# Patient Record
Sex: FEMALE | Race: BLACK OR AFRICAN AMERICAN | Employment: OTHER | URBAN - METROPOLITAN AREA
[De-identification: names, ages, dates, MRNs, and addresses within clinical notes are randomized per-mention and may not be internally consistent; named-entity substitution may affect disease eponyms.]

---

## 2017-03-23 ENCOUNTER — OFFICE VISIT (OUTPATIENT)
Dept: CARDIOLOGY CLINIC | Age: 82
End: 2017-03-23

## 2017-03-23 ENCOUNTER — CLINICAL SUPPORT (OUTPATIENT)
Dept: CARDIOLOGY CLINIC | Age: 82
End: 2017-03-23

## 2017-03-23 VITALS
SYSTOLIC BLOOD PRESSURE: 144 MMHG | HEART RATE: 74 BPM | DIASTOLIC BLOOD PRESSURE: 90 MMHG | OXYGEN SATURATION: 92 % | WEIGHT: 168.5 LBS | BODY MASS INDEX: 37.9 KG/M2 | HEIGHT: 56 IN | RESPIRATION RATE: 18 BRPM

## 2017-03-23 DIAGNOSIS — Z95.0 PACEMAKER: Primary | ICD-10-CM

## 2017-03-23 DIAGNOSIS — I25.10 CORONARY ARTERY DISEASE INVOLVING NATIVE CORONARY ARTERY OF NATIVE HEART WITHOUT ANGINA PECTORIS: ICD-10-CM

## 2017-03-23 DIAGNOSIS — I44.1 MOBITZ TYPE II ATRIOVENTRICULAR BLOCK: ICD-10-CM

## 2017-03-23 DIAGNOSIS — I49.5 SICK SINUS SYNDROME (HCC): ICD-10-CM

## 2017-03-23 DIAGNOSIS — I10 ESSENTIAL HYPERTENSION: ICD-10-CM

## 2017-03-23 DIAGNOSIS — I50.32 CHRONIC DIASTOLIC HEART FAILURE (HCC): Primary | ICD-10-CM

## 2017-03-23 DIAGNOSIS — R55 SYNCOPE AND COLLAPSE: ICD-10-CM

## 2017-03-23 DIAGNOSIS — Z95.0 PACEMAKER: ICD-10-CM

## 2017-03-23 RX ORDER — DOXAZOSIN 2 MG/1
2 TABLET ORAL DAILY
Refills: 1 | COMMUNITY
Start: 2017-03-13

## 2017-03-23 NOTE — MR AVS SNAPSHOT
Visit Information Date & Time Provider Department Dept. Phone Encounter #  
 3/23/2017 10:00 AM Fracisco Lockwood, [address] [facility] [phone] Upcoming Health Maintenance Date Due DTaP/Tdap/Td series (1 - Tdap) 5/17/1948 ZOSTER VACCINE AGE 60> 5/17/1987 GLAUCOMA SCREENING Q2Y 5/17/1992 OSTEOPOROSIS SCREENING (DEXA) 5/17/1992 MEDICARE YEARLY EXAM 5/17/1992 Pneumococcal 65+ High/Highest Risk (2 of 2 - PCV13) 4/7/2014 INFLUENZA AGE 9 TO ADULT 8/1/2016 Allergies as of 3/23/2017  Review Complete On: 3/23/2017 By: Fracisco Lockwood MD  
  
 Severity Noted Reaction Type Reactions Pcn [Penicillins]  04/06/2013    Unknown (comments) Current Immunizations  Reviewed on 3/6/2015 Name Date Influenza Vaccine 10/1/2014 Pneumococcal Polysaccharide (PPSV-23) 4/7/2013  6:38 PM  
  
 Not reviewed this visit You Were Diagnosed With   
  
 Codes Comments Chronic diastolic heart failure (HCC)    -  Primary ICD-10-CM: I50.32 
ICD-9-CM: 428.32 Coronary artery disease involving native coronary artery of native heart without angina pectoris     ICD-10-CM: I25.10 ICD-9-CM: 414.01 Sick sinus syndrome (HCC)     ICD-10-CM: I49.5 ICD-9-CM: 427.81 Mobitz type II atrioventricular block     ICD-10-CM: I44.1 ICD-9-CM: 426.12 Essential hypertension     ICD-10-CM: I10 
ICD-9-CM: 401.9 Pacemaker     ICD-10-CM: Z95.0 ICD-9-CM: V45.01 Vitals BP Pulse Resp Height(growth percentile) Weight(growth percentile) SpO2  
 144/90 (BP 1 Location: Right arm, BP Patient Position: Sitting) 74 18 4' 8\" (1.422 m) 168 lb 8 oz (76.4 kg) 92% BMI OB Status Smoking Status 37.78 kg/m2 Menopause Never Smoker Vitals History BMI and BSA Data Body Mass Index Body Surface Area 37.78 kg/m 2 1.74 m 2 Preferred Pharmacy Pharmacy Name Phone [address] [facility] Your Updated Medication List  
  
   
This list is accurate as of: 3/23/17 11:29 AM.  Always use your most recent med list.  
  
  
  
  
 allopurinol 100 mg tablet Commonly known as:  Alyssa Andrew Take  by mouth daily. ARTIFICIAL TEARS([id number]) ophthalmic solution Generic drug:  dextran 70-hypromellose Administer 1 Drop to both eyes as needed. aspirin delayed-release 81 mg tablet Take 81 mg by mouth daily. bumetanide 1 mg tablet Commonly known as:  Assunta Brink Take 1 Tab by mouth daily. doxazosin 2 mg tablet Commonly known as:  CARDURA Take 2 mg by mouth daily. ferrous sulfate 325 mg (65 mg iron) tablet Take 1 Tab by mouth two (2) times daily (with meals). FLONASE 50 mcg/actuation nasal spray Generic drug:  fluticasone 2 Sprays by Both Nostrils route daily. gabapentin 100 mg capsule Commonly known as:  NEURONTIN Take 2 Caps by mouth three (3) times daily. isosorbide mononitrate ER 30 mg tablet Commonly known as:  IMDUR  
take 1 tablet by mouth once daily TO HELP PREVENT CHEST PAIN  
  
 latanoprost 0.005 % ophthalmic solution Commonly known as:  Dequan Gregory Administer 1 Drop to both eyes nightly. lidocaine 5 % topical cream  
Apply  to affected area two (2) times daily as needed for Itching.  
  
 magnesium oxide 400 mg tablet Commonly known as:  MAG-OX Take 1 Tab by mouth daily. metoprolol tartrate 25 mg tablet Commonly known as:  LOPRESSOR Take 0.5 Tabs by mouth two (2) times a day. nitroglycerin 0.4 mg SL tablet Commonly known as:  NITROSTAT  
1 Tab by SubLINGual route every five (5) minutes as needed (call 911 if not relieved by 3). pantoprazole 40 mg tablet Commonly known as:  PROTONIX Take 1 Tab by mouth Daily (before breakfast). SANNA MILK OF MAGNESIA 400 mg/5 mL suspension Generic drug:  magnesium hydroxide Take 30 mL by mouth daily as needed for Constipation. pravastatin 20 mg tablet Commonly known as:  PRAVACHOL Take 1 Tab by mouth nightly. PROAIR HFA 90 mcg/actuation inhaler Generic drug:  albuterol Take 1 puff by inhalation every six (6) hours as needed for Wheezing. ticagrelor 60 mg Tab tablet Commonly known as:  [employer] Take 1 Tab by mouth two (2) times a day. traMADol 50 mg tablet Commonly known as:  ULTRAM  
Take 1 Tab by mouth every six (6) hours as needed for Pain. Max Daily Amount: 200 mg.  
  
 TYLENOL 325 mg tablet Generic drug:  acetaminophen Take 1,300 mg by mouth nightly. Prescriptions Sent to Pharmacy Refills  
 ticagrelor (BRILINTA) 60 mg tab tablet 11 Sig: Take 1 Tab by mouth two (2) times a day. Class: Normal  
 Pharmacy:  [facility]  #: [phone] Route: Oral  
  
We Performed the Following AMB POC EKG ROUTINE W/ 12 LEADS, INTER & REP [91371 CPT(R)] CK [id number] CPT(R)] LIPID PANEL [54790 CPT(R)] METABOLIC PANEL, COMPREHENSIVE [27562 CPT(R)] Please provide this summary of care documentation to your next provider. Your primary care clinician is listed as Walter Casey. If you have any questions after today's visit, please call [phone].

## 2017-03-23 NOTE — PROGRESS NOTES
6 month follow up. C/O SOB, SWELLING IN BLE, HANDS AND ARMS. PT ALSO REPORTED A MORPHINE CREAM FOR FEET.

## 2017-03-23 NOTE — PROGRESS NOTES
Subjective/HPI:     Damion Marshall is a 80 y.o. female is here for f/u appt. She reports that she has her baseline swelling in her legs and arms. She has arthritis and is using a cream for the pain. She has had worsening sob with exertion over the past year. Her daughter has noticed a progressive decline in her breathing as well. She isnt active at all now either. She sits in her chair all day. The patient denies chest pain, orthopnea, PND, palpitations, syncope, presyncope or fatigue.        Patient Active Problem List   Diagnosis Code    Other nonspecific abnormal serum enzyme levels R74.8    Sick sinus syndrome ([facility]) I49.5    Dehydration E86.0    ACS (acute coronary syndrome) ([facility]) I24.9    Hypertension I10    NSTEMI (non-ST elevated myocardial infarction) ([facility]) I21.4    Chest pain R07.9    CAD (coronary artery disease), native coronary artery I25.10    Syncope and collapse R55    Mobitz type II atrioventricular block I44.1    Pacemaker Z95.0    S/P PTCA (percutaneous transluminal coronary angioplasty) Z98.61    Coronary artery disease involving native coronary artery without angina pectoris I25.10    Acute blood loss anemia D62    GI bleed K92.2    Elevated troponin R79.89    CKD (chronic kidney disease) stage 3, GFR 30-59 ml/min N18.3    Chronic diastolic heart failure ([facility]) I50.32     PCP Provider  Zahida Patel NP  Past Medical History:   Diagnosis Date    Asthma     CAD (coronary artery disease)     Diabetes [facility] [address]     Hypertension     Irregular heart rate 4/25/13    Morbid obesity ([facility] [address]     Other ill-defined conditions(799.89)     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 Current Outpatient Prescriptions   Medication Sig    doxazosin (CARDURA) 2 mg tablet Take 2 mg by mouth daily.  ticagrelor (BRILINTA) 60 mg tab tablet Take 1 Tab by mouth two (2) times a day.  isosorbide mononitrate ER (IMDUR) 30 mg tablet take 1 tablet by mouth once daily TO HELP PREVENT CHEST PAIN    magnesium hydroxide (ISIDRO MILK OF MAGNESIA) 400 mg/5 mL suspension Take 30 mL by mouth daily as needed for Constipation.  gabapentin (NEURONTIN) 100 mg capsule Take 2 Caps by mouth three (3) times daily.  metoprolol tartrate (LOPRESSOR) 25 mg tablet Take 0.5 Tabs by mouth two (2) times a day.  bumetanide (BUMEX) 1 mg tablet Take 1 Tab by mouth daily.  magnesium oxide (MAG-OX) 400 mg tablet Take 1 Tab by mouth daily.  pantoprazole (PROTONIX) 40 mg tablet Take 1 Tab by mouth Daily (before breakfast).  traMADol (ULTRAM) 50 mg tablet Take 1 Tab by mouth every six (6) hours as needed for Pain. Max Daily Amount: 200 mg.  ferrous sulfate 325 mg (65 mg iron) tablet Take 1 Tab by mouth two (2) times daily (with meals).  acetaminophen (TYLENOL) 325 mg tablet Take 1,300 mg by mouth nightly.  dextran 70-hypromellose (ARTIFICIAL TEARS) ophthalmic solution Administer 1 Drop to both eyes as needed.  lidocaine 5 % topical cream Apply  to affected area two (2) times daily as needed for Itching.  fluticasone (FLONASE) 50 mcg/actuation nasal spray 2 Sprays by Both Nostrils route daily.  nitroglycerin (NITROSTAT) 0.4 mg SL tablet 1 Tab by SubLINGual route every five (5) minutes as needed (call 911 if not relieved by 3).  allopurinol (ZYLOPRIM) 100 mg tablet Take  by mouth daily.  aspirin delayed-release 81 mg tablet Take 81 mg by mouth daily.     albuterol (PROAIR HFA) 90 mcg/actuation inhaler Take 1 puff by inhalation every six (6) hours as needed for Wheezing.  pravastatin (PRAVACHOL) 20 mg tablet Take 1 Tab by mouth nightly.  latanoprost (XALATAN) 0.005 % ophthalmic solution Administer 1 Drop to both eyes nightly. No current facility-administered medications for this visit. Vitals:    03/23/17 1027 03/23/17 1042   BP: 138/88 144/90   Pulse: 74    Resp: 18    SpO2: 92%    Weight: 168 lb 8 oz (76.4 kg)    Height: 4' 8\" (1.422 m)      Social History     Social History    Marital status:      Spouse name: N/A    Number of children: N/A    Years of education: N/A     Occupational History    Not on file. Social History Main Topics    Smoking status: Never Smoker    Smokeless tobacco: Never Used    Alcohol use No    Drug use: No    Sexual activity: Yes     Partners: Male     Other Topics Concern    Not on file     Social History Narrative       I have reviewed the nurses notes, vitals, problem list, allergy list, medical history, family, social history and medications. Review of Symptoms:    General: Pt denies excessive weight gain or loss. Pt is able to conduct ADL's  HEENT: Denies blurred vision, headaches, epistaxis and difficulty swallowing. Respiratory: Denies shortness of breath, +DOTSON, denies wheezing or stridor. Cardiovascular: Denies precordial pain, palpitations, edema or PND  Gastrointestinal: Denies poor appetite, indigestion, abdominal pain or blood in stool  Urinary: Denies dysuria, pyuria  Musculoskeletal: Denies pain or swelling from muscles or joints  Neurologic: Denies tremor, paresthesias, or sensory motor disturbance  Skin: Denies rash, itching or texture change. Psych: Denies depression        Physical Exam:      General: Well developed, in no acute distress, cooperative and alert  HEENT: No carotid bruits, no JVD, trach is midline. Neck Supple, PEERL, EOM intact. Heart:  Normal S1/S2 negative S3 or S4.  Regular, no murmur, gallop or rub.   Respiratory: Clear bilaterally x 4, no wheezing or rales  Abdomen:   Soft, non-tender, no masses, bowel sounds are active.   Extremities:  1+jacqueline pedal edema, right leg with brace mid calf to foot, normal cap refill, no cyanosis, atraumatic. Neuro: A&Ox3, speech clear,walker. Skin: Skin color is normal. No rashes or lesions. Non diaphoretic  Vascular: 2+ pulses symmetric in all extremities    Cardiographics    ECG: Sinus  Rhythm  -First degree A-V block HR 63  Inferior infarct -age undetermined. Negative T-waves     Results for orders placed or performed during the hospital encounter of 07/22/16   EKG, 12 LEAD, INITIAL   Result Value Ref Range    Ventricular Rate 60 BPM    Atrial Rate 59 BPM    P-R Interval 256 ms    QRS Duration 158 ms    Q-T Interval 466 ms    QTC Calculation (Bezet) 466 ms    Calculated P Axis 14 degrees    Calculated R Axis -55 degrees    Calculated T Axis 96 degrees    Diagnosis       AV dual-paced rhythm with prolonged AV conduction    When compared with ECG of 29-FEB-2016 16:31,  Electronic ventricular pacemaker has replaced Sinus rhythm  Confirmed by Galindo Priest ([id number]) on 7/22/2016 3:35:36 PM           Cardiology Labs:  Lab Results   Component Value Date/Time    Cholesterol, total 128 03/28/2017 10:46 AM    HDL Cholesterol 43 03/28/2017 10:46 AM    LDL, calculated 70 03/28/2017 10:46 AM    Triglyceride 76 03/28/2017 10:46 AM    CHOL/HDL Ratio 2.5 01/05/2015 02:25 AM       Lab Results   Component Value Date/Time    Sodium 141 03/28/2017 10:46 AM    Potassium 4.1 03/28/2017 10:46 AM    Chloride 103 03/28/2017 10:46 AM    CO2 25 03/28/2017 10:46 AM    Anion gap 8 08/18/2016 03:38 AM    Glucose 120 03/28/2017 10:46 AM    BUN 21 03/28/2017 10:46 AM    Creatinine 0.95 03/28/2017 10:46 AM    BUN/Creatinine ratio 22 03/28/2017 10:46 AM    GFR est AA 61 03/28/2017 10:46 AM    GFR est non-AA 53 03/28/2017 10:46 AM    Calcium 10.7 03/28/2017 10:46 AM    AST (SGOT) 20 03/28/2017 10:46 AM    Alk.  phosphatase 98 03/28/2017 10:46 AM    Protein, total 6.6 03/28/2017 10:46 AM    Albumin 4.1 03/28/2017 10:46 AM    Globulin 2.7 08/18/2016 03:38 AM    A-G Ratio 1.6 03/28/2017 10:46 AM    ALT (SGPT) 16 03/28/2017 10:46 AM           Assessment:     Assessment:     Sohail Ireland was seen today for coronary artery disease. Diagnoses and all orders for this visit:    Chronic diastolic heart failure (HCC)  -     AMB POC EKG ROUTINE W/ 12 LEADS, INTER & REP  -     CK  -     LIPID PANEL  -     METABOLIC PANEL, COMPREHENSIVE    Coronary artery disease involving native coronary artery of native heart without angina pectoris  -     ticagrelor (BRILINTA) 60 mg tab tablet; Take 1 Tab by mouth two (2) times a day. -     CK  -     LIPID PANEL  -     METABOLIC PANEL, COMPREHENSIVE    Sick sinus syndrome (HCC)  -     CK  -     LIPID PANEL  -     METABOLIC PANEL, COMPREHENSIVE    Mobitz type II atrioventricular block  -     CK  -     LIPID PANEL  -     METABOLIC PANEL, COMPREHENSIVE    Essential hypertension  -     CK  -     LIPID PANEL  -     METABOLIC PANEL, COMPREHENSIVE    Pacemaker  -     CK  -     LIPID PANEL  -     METABOLIC PANEL, COMPREHENSIVE    Other orders  -     CVD REPORT  -     CKD REPORT        ICD-10-CM ICD-9-CM    1. Chronic diastolic heart failure (HCC) I50.32 428.32 AMB POC EKG ROUTINE W/ 12 LEADS, INTER & REP      CK      LIPID PANEL      METABOLIC PANEL, COMPREHENSIVE   2. Coronary artery disease involving native coronary artery of native heart without angina pectoris I25.10 414.01 ticagrelor (BRILINTA) 60 mg tab tablet      CK      LIPID PANEL      METABOLIC PANEL, COMPREHENSIVE   3. Sick sinus syndrome (HCC) I49.5 427.81 CK      LIPID PANEL      METABOLIC PANEL, COMPREHENSIVE   4. Mobitz type II atrioventricular block I44.1 426.12 CK      LIPID PANEL      METABOLIC PANEL, COMPREHENSIVE   5. Essential hypertension I10 401.9 CK      LIPID PANEL      METABOLIC PANEL, COMPREHENSIVE   6.  Pacemaker Z95.0 V45.01 CK      LIPID PANEL      METABOLIC PANEL, COMPREHENSIVE Orders Placed This Encounter    CK    LIPID PANEL    METABOLIC PANEL, COMPREHENSIVE    CVD REPORT    CKD REPORT    AMB POC EKG ROUTINE W/ 12 LEADS, INTER & REP     Order Specific Question:   Reason for Exam:     Answer:   routine    doxazosin (CARDURA) 2 mg tablet     Sig: Take 2 mg by mouth daily. Refill:  1    ticagrelor (BRILINTA) 60 mg tab tablet     Sig: Take 1 Tab by mouth two (2) times a day. Dispense:  60 Tab     Refill:  11        Plan:     Patient presents today for f/u and is reporting DOTSON over the past year that is worsening. She denies other cardiac complaints. She is also inactive  . 1. CAD: Pt with worsening DOTSON, additionally less active, improved with inhaler. At this time encouraged more activity without stent 3/16 and NL echo in 2015. She is one year post last stent reduce Brilinta to 60mg bid with lifelong DAPT with h/o bifurcation stenting and NSTEMI thereafter with no new coronary lesions- ? Thrombus resolved. 2. HTN: Well controlled  3. High cholesterol:  On statin labs due and ordered      F/u in 6 months.       Fracisco Lockwood MD

## 2017-03-28 ENCOUNTER — HOSPITAL ENCOUNTER (OUTPATIENT)
Dept: LAB | Age: 82
Discharge: HOME | End: 2017-03-28
Payer: MEDICARE

## 2017-03-28 PROCEDURE — 80061 LIPID PANEL: CPT

## 2017-03-28 PROCEDURE — 82550 ASSAY OF CK (CPK): CPT

## 2017-03-28 PROCEDURE — 80053 COMPREHEN METABOLIC PANEL: CPT

## 2017-03-28 PROCEDURE — 36415 COLL VENOUS BLD VENIPUNCTURE: CPT

## 2017-03-30 LAB
ALBUMIN SERPL-MCNC: 4.1 G/DL (ref 3.5–4.7)
ALBUMIN/GLOB SERPL: 1.6 {RATIO} (ref 1.2–2.2)
ALP SERPL-CCNC: 98 IU/L (ref 39–117)
ALT SERPL-CCNC: 16 IU/L (ref 0–32)
AST SERPL-CCNC: 20 IU/L (ref 0–40)
BILIRUB SERPL-MCNC: 0.5 MG/DL (ref 0–1.2)
BUN SERPL-MCNC: 21 MG/DL (ref 8–27)
BUN/CREAT SERPL: 22 (ref 11–26)
CALCIUM SERPL-MCNC: 10.7 MG/DL (ref 8.7–10.3)
CHLORIDE SERPL-SCNC: 103 MMOL/L (ref 96–106)
CHOLEST SERPL-MCNC: 128 MG/DL (ref 100–199)
CK SERPL-CCNC: 51 U/L (ref 24–173)
CO2 SERPL-SCNC: 25 MMOL/L (ref 18–29)
CREAT SERPL-MCNC: 0.95 MG/DL (ref 0.57–1)
GLOBULIN SER CALC-MCNC: 2.5 G/DL (ref 1.5–4.5)
GLUCOSE SERPL-MCNC: 120 MG/DL (ref 65–99)
HDLC SERPL-MCNC: 43 MG/DL
INTERPRETATION, 910389: NORMAL
INTERPRETATION: NORMAL
LDLC SERPL CALC-MCNC: 70 MG/DL (ref 0–99)
PDF IMAGE, 910387: NORMAL
POTASSIUM SERPL-SCNC: 4.1 MMOL/L (ref 3.5–5.2)
PROT SERPL-MCNC: 6.6 G/DL (ref 6–8.5)
SODIUM SERPL-SCNC: 141 MMOL/L (ref 134–144)
TRIGL SERPL-MCNC: 76 MG/DL (ref 0–149)
VLDLC SERPL CALC-MCNC: 15 MG/DL (ref 5–40)

## 2017-04-05 NOTE — PROGRESS NOTES
Spoke with patient's daughter Dottie(verified HIP[employer])  Verified patient with 2 patient identifier  Informed per Dr Tanya wynn.

## 2017-07-03 DIAGNOSIS — I25.10 CORONARY ARTERY DISEASE INVOLVING NATIVE CORONARY ARTERY OF NATIVE HEART WITHOUT ANGINA PECTORIS: ICD-10-CM

## 2017-10-05 ENCOUNTER — OFFICE VISIT (OUTPATIENT)
Dept: CARDIOLOGY CLINIC | Age: 82
End: 2017-10-05

## 2017-10-05 VITALS
OXYGEN SATURATION: 96 % | RESPIRATION RATE: 20 BRPM | DIASTOLIC BLOOD PRESSURE: 70 MMHG | HEIGHT: 56 IN | HEART RATE: 60 BPM | BODY MASS INDEX: 37.97 KG/M2 | SYSTOLIC BLOOD PRESSURE: 128 MMHG | WEIGHT: 168.8 LBS

## 2017-10-05 DIAGNOSIS — Z95.0 PACEMAKER: ICD-10-CM

## 2017-10-05 DIAGNOSIS — I49.5 SICK SINUS SYNDROME (HCC): ICD-10-CM

## 2017-10-05 DIAGNOSIS — N18.30 CKD (CHRONIC KIDNEY DISEASE) STAGE 3, GFR 30-59 ML/MIN (HCC): ICD-10-CM

## 2017-10-05 DIAGNOSIS — I50.32 CHRONIC DIASTOLIC HEART FAILURE (HCC): Primary | ICD-10-CM

## 2017-10-05 DIAGNOSIS — I25.10 CORONARY ARTERY DISEASE INVOLVING NATIVE CORONARY ARTERY OF NATIVE HEART WITHOUT ANGINA PECTORIS: ICD-10-CM

## 2017-10-05 RX ORDER — NITROGLYCERIN 0.4 MG/1
0.4 TABLET SUBLINGUAL
Qty: 25 TAB | Refills: 1 | Status: SHIPPED | OUTPATIENT
Start: 2017-10-05 | End: 2019-02-02 | Stop reason: SDUPTHER

## 2017-10-05 NOTE — PROGRESS NOTES
Gio Tabares DNP, ANP-BC  Subjective/HPI:     Eloy Cooks is a 80 y.o. female is here for routine f/u. The patient denies chest pain/exertional shortness of breath, orthopnea, PND, persistent LE edema, palpitations, syncope, presyncope or fatigue. Ms. Elva Perez reports due to improper fitting shoes which she is waiting for new fitting for custom made she has occasional discomfort in her feet and her toes, denies blistering open wounds or sores. She has mild dyspnea on exertion however it does not limit her for her activities at the age of 80. Using rolling walker and cane. Patient denies any unusual bruising or bleeding secondary to extended use of dual antiplatelet therapy. Patient Active Problem List   Diagnosis Code    Other nonspecific abnormal serum enzyme levels R74.8    Sick sinus syndrome ([facility]) I49.5    Dehydration E86.0    ACS (acute coronary syndrome) ([facility]) I24.9    Hypertension I10    NSTEMI (non-ST elevated myocardial infarction) ([facility]) I21.4    Chest pain R07.9    CAD (coronary artery disease), native coronary artery I25.10    Syncope and collapse R55    Mobitz type II atrioventricular block I44.1    Pacemaker Z95.0    S/P PTCA (percutaneous transluminal coronary angioplasty) Z98.61    Coronary artery disease involving native coronary artery without angina pectoris I25.10    Acute blood loss anemia D62    GI bleed K92.2    Elevated troponin R74.8    CKD (chronic kidney disease) stage 3, GFR 30-59 ml/min N18.3    Chronic diastolic heart failure ([facility]) I50.32         CARDIAC STRUCTURES:  --  EF calculated by contrast ventriculography was 60 %. --  CORONARY CIRCULATION:  --  Coronary angiography demonstrated minor luminal irregularities. --  Mid LAD: There was a 0 % stenosis at the site of a prior stent. --  Distal LAD: The vessel was small sized. There was a discrete 50 %  stenosis, unchanged from prior cardiac catheterization.   --  2nd diagonal: There was a 0 % stenosis at the site of a prior stent. --  Proximal RCA: There was a 0 % stenosis at the site of a prior stent. PCP Provider  Juana Sullivan NP  Past Medical History:   Diagnosis Date    Asthma     CAD (coronary artery disease)     [facility] [address]     Hypertension     Irregular heart rate 4/25/13    Morbid obesity 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 No pertinent family history. Current Outpatient Prescriptions   Medication Sig    Cetirizine (ZYRTEC) 10 mg cap Take  by mouth daily.  nitroglycerin (NITROSTAT) 0.4 mg SL tablet 1 Tab by SubLINGual route every five (5) minutes as needed (call 911 if not relieved by 3).  ticagrelor (BRILINTA) 60 mg tab tablet Take 1 Tab by mouth two (2) times a day.  isosorbide mononitrate ER (IMDUR) 30 mg tablet take 1 tablet by mouth once daily TO HELP PREVENT CHEST PAIN    doxazosin (CARDURA) 2 mg tablet Take 2 mg by mouth daily.  magnesium hydroxide (ISIDRO MILK OF MAGNESIA) 400 mg/5 mL suspension Take 30 mL by mouth daily as needed for Constipation.  gabapentin (NEURONTIN) 100 mg capsule Take 2 Caps by mouth three (3) times daily.  metoprolol tartrate (LOPRESSOR) 25 mg tablet Take 0.5 Tabs by mouth two (2) times a day.  bumetanide (BUMEX) 1 mg tablet Take 1 Tab by mouth daily.  magnesium oxide (MAG-OX) 400 mg tablet Take 1 Tab by mouth daily.  traMADol (ULTRAM) 50 mg tablet Take 1 Tab by mouth every six (6) hours as needed for Pain. Max Daily Amount: 200 mg.     ferrous sulfate 325 mg (65 mg iron) tablet Take 1 Tab by mouth two (2) times daily (with meals).  acetaminophen (TYLENOL) 325 mg tablet Take 1,300 mg by mouth nightly.  dextran 70-hypromellose (ARTIFICIAL TEARS) ophthalmic solution Administer 1 Drop to both eyes as needed.  fluticasone (FLONASE) 50 mcg/actuation nasal spray 2 Sprays by Both Nostrils route daily.  allopurinol (ZYLOPRIM) 100 mg tablet Take  by mouth daily.  albuterol (PROAIR HFA) 90 mcg/actuation inhaler Take 1 puff by inhalation every six (6) hours as needed for Wheezing.  pravastatin (PRAVACHOL) 20 mg tablet Take 1 Tab by mouth nightly.  latanoprost (XALATAN) 0.005 % ophthalmic solution Administer 1 Drop to both eyes nightly.  pantoprazole (PROTONIX) 40 mg tablet Take 1 Tab by mouth Daily (before breakfast).  lidocaine 5 % topical cream Apply  to affected area two (2) times daily as needed for Itching.  aspirin delayed-release 81 mg tablet Take 81 mg by mouth daily. No current facility-administered medications for this visit. Vitals:    10/05/17 1037 10/05/17 1049   BP: 120/70 128/70   Pulse: 60    Resp: 20    SpO2: 96%    Weight: 168 lb 12.8 oz (76.6 kg)    Height: 4' 8\" (1.422 m)      Social History     Social History    Marital status:      Spouse name: N/A    Number of children: N/A    Years of education: N/A     Occupational History    Not on file. Social History Main Topics    Smoking status: Never Smoker    Smokeless tobacco: Never Used    Alcohol use No    Drug use: No    Sexual activity: Yes     Partners: Male     Other Topics Concern    Not on file     Social History Narrative       I have reviewed the nurses notes, vitals, problem list, allergy list, medical history, family, social history and medications. Review of Symptoms:    General: Pt denies excessive weight gain or loss.  Pt is able to conduct ADL's  HEENT: Denies blurred vision, headaches, epistaxis and difficulty swallowing. Respiratory: Denies shortness of breath, DOTSON, wheezing or stridor. Cardiovascular: Denies precordial pain, palpitations, + edema denies + occasional PND  Gastrointestinal: Denies poor appetite, indigestion, abdominal pain or blood in stool  Musculoskeletal: Denies pain or swelling from muscles or joints  Neurologic: Denies tremor, paresthesias, or sensory motor disturbance  Skin: Denies rash, itching or texture change. Physical Exam:      General: Well developed, in no acute distress, cooperative and alert  HEENT: No carotid bruits, no JVD, trach is midline. Neck Supple  Heart:  Normal S1/S2 negative S3 or S4. Regular, no murmur, gallop or rub.   Respiratory: Clear bilaterally x 4, no wheezing or rales  Abdomen:   Soft, non-tender, no masses, bowel sounds are active.   Extremities: Nonpitting dependent edema, right lower extremity in brace,. Neuro: A&Ox3, speech clear, gait assisted with walker. Skin: Skin color is normal. No rashes or lesions.  Non diaphoretic  Vascular: 2+ pulses symmetric in all extremities    Cardiographics    ECG: Paced  Results for orders placed or performed during the hospital encounter of 07/22/16   EKG, 12 LEAD, INITIAL   Result Value Ref Range    Ventricular Rate 60 BPM    Atrial Rate 59 BPM    P-R Interval 256 ms    QRS Duration 158 ms    Q-T Interval 466 ms    QTC Calculation (Bezet) 466 ms    Calculated P Axis 14 degrees    Calculated R Axis -55 degrees    Calculated T Axis 96 degrees    Diagnosis       AV dual-paced rhythm with prolonged AV conduction    When compared with ECG of 29-FEB-2016 16:31,  Electronic ventricular pacemaker has replaced Sinus rhythm  Confirmed by Arcadio Srivastava ([id number]) on 7/22/2016 3:35:36 PM           Cardiology Labs:  Lab Results   Component Value Date/Time    Cholesterol, total 128 03/28/2017 10:46 AM    HDL Cholesterol 43 03/28/2017 10:46 AM    LDL, calculated 70 03/28/2017 10:46 AM    Triglyceride 76 03/28/2017 10:46 AM    CHOL/HDL Ratio 2.5 01/05/2015 02:25 AM       Lab Results   Component Value Date/Time    Sodium 141 03/28/2017 10:46 AM    Potassium 4.1 03/28/2017 10:46 AM    Chloride 103 03/28/2017 10:46 AM    CO2 25 03/28/2017 10:46 AM    Anion gap 8 08/18/2016 03:38 AM    Glucose 120 03/28/2017 10:46 AM    BUN 21 03/28/2017 10:46 AM    Creatinine 0.95 03/28/2017 10:46 AM    BUN/Creatinine ratio 22 03/28/2017 10:46 AM    GFR est AA 61 03/28/2017 10:46 AM    GFR est non-AA 53 03/28/2017 10:46 AM    Calcium 10.7 03/28/2017 10:46 AM    Bilirubin, total 0.5 03/28/2017 10:46 AM    AST (SGOT) 20 03/28/2017 10:46 AM    Alk. phosphatase 98 03/28/2017 10:46 AM    Protein, total 6.6 03/28/2017 10:46 AM    Albumin 4.1 03/28/2017 10:46 AM    Globulin 2.7 08/18/2016 03:38 AM    A-G Ratio 1.6 03/28/2017 10:46 AM    ALT (SGPT) 16 03/28/2017 10:46 AM           Assessment:     Assessment:     Diagnoses and all orders for this visit:    1. Chronic diastolic heart failure ([address]    2. Sick sinus syndrome (HCC)  -     AMB POC EKG ROUTINE W/ 12 LEADS, INTER & REP    3. Coronary artery disease involving native coronary artery of native heart without angina pectoris    4. CKD (chronic kidney disease) stage 3, GFR 30-59 ml/min    5. Pacemaker    Other orders  -     nitroglycerin (NITROSTAT) 0.4 mg SL tablet; 1 Tab by SubLINGual route every five (5) minutes as needed (call 911 if not relieved by 3). ICD-10-CM ICD-9-CM    1. Chronic diastolic heart failure (HCC) I50.32 428.32    2. Sick sinus syndrome (HCC) I49.5 427.81 AMB POC EKG ROUTINE W/ 12 LEADS, INTER & REP   3. Coronary artery disease involving native coronary artery of native heart without angina pectoris I25.10 414.01    4. CKD (chronic kidney disease) stage 3, GFR 30-59 ml/min N18.3 585.3    5.  Pacemaker Z95.0 V45.01      Orders Placed This Encounter    AMB POC EKG ROUTINE W/ 12 LEADS, INTER & REP     Order Specific Question:   Reason for Exam:     Answer:   ROUTINE    Cetirizine (ZYRTEC) 10 mg cap Sig: Take  by mouth daily.  nitroglycerin (NITROSTAT) 0.4 mg SL tablet     Si Tab by SubLINGual route every five (5) minutes as needed (call 911 if not relieved by 3). Dispense:  25 Tab     Refill:  1        Plan:     1. Atherosclerotic heart disease: Clinically stable asymptomatic: To continue dual antiplatelet therapy nonobstructive stents via angiography 2016. Although no recent use, refilled nitroglycerin today at her request.  Maintain Brilinta 60mg bid lifelong with h/o bifurcation stenting and NSTEMI thereafter with no new coronary lesions- ? Thrombus resolved. GI evaluation  for low grade GIB see DC summary and OK to restart Brilinta per Dr. Zhane Coello. 2.  Hypertension controlled continue current therapy  3. Diastolic heart failure with chronic kidney disease: Presenting clinically compensated  4. Sick sinus syndrome: 100% paced followed by electrophysiology  5. Hyperlipidemia: On statin therapy LDL     Continue current therapy follow-up in 6 months.     Chase Corea MD

## 2017-10-05 NOTE — MR AVS SNAPSHOT
Visit Information Date & Time Provider Department Dept. Phone Encounter #  
 10/5/2017 10:45 AM Ramos Mcdermott, [address] [place] [address] [facility] [phone] [id number] Upcoming Health Maintenance Date Due DTaP/Tdap/Td series (1 - Tdap) 5/17/1948 ZOSTER VACCINE AGE 60> 3/17/1987 GLAUCOMA SCREENING Q2Y 5/17/1992 OSTEOPOROSIS SCREENING (DEXA) 5/17/1992 MEDICARE YEARLY EXAM 5/17/1992 Pneumococcal 65+ Low/Medium Risk (2 of 2 - PCV13) 4/7/2014 INFLUENZA AGE 9 TO ADULT 8/1/2017 Allergies as of 10/5/2017  Review Complete On: 10/5/2017 By: Ramos Mcdermott MD  
  
 Severity Noted Reaction Type Reactions Pcn [Penicillins]  04/06/2013    Unknown (comments) Current Immunizations  Reviewed on 3/6/2015 Name Date Influenza Vaccine 10/1/2014 Pneumococcal Polysaccharide (PPSV-23) 4/7/2013  6:38 PM  
  
 Not reviewed this visit You Were Diagnosed With   
  
 Codes Comments Chronic diastolic heart failure (HCC)    -  Primary ICD-10-CM: I50.32 
ICD-9-CM: 428.32 Sick sinus syndrome (HCC)     ICD-10-CM: I49.5 ICD-9-CM: 427.81 Coronary artery disease involving native coronary artery of native heart without angina pectoris     ICD-10-CM: I25.10 ICD-9-CM: 414.01   
 CKD (chronic kidney disease) stage 3, GFR 30-59 ml/min     ICD-10-CM: N18.3 ICD-9-CM: 553. 3 Pacemaker     ICD-10-CM: Z95.0 ICD-9-CM: V45.01 Vitals BP Pulse Resp Height(growth percentile) Weight(growth percentile) SpO2  
 128/70 (BP 1 Location: Right arm, BP Patient Position: Sitting) 60 20 4' 8\" (1.422 m) 168 lb 12.8 oz (76.6 kg) 96% BMI OB Status Smoking Status 37.84 kg/m2 Menopause Never Smoker Vitals History BMI and BSA Data Body Mass Index Body Surface Area  
 37.84 kg/m 2 1.74 m 2 Preferred Pharmacy Pharmacy Name Phone [facility] Your Updated Medication List  
  
   
This list is accurate as of: 10/5/17 11:44 AM.  Always use your most recent med list.  
  
  
  
  
 allopurinol 100 mg tablet Commonly known as:  Vea Bibber Take  by mouth daily. ARTIFICIAL TEARS([id number]) ophthalmic solution Generic drug:  dextran 70-hypromellose Administer 1 Drop to both eyes as needed. aspirin delayed-release 81 mg tablet Take 81 mg by mouth daily. bumetanide 1 mg tablet Commonly known as:  Martinez Nhung Take 1 Tab by mouth daily. doxazosin 2 mg tablet Commonly known as:  CARDURA Take 2 mg by mouth daily. ferrous sulfate 325 mg (65 mg iron) tablet Take 1 Tab by mouth two (2) times daily (with meals). FLONASE 50 mcg/actuation nasal spray Generic drug:  fluticasone 2 Sprays by Both Nostrils route daily. gabapentin 100 mg capsule Commonly known as:  NEURONTIN Take 2 Caps by mouth three (3) times daily. isosorbide mononitrate ER 30 mg tablet Commonly known as:  IMDUR  
take 1 tablet by mouth once daily TO HELP PREVENT CHEST PAIN  
  
 latanoprost 0.005 % ophthalmic solution Commonly known as:  Asael Cower Administer 1 Drop to both eyes nightly. lidocaine 5 % topical cream  
Apply  to affected area two (2) times daily as needed for Itching.  
  
 magnesium oxide 400 mg tablet Commonly known as:  MAG-OX Take 1 Tab by mouth daily. metoprolol tartrate 25 mg tablet Commonly known as:  LOPRESSOR Take 0.5 Tabs by mouth two (2) times a day. nitroglycerin 0.4 mg SL tablet Commonly known as:  NITROSTAT  
1 Tab by SubLINGual route every five (5) minutes as needed (call 911 if not relieved by 3). pantoprazole 40 mg tablet Commonly known as:  PROTONIX Take 1 Tab by mouth Daily (before breakfast). ISIDRO MILK OF MAGNESIA 400 mg/5 mL suspension Generic drug:  magnesium hydroxide Take 30 mL by mouth daily as needed for Constipation. pravastatin 20 mg tablet Commonly known as:  PRAVACHOL Take 1 Tab by mouth nightly. PROAIR HFA 90 mcg/actuation inhaler Generic drug:  albuterol Take 1 puff by inhalation every six (6) hours as needed for Wheezing. ticagrelor 60 mg Tab tablet Commonly known as:  [employer] Take 1 Tab by mouth two (2) times a day. traMADol 50 mg tablet Commonly known as:  ULTRAM  
Take 1 Tab by mouth every six (6) hours as needed for Pain. Max Daily Amount: 200 mg.  
  
 TYLENOL 325 mg tablet Generic drug:  acetaminophen Take 1,300 mg by mouth nightly. ZyrTEC 10 mg Cap Generic drug:  Cetirizine Take  by mouth daily. Prescriptions Sent to Pharmacy Refills  
 nitroglycerin (NITROSTAT) 0.4 mg SL tablet 1 Si Tab by SubLINGual route every five (5) minutes as needed (call 911 if not relieved by 3). Class: Normal  
 [facility] Ph [facility]: [phone] Route: SubLINGual  
  
We Performed the Following AMB POC EKG ROUTINE  LEADS, INTER & REP [87016 CPT(R)] Introducing [facility]! Dear Munir Fam: Thank you for requesting a [employer] account. Our records indicate that you have previously registered for a [employer] account but its currently inactive. Please call our [employer] support line at [phone]. Additional Information If you have questions, please visit the Frequently Asked Questions section of the [email or web address] website at [email or web address] Remember, [employer] is NOT to be used for urgent needs. For medical emergencies, dial 911. Now available from your iPhone and Android! Please provide this summary of care documentation to your next provider. Your primary care clinician is listed as Jaiden Cali. If you have any questions after today's visit, please call [phone].

## 2017-12-12 RX ORDER — METOPROLOL TARTRATE 25 MG/1
TABLET, FILM COATED ORAL
Qty: 90 TAB | Refills: 3 | Status: SHIPPED | OUTPATIENT
Start: 2017-12-12 | End: 2018-05-31 | Stop reason: SDUPTHER

## 2018-03-07 ENCOUNTER — APPOINTMENT (OUTPATIENT)
Dept: CT IMAGING | Age: 83
End: 2018-03-07
Attending: EMERGENCY MEDICINE
Payer: MEDICARE

## 2018-03-07 ENCOUNTER — HOSPITAL ENCOUNTER (EMERGENCY)
Age: 83
Discharge: HOME | End: 2018-03-07
Attending: EMERGENCY MEDICINE
Payer: MEDICARE

## 2018-03-07 ENCOUNTER — APPOINTMENT (OUTPATIENT)
Dept: ULTRASOUND IMAGING | Age: 83
End: 2018-03-07
Attending: EMERGENCY MEDICINE
Payer: MEDICARE

## 2018-03-07 ENCOUNTER — APPOINTMENT (OUTPATIENT)
Dept: GENERAL RADIOLOGY | Age: 83
End: 2018-03-07
Attending: EMERGENCY MEDICINE
Payer: MEDICARE

## 2018-03-07 VITALS
HEART RATE: 72 BPM | WEIGHT: 168.87 LBS | TEMPERATURE: 97.4 F | BODY MASS INDEX: 35.45 KG/M2 | OXYGEN SATURATION: 99 % | SYSTOLIC BLOOD PRESSURE: 144 MMHG | HEIGHT: 58 IN | DIASTOLIC BLOOD PRESSURE: 80 MMHG | RESPIRATION RATE: 15 BRPM

## 2018-03-07 DIAGNOSIS — R10.84 ABDOMINAL PAIN, GENERALIZED: ICD-10-CM

## 2018-03-07 DIAGNOSIS — N39.0 URINARY TRACT INFECTION WITHOUT HEMATURIA, SITE UNSPECIFIED: Primary | ICD-10-CM

## 2018-03-07 LAB
ALBUMIN SERPL-MCNC: 3.3 G/DL (ref 3.5–5)
ALBUMIN/GLOB SERPL: 0.7 {RATIO} (ref 1.1–2.2)
ALP SERPL-CCNC: 219 U/L (ref 45–117)
ALT SERPL-CCNC: 229 U/L (ref 12–78)
ANION GAP SERPL CALC-SCNC: 5 MMOL/L (ref 5–15)
APPEARANCE UR: CLEAR
AST SERPL-CCNC: 417 U/L (ref 15–37)
BACTERIA URNS QL MICRO: ABNORMAL /HPF
BASOPHILS # BLD: 0 K/UL (ref 0–0.1)
BASOPHILS NFR BLD: 0 % (ref 0–1)
BILIRUB SERPL-MCNC: 0.5 MG/DL (ref 0.2–1)
BILIRUB UR QL: NEGATIVE
BUN SERPL-MCNC: 24 MG/DL (ref 6–20)
BUN/CREAT SERPL: 18 (ref 12–20)
CALCIUM SERPL-MCNC: 9.9 MG/DL (ref 8.5–10.1)
CHLORIDE SERPL-SCNC: 106 MMOL/L (ref 97–108)
CO2 SERPL-SCNC: 31 MMOL/L (ref 21–32)
COLOR UR: ABNORMAL
CREAT SERPL-MCNC: 1.3 MG/DL (ref 0.55–1.02)
DIFFERENTIAL METHOD BLD: ABNORMAL
EOSINOPHIL # BLD: 0.1 K/UL (ref 0–0.4)
EOSINOPHIL NFR BLD: 2 % (ref 0–7)
EPITH CASTS URNS QL MICRO: ABNORMAL /LPF
ERYTHROCYTE [DISTWIDTH] IN BLOOD BY AUTOMATED COUNT: 13.2 % (ref 11.5–14.5)
GLOBULIN SER CALC-MCNC: 4.6 G/DL (ref 2–4)
GLUCOSE SERPL-MCNC: 174 MG/DL (ref 65–100)
GLUCOSE UR STRIP.AUTO-MCNC: NEGATIVE MG/DL
HCT VFR BLD AUTO: 38.7 % (ref 35–47)
HGB BLD-MCNC: 12.7 G/DL (ref 11.5–16)
HGB UR QL STRIP: NEGATIVE
HYALINE CASTS URNS QL MICRO: ABNORMAL /LPF (ref 0–5)
IMM GRANULOCYTES # BLD: 0 K/UL (ref 0–0.04)
IMM GRANULOCYTES NFR BLD AUTO: 1 % (ref 0–0.5)
INR PPP: 1 (ref 0.9–1.1)
KETONES UR QL STRIP.AUTO: NEGATIVE MG/DL
LACTATE SERPL-SCNC: 1.3 MMOL/L (ref 0.4–2)
LEUKOCYTE ESTERASE UR QL STRIP.AUTO: ABNORMAL
LIPASE SERPL-CCNC: 174 U/L (ref 73–393)
LYMPHOCYTES # BLD: 1.3 K/UL (ref 0.8–3.5)
LYMPHOCYTES NFR BLD: 19 % (ref 12–49)
MCH RBC QN AUTO: 31.8 PG (ref 26–34)
MCHC RBC AUTO-ENTMCNC: 32.8 G/DL (ref 30–36.5)
MCV RBC AUTO: 97 FL (ref 80–99)
MONOCYTES # BLD: 0.4 K/UL (ref 0–1)
MONOCYTES NFR BLD: 5 % (ref 5–13)
NEUTS SEG # BLD: 5.1 K/UL (ref 1.8–8)
NEUTS SEG NFR BLD: 74 % (ref 32–75)
NITRITE UR QL STRIP.AUTO: NEGATIVE
NRBC # BLD: 0 K/UL (ref 0–0.01)
NRBC BLD-RTO: 0 PER 100 WBC
PH UR STRIP: 6.5 [PH] (ref 5–8)
PLATELET # BLD AUTO: 206 K/UL (ref 150–400)
PMV BLD AUTO: 10.4 FL (ref 8.9–12.9)
POTASSIUM SERPL-SCNC: 4.3 MMOL/L (ref 3.5–5.1)
PROT SERPL-MCNC: 7.9 G/DL (ref 6.4–8.2)
PROT UR STRIP-MCNC: NEGATIVE MG/DL
PROTHROMBIN TIME: 10.2 SEC (ref 9–11.1)
RBC # BLD AUTO: 3.99 M/UL (ref 3.8–5.2)
RBC #/AREA URNS HPF: ABNORMAL /HPF (ref 0–5)
SODIUM SERPL-SCNC: 142 MMOL/L (ref 136–145)
SP GR UR REFRACTOMETRY: 1.01 (ref 1–1.03)
TROPONIN I SERPL-MCNC: 0.13 NG/ML
TROPONIN I SERPL-MCNC: 0.15 NG/ML
UROBILINOGEN UR QL STRIP.AUTO: 0.2 EU/DL (ref 0.2–1)
WBC # BLD AUTO: 7 K/UL (ref 3.6–11)
WBC URNS QL MICRO: ABNORMAL /HPF (ref 0–4)

## 2018-03-07 PROCEDURE — 81001 URINALYSIS AUTO W/SCOPE: CPT | Performed by: EMERGENCY MEDICINE

## 2018-03-07 PROCEDURE — 74011636320 HC RX REV CODE- 636/320: Performed by: EMERGENCY MEDICINE

## 2018-03-07 PROCEDURE — 93005 ELECTROCARDIOGRAM TRACING: CPT

## 2018-03-07 PROCEDURE — 36415 COLL VENOUS BLD VENIPUNCTURE: CPT | Performed by: EMERGENCY MEDICINE

## 2018-03-07 PROCEDURE — 85025 COMPLETE CBC W/AUTO DIFF WBC: CPT | Performed by: EMERGENCY MEDICINE

## 2018-03-07 PROCEDURE — 83690 ASSAY OF LIPASE: CPT | Performed by: EMERGENCY MEDICINE

## 2018-03-07 PROCEDURE — 76705 ECHO EXAM OF ABDOMEN: CPT

## 2018-03-07 PROCEDURE — 99283 EMERGENCY DEPT VISIT LOW MDM: CPT

## 2018-03-07 PROCEDURE — 85610 PROTHROMBIN TIME: CPT | Performed by: EMERGENCY MEDICINE

## 2018-03-07 PROCEDURE — 74022 RADEX COMPL AQT ABD SERIES: CPT

## 2018-03-07 PROCEDURE — 83605 ASSAY OF LACTIC ACID: CPT | Performed by: EMERGENCY MEDICINE

## 2018-03-07 PROCEDURE — 84484 ASSAY OF TROPONIN QUANT: CPT | Performed by: EMERGENCY MEDICINE

## 2018-03-07 PROCEDURE — 80053 COMPREHEN METABOLIC PANEL: CPT | Performed by: EMERGENCY MEDICINE

## 2018-03-07 PROCEDURE — 74011250637 HC RX REV CODE- 250/637: Performed by: EMERGENCY MEDICINE

## 2018-03-07 PROCEDURE — 74176 CT ABD & PELVIS W/O CONTRAST: CPT

## 2018-03-07 RX ORDER — SODIUM CHLORIDE 0.9 % (FLUSH) 0.9 %
10 SYRINGE (ML) INJECTION
Status: DISCONTINUED | OUTPATIENT
Start: 2018-03-07 | End: 2018-03-08 | Stop reason: HOSPADM

## 2018-03-07 RX ORDER — CEPHALEXIN 250 MG/1
CAPSULE ORAL
Status: DISCONTINUED
Start: 2018-03-07 | End: 2018-03-08 | Stop reason: HOSPADM

## 2018-03-07 RX ORDER — CEPHALEXIN 250 MG/1
500 CAPSULE ORAL
Status: DISCONTINUED | OUTPATIENT
Start: 2018-03-07 | End: 2018-03-08 | Stop reason: HOSPADM

## 2018-03-07 RX ORDER — SODIUM CHLORIDE 9 MG/ML
50 INJECTION, SOLUTION INTRAVENOUS
Status: DISCONTINUED | OUTPATIENT
Start: 2018-03-07 | End: 2018-03-08 | Stop reason: HOSPADM

## 2018-03-07 RX ORDER — ACETAMINOPHEN 500 MG
1000 TABLET ORAL ONCE
Status: COMPLETED | OUTPATIENT
Start: 2018-03-07 | End: 2018-03-07

## 2018-03-07 RX ORDER — CEPHALEXIN 500 MG/1
500 CAPSULE ORAL 4 TIMES DAILY
Qty: 28 CAP | Refills: 0 | Status: SHIPPED | OUTPATIENT
Start: 2018-03-07 | End: 2018-03-14

## 2018-03-07 RX ADMIN — ACETAMINOPHEN 1000 MG: 500 TABLET ORAL at 15:52

## 2018-03-07 RX ADMIN — IOPAMIDOL 50 ML: 755 INJECTION, SOLUTION INTRAVENOUS at 22:28

## 2018-03-07 NOTE — ED PROVIDER NOTES
EMERGENCY DEPARTMENT HISTORY AND PHYSICAL EXAM      Date: 3/7/2018  Patient Name: Daiana Conway    History of Presenting Illness     Chief Complaint   Patient presents with    Back Pain     onset today, upper and lower    Abdominal Pain     generalized onset today, +n/v/d       History Provided By: Patient and Patient's Daughter    HPI: Daiana Conway, 80 y.o. female with PMHx significant for HTN, DM, CAD, cardiac catheterization, stent, pacemaker, presents ambulatory to the ED with cc of an intermittent moderate aching diffuse abdominal pain and mid to low back pain today. Pt reports she was inactive during onset of pain. She states abdominal pain is currently present. She denies any abdominal distension. Pt states she has not been passing gas today, but notes passing soft stool. Per daughter, pt felt nauseous and had an episode of vomiting today. She denies any hematemesis. Her daughter also states pt is not taking any medicines for DM. She denies other modifying factors. Pt denies other sxs including  fevers, chills, sore throat, cough, SOB, CP, diarrhea, dysuria, myalgias, HA, and rashes. Social hx: -Tobacco, -EtOH, -Drugs     PCP: Jess Crawford NP   Cardiology: Russ Lundberg MD    There are no other complaints, changes, or physical findings at this time. Current Facility-Administered Medications   Medication Dose Route Frequency Provider Last Rate Last Dose    0.9% sodium chloride infusion  50 mL/hr IntraVENous RAD ONCE Katia Mince, DO        sodium chloride (NS) flush 10 mL  10 mL IntraVENous RAD ONCE Katia Mince, DO        cephALEXin (KEFLEX) capsule 500 mg  500 mg Oral NOW Katia Mince, DO         Current Outpatient Prescriptions   Medication Sig Dispense Refill    cephALEXin (KEFLEX) 500 mg capsule Take 1 Cap by mouth four (4) times daily for 7 days.  28 Cap 0    metoprolol tartrate (LOPRESSOR) 25 mg tablet take 1/2 tablet by mouth twice a day 90 Tab 3    Cetirizine (ZYRTEC) 10 mg cap Take  by mouth daily.  nitroglycerin (NITROSTAT) 0.4 mg SL tablet 1 Tab by SubLINGual route every five (5) minutes as needed (call 911 if not relieved by 3). 25 Tab 1    ticagrelor (BRILINTA) 60 mg tab tablet Take 1 Tab by mouth two (2) times a day. 180 Tab 0    isosorbide mononitrate ER (IMDUR) 30 mg tablet take 1 tablet by mouth once daily TO HELP PREVENT CHEST PAIN 90 Tab 3    doxazosin (CARDURA) 2 mg tablet Take 2 mg by mouth daily. 1    magnesium hydroxide (ISIDRO MILK OF MAGNESIA) 400 mg/5 mL suspension Take 30 mL by mouth daily as needed for Constipation.  gabapentin (NEURONTIN) 100 mg capsule Take 2 Caps by mouth three (3) times daily. 180 Cap 1    metoprolol tartrate (LOPRESSOR) 25 mg tablet Take 0.5 Tabs by mouth two (2) times a day. 60 Tab 1    bumetanide (BUMEX) 1 mg tablet Take 1 Tab by mouth daily. 30 Tab 1    magnesium oxide (MAG-OX) 400 mg tablet Take 1 Tab by mouth daily. 30 Tab 1    pantoprazole (PROTONIX) 40 mg tablet Take 1 Tab by mouth Daily (before breakfast). 30 Tab 1    traMADol (ULTRAM) 50 mg tablet Take 1 Tab by mouth every six (6) hours as needed for Pain. Max Daily Amount: 200 mg. 40 Tab 0    ferrous sulfate 325 mg (65 mg iron) tablet Take 1 Tab by mouth two (2) times daily (with meals). 60 Tab 1    acetaminophen (TYLENOL) 325 mg tablet Take 1,300 mg by mouth nightly.  dextran 70-hypromellose (ARTIFICIAL TEARS) ophthalmic solution Administer 1 Drop to both eyes as needed.  lidocaine 5 % topical cream Apply  to affected area two (2) times daily as needed for Itching.  fluticasone (FLONASE) 50 mcg/actuation nasal spray 2 Sprays by Both Nostrils route daily.  allopurinol (ZYLOPRIM) 100 mg tablet Take  by mouth daily.  aspirin delayed-release 81 mg tablet Take 81 mg by mouth daily.  albuterol (PROAIR HFA) 90 mcg/actuation inhaler Take 1 puff by inhalation every six (6) hours as needed for Wheezing.       pravastatin (PRAVACHOL) 20 mg tablet Take 1 Tab by mouth nightly. 30 Tab 11    latanoprost (XALATAN) 0.005 % ophthalmic solution Administer 1 Drop to both eyes nightly. 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 No pertinent family history. Social History:  Social History   Substance Use Topics    Smoking status: Never Smoker    Smokeless tobacco: Never Used    Alcohol use No       Allergies: Allergies   Allergen Reactions    Pcn [Penicillins] Unknown (comments)         Review of Systems   Review of Systems   Constitutional: Negative for chills and fever. HENT: Negative for congestion and sore throat. Eyes: Negative for visual disturbance. Respiratory: Negative for cough and shortness of breath. Cardiovascular: Negative for chest pain and leg swelling. Gastrointestinal: Positive for abdominal pain, nausea and vomiting. Negative for abdominal distention, blood in stool and diarrhea. Negative for hematemesis. Endocrine: Negative for polyuria. Genitourinary: Negative for dysuria, flank pain, vaginal bleeding and vaginal discharge. Musculoskeletal: Positive for back pain. Negative for myalgias. Skin: Negative for rash. Allergic/Immunologic: Negative for immunocompromised state. Neurological: Negative for weakness and headaches. Psychiatric/Behavioral: Negative for confusion. Physical Exam   Physical Exam   Constitutional: She is oriented to person, place, and time. She appears well-developed and well-nourished. HENT:   Head: Normocephalic and atraumatic. Moist mucous membranes   Eyes: Conjunctivae are normal. Pupils are equal, round, and reactive to light. Right eye exhibits no discharge. Left eye exhibits no discharge. Neck: Normal range of motion. Neck supple. No tracheal deviation present. Cardiovascular: Normal rate, regular rhythm and normal heart sounds. No murmur heard. Pulmonary/Chest: Effort normal and breath sounds normal. No respiratory distress. She has no wheezes. She has no rales. Abdominal: Soft. Bowel sounds are normal. There is no rebound and no guarding. Slightly distended. Abdomen tympanic. Diffuse mild TTP. Musculoskeletal: Normal range of motion. She exhibits no edema, tenderness or deformity. Neurological: She is alert and oriented to person, place, and time. Skin: Skin is warm and dry. No rash noted. No erythema. Psychiatric: Her behavior is normal.   Nursing note and vitals reviewed.         Diagnostic Study Results     Labs -     Recent Results (from the past 12 hour(s))   EKG, 12 LEAD, INITIAL    Collection Time: 03/07/18  2:50 PM   Result Value Ref Range    Ventricular Rate 61 BPM    Atrial Rate 58 BPM    QRS Duration 170 ms    Q-T Interval 456 ms    QTC Calculation (Bezet) 459 ms    Calculated R Axis -56 degrees    Calculated T Axis 93 degrees    Diagnosis       AV sequential or dual chamber electronic pacemaker  When compared with ECG of 22-JUL-2016 15:09,  No significant change was found     URINALYSIS W/ RFLX MICROSCOPIC    Collection Time: 03/07/18  3:11 PM   Result Value Ref Range    Color YELLOW/STRAW      Appearance CLEAR CLEAR      Specific gravity 1.011 1.003 - 1.030      pH (UA) 6.5 5.0 - 8.0      Protein NEGATIVE  NEG mg/dL    Glucose NEGATIVE  NEG mg/dL    Ketone NEGATIVE  NEG mg/dL    Bilirubin NEGATIVE  NEG      Blood NEGATIVE  NEG      Urobilinogen 0.2 0.2 - 1.0 EU/dL    Nitrites NEGATIVE  NEG      Leukocyte Esterase SMALL (A) NEG      WBC 5-10 0 - 4 /hpf    RBC 0-5 0 - 5 /hpf    Epithelial cells FEW FEW /lpf    Bacteria 2+ (A) NEG /hpf    Hyaline cast 2-5 0 - 5 /lpf   CBC WITH AUTOMATED DIFF    Collection Time: 03/07/18  4:46 PM   Result Value Ref Range    WBC 7.0 3.6 - 11.0 K/uL    RBC 3.99 3.80 - 5.20 M/uL    HGB 12.7 11.5 - 16.0 g/dL    HCT 38.7 35.0 - 47.0 %    MCV 97.0 80.0 - 99.0 FL    MCH 31.8 26.0 - 34.0 PG    MCHC 32.8 30.0 - 36.5 g/dL    RDW 13.2 11.5 - 14.5 %    PLATELET 492 325 - 224 K/uL    MPV 10.4 8.9 - 12.9 FL    NRBC 0.0 0  WBC    ABSOLUTE NRBC 0.00 0.00 - 0.01 K/uL    NEUTROPHILS 74 32 - 75 %    LYMPHOCYTES 19 12 - 49 %    MONOCYTES 5 5 - 13 %    EOSINOPHILS 2 0 - 7 %    BASOPHILS 0 0 - 1 %    IMMATURE GRANULOCYTES 1 (H) 0.0 - 0.5 %    ABS. NEUTROPHILS 5.1 1.8 - 8.0 K/UL    ABS. LYMPHOCYTES 1.3 0.8 - 3.5 K/UL    ABS. MONOCYTES 0.4 0.0 - 1.0 K/UL    ABS. EOSINOPHILS 0.1 0.0 - 0.4 K/UL    ABS. BASOPHILS 0.0 0.0 - 0.1 K/UL    ABS. IMM. GRANS. 0.0 0.00 - 0.04 K/UL    DF AUTOMATED     PROTHROMBIN TIME + INR    Collection Time: 03/07/18  4:46 PM   Result Value Ref Range    INR 1.0 0.9 - 1.1      Prothrombin time 10.2 9.0 - 09.4 sec   METABOLIC PANEL, COMPREHENSIVE    Collection Time: 03/07/18  4:46 PM   Result Value Ref Range    Sodium 142 136 - 145 mmol/L    Potassium 4.3 3.5 - 5.1 mmol/L    Chloride 106 97 - 108 mmol/L    CO2 31 21 - 32 mmol/L    Anion gap 5 5 - 15 mmol/L    Glucose 174 (H) 65 - 100 mg/dL    BUN 24 (H) 6 - 20 MG/DL    Creatinine 1.30 (H) 0.55 - 1.02 MG/DL    BUN/Creatinine ratio 18 12 - 20      GFR est AA 47 (L) >60 ml/min/1.73m2    GFR est non-AA 38 (L) >60 ml/min/1.73m2    Calcium 9.9 8.5 - 10.1 MG/DL    Bilirubin, total 0.5 0.2 - 1.0 MG/DL    ALT (SGPT) 229 (H) 12 - 78 U/L    AST (SGOT) 417 (H) 15 - 37 U/L    Alk.  phosphatase 219 (H) 45 - 117 U/L    Protein, total 7.9 6.4 - 8.2 g/dL    Albumin 3.3 (L) 3.5 - 5.0 g/dL    Globulin 4.6 (H) 2.0 - 4.0 g/dL    A-G Ratio 0.7 (L) 1.1 - 2.2     LACTIC ACID    Collection Time: 03/07/18  4:46 PM   Result Value Ref Range    Lactic acid 1.3 0.4 - 2.0 MMOL/L   LIPASE    Collection Time: 03/07/18  4:46 PM   Result Value Ref Range    Lipase 174 73 - 393 U/L   TROPONIN I    Collection Time: 03/07/18  4:46 PM   Result Value Ref Range    Troponin-I, Qt. 0.15 (H) <0.05 ng/mL   TROPONIN I    Collection Time: 03/07/18  7:50 PM   Result Value Ref Range    Troponin-I, Qt. 0.13 (H) <0.05 ng/mL       Radiologic Studies -     CXR Results  (Last 48 hours)               03/07/18 1543  XR ABD ACUTE W 1 V CHEST Final result    Impression:  IMPRESSION:       Nonobstructive bowel gas pattern. No pneumonia or CHF. Narrative:  INDICATION:  Epigastric abdominal pain. COMPARISON:  8/13/2016. FINDINGS:  2 supine and upright views of the abdomen were obtained. The bowel gas pattern is nonobstructive. No free intraperitoneal air is appreciated. Aortoiliac atherosclerotic mural calcification is noted. Degenerative change of the spine is noted. An upright view of the chest was obtained. The heart is normal in size with left pacemaker. The lungs are clear. No consolidation or pulmonary edema is evident. Medical Decision Making   I am the first provider for this patient. I reviewed the vital signs, available nursing notes, past medical history, past surgical history, family history and social history. Vital Signs-Reviewed the patient's vital signs. Patient Vitals for the past 12 hrs:   Temp Pulse Resp BP SpO2   03/07/18 2113 97.4 °F (36.3 °C) 72 15 144/80 99 %   03/07/18 1954 97.5 °F (36.4 °C) 74 15 148/77 99 %   03/07/18 1345 97.6 °F (36.4 °C) 69 20 152/71 100 %     EKG interpretation: (Preliminary) 2:50 PM   Rhythm: paced; and irregular.  Rate (approx.): 61 bpm; Axis: normal;  QRS interval: 170 ms; QT/QTc: 456/459 ms; ST/T wave: normal;    Records Reviewed: Old Medical Records    Provider Notes (Medical Decision Making):   Patient presents with abdominal pain. DDx: Gastroenteritis, SBO, appendicitis, colitis, IBD, diverticulitis, mesenteric ischemia, AAA or descending dissection, ACS, ureteral stone. Will get labs and CT Abdomen/pelvis. ED Course:   Initial assessment performed. The patients presenting problems have been discussed, and they are in agreement with the care plan formulated and outlined with them. I have encouraged them to ask questions as they arise throughout their visit. 5:36 PM  Updated pt on lab and imaging results. Critical Care Time:   0    Disposition:  DISCHARGE NOTE  9:29 PM  The patient has been re-evaluated and is ready for discharge. Reviewed available results with patient. Counseled pt on diagnosis and care plan. Pt has expressed understanding, and all questions have been answered. Pt agrees with plan and agrees to F/U as recommended, or return to the ED if their sxs worsen. Discharge instructions have been provided and explained to the pt, along with reasons to return to the ED. PLAN:  1. Discharge Medication List as of 3/7/2018  9:27 PM      START taking these medications    Details   cephALEXin (KEFLEX) 500 mg capsule Take 1 Cap by mouth four (4) times daily for 7 days. , Normal, Disp-28 Cap, R-0         CONTINUE these medications which have NOT CHANGED    Details   !! metoprolol tartrate (LOPRESSOR) 25 mg tablet take 1/2 tablet by mouth twice a day, Normal, Disp-90 Tab, R-3      Cetirizine (ZYRTEC) 10 mg cap Take  by mouth daily. , Historical Med      nitroglycerin (NITROSTAT) 0.4 mg SL tablet 1 Tab by SubLINGual route every five (5) minutes as needed (call 911 if not relieved by 3). , Normal, Disp-25 Tab, R-1      ticagrelor (BRILINTA) 60 mg tab tablet Take 1 Tab by mouth two (2) times a day., Normal, Disp-180 Tab, R-0      isosorbide mononitrate ER (IMDUR) 30 mg tablet take 1 tablet by mouth once daily TO HELP PREVENT CHEST PAIN, Normal, Disp-90 Tab, R-3      doxazosin (CARDURA) 2 mg tablet Take 2 mg by mouth daily. , Historical Med, R-1      magnesium hydroxide (ISIDRO MILK OF MAGNESIA) 400 mg/5 mL suspension Take 30 mL by mouth daily as needed for Constipation. , Historical Med      gabapentin (NEURONTIN) 100 mg capsule Take 2 Caps by mouth three (3) times daily. , Print, Disp-180 Cap, R-1      !! metoprolol tartrate (LOPRESSOR) 25 mg tablet Take 0.5 Tabs by mouth two (2) times a day., Print, Disp-60 Tab, R-1      bumetanide (BUMEX) 1 mg tablet Take 1 Tab by mouth daily. , Print, Disp-30 Tab, R-1      magnesium oxide (MAG-OX) 400 mg tablet Take 1 Tab by mouth daily. , Print, Disp-30 Tab, R-1      pantoprazole (PROTONIX) 40 mg tablet Take 1 Tab by mouth Daily (before breakfast). , Print, Disp-30 Tab, R-1      traMADol (ULTRAM) 50 mg tablet Take 1 Tab by mouth every six (6) hours as needed for Pain. Max Daily Amount: 200 mg., Print, Disp-40 Tab, R-0      ferrous sulfate 325 mg (65 mg iron) tablet Take 1 Tab by mouth two (2) times daily (with meals). , Print, Disp-60 Tab, R-1      acetaminophen (TYLENOL) 325 mg tablet Take 1,300 mg by mouth nightly., Historical Med      dextran 70-hypromellose (ARTIFICIAL TEARS) ophthalmic solution Administer 1 Drop to both eyes as needed., Historical Med      lidocaine 5 % topical cream Apply  to affected area two (2) times daily as needed for Itching., Historical Med      fluticasone (FLONASE) 50 mcg/actuation nasal spray 2 Sprays by Both Nostrils route daily. , Historical Med      allopurinol (ZYLOPRIM) 100 mg tablet Take  by mouth daily. , Historical Med      aspirin delayed-release 81 mg tablet Take 81 mg by mouth daily. , Historical Med      albuterol (PROAIR HFA) 90 mcg/actuation inhaler Take 1 puff by inhalation every six (6) hours as needed for Wheezing., Historical Med pravastatin (PRAVACHOL) 20 mg tablet Take 1 Tab by mouth nightly. , Print, Disp-30 Tab, R-11      latanoprost (XALATAN) 0.005 % ophthalmic solution Administer 1 Drop to both eyes nightly., Historical Med       !! - Potential duplicate medications found. Please discuss with provider. 2.   Follow-up Information     Follow up With Details Comments Sabas Maddox [address], NP Call in 1 day  [phone]  [phone]      [facility] EMERGENCY DEPT  If symptoms worsen [address] [place] [address] Drive  [address] [facility]  [phone]        Return to ED if worse     Diagnosis     Clinical Impression:   1. Urinary tract infection without hematuria, site unspecified    2. Abdominal pain, generalized        Attestations: This note is prepared by Dory Carey, acting as Scribe for Edmundo Downing DO. The scribe's documentation has been prepared under my direction and personally reviewed by me in its entirety. I confirm that the note above accurately reflects all work, treatment, procedures, and medical decision making performed by me.   Edmundo Downing DO

## 2018-03-07 NOTE — ED NOTES
Assumed care of pt from triage. Pt in restroom and provided urine specimen prior to being roomed. Pt ate this morning;makerol cake and cornbread and eggs. Coffee,water and all morning meds. Pt then had a BM shortly after, stool was dark in color, no diarrhea or constipation. Then vomited but is unsure of the color; thin in consisently. Occurrence 45min-1 hour. Pt reports \"whole stomach hurts\" and back-upper and lower.

## 2018-03-08 LAB
ATRIAL RATE: 58 BPM
CALCULATED R AXIS, ECG10: -56 DEGREES
CALCULATED T AXIS, ECG11: 93 DEGREES
DIAGNOSIS, 93000: NORMAL
Q-T INTERVAL, ECG07: 456 MS
QRS DURATION, ECG06: 170 MS
QTC CALCULATION (BEZET), ECG08: 459 MS
VENTRICULAR RATE, ECG03: 61 BPM

## 2018-03-08 NOTE — ED NOTES
Bedside shift change report given to [address] (oncoming nurse) by Kwame Benitez RN (offgoing nurse). Report included the following information SBAR, Kardex, ED Summary and MAR. Daughter in room at time of report. Pt in [place].

## 2018-03-08 NOTE — ED NOTES
Dr. Paige Pulse went over dc instructions with pt and family. No questions at this time. Pt to dc home via wheelchair with family.

## 2018-03-08 NOTE — DISCHARGE INSTRUCTIONS
Female Urinary Tract: Anatomy Sketch    Current as of: October 13, 2016  Content Version: 11.4  © 9310-5071 [employer]. Care instructions adapted under license by [employer] (which disclaims liability or warranty for this information). If you have questions about a medical condition or this instruction, always ask your healthcare professional. [address] any warranty or liability for your use of this information. Urinary Tract Infection in Women: Care Instructions  Your Care Instructions    A urinary tract infection, or UTI, is a general term for an infection anywhere between the kidneys and the urethra (where urine comes out). Most UTIs are bladder infections. They often cause pain or burning when you urinate. UTIs are caused by bacteria and can be cured with antibiotics. Be sure to complete your treatment so that the infection goes away. Follow-up care is a key part of your treatment and safety. Be sure to make and go to all appointments, and call your doctor if you are having problems. It's also a good idea to know your test results and keep a list of the medicines you take. How can you care for yourself at home? · Take your antibiotics as directed. Do not stop taking them just because you feel better. You need to take the full course of antibiotics. · Drink extra water and other fluids for the next day or two. This may help wash out the bacteria that are causing the infection. (If you have kidney, heart, or liver disease and have to limit fluids, talk with your doctor before you increase your fluid intake.)  · Avoid drinks that are carbonated or have caffeine. They can irritate the bladder. · Urinate often. Try to empty your bladder each time. · To relieve pain, take a hot bath or lay a heating pad set on low over your lower belly or genital area. Never go to sleep with a heating pad in place. To prevent UTIs  · Drink plenty of water each day. This helps you urinate often, which clears bacteria from your system. (If you have kidney, heart, or liver disease and have to limit fluids, talk with your doctor before you increase your fluid intake.)  · Urinate when you need to. · Urinate right after you have sex. · Change sanitary pads often. · Avoid douches, bubble baths, feminine hygiene sprays, and other feminine hygiene products that have deodorants. · After going to the bathroom, wipe from front to back. When should you call for help? Call your doctor now or seek immediate medical care if:  ? · Symptoms such as fever, chills, nausea, or vomiting get worse or appear for the first time. ? · You have new pain in your back just below your rib cage. This is called flank pain. ? · There is new blood or pus in your urine. ? · You have any problems with your antibiotic medicine. ? Watch closely for changes in your health, and be sure to contact your doctor if:  ? · You are not getting better after taking an antibiotic for 2 days. ? · Your symptoms go away but then come back. Where can you learn more? Go to http://nany-ariella.info/. Enter A471 in the search box to learn more about \"Urinary Tract Infection in Women: Care Instructions. \"  Current as of: May 12, 2017  Content Version: 11.4  © 8500-9537 [employer]. Care instructions adapted under license by [employer] (which disclaims liability or warranty for this information). If you have questions about a medical condition or this instruction, always ask your healthcare professional. [place] [address] any warranty or liability for your use of this information.

## 2018-03-30 RX ORDER — ISOSORBIDE MONONITRATE 30 MG/1
TABLET, EXTENDED RELEASE ORAL
Qty: 90 TAB | Refills: 3 | Status: SHIPPED | OUTPATIENT
Start: 2018-03-30 | End: 2019-06-08 | Stop reason: SDUPTHER

## 2018-04-21 DIAGNOSIS — I25.10 CORONARY ARTERY DISEASE INVOLVING NATIVE CORONARY ARTERY OF NATIVE HEART WITHOUT ANGINA PECTORIS: ICD-10-CM

## 2018-04-23 RX ORDER — TICAGRELOR 60 MG/1
TABLET ORAL
Qty: 60 TAB | Refills: 11 | Status: SHIPPED | OUTPATIENT
Start: 2018-04-23 | End: 2019-04-30 | Stop reason: SDUPTHER

## 2018-05-31 ENCOUNTER — HOSPITAL ENCOUNTER (OUTPATIENT)
Dept: GENERAL RADIOLOGY | Age: 83
Discharge: HOME | End: 2018-05-31
Payer: MEDICARE

## 2018-05-31 ENCOUNTER — HOSPITAL ENCOUNTER (OUTPATIENT)
Dept: LAB | Age: 83
Discharge: HOME | End: 2018-05-31
Payer: MEDICARE

## 2018-05-31 ENCOUNTER — OFFICE VISIT (OUTPATIENT)
Dept: CARDIOLOGY CLINIC | Age: 83
End: 2018-05-31

## 2018-05-31 VITALS
SYSTOLIC BLOOD PRESSURE: 116 MMHG | WEIGHT: 169 LBS | BODY MASS INDEX: 35.48 KG/M2 | DIASTOLIC BLOOD PRESSURE: 78 MMHG | HEART RATE: 84 BPM | HEIGHT: 58 IN

## 2018-05-31 DIAGNOSIS — Z98.61 S/P PTCA (PERCUTANEOUS TRANSLUMINAL CORONARY ANGIOPLASTY): ICD-10-CM

## 2018-05-31 DIAGNOSIS — K92.2 GASTROINTESTINAL HEMORRHAGE, UNSPECIFIED GASTROINTESTINAL HEMORRHAGE TYPE: ICD-10-CM

## 2018-05-31 DIAGNOSIS — I25.10 CORONARY ARTERY DISEASE INVOLVING NATIVE CORONARY ARTERY OF NATIVE HEART WITHOUT ANGINA PECTORIS: ICD-10-CM

## 2018-05-31 DIAGNOSIS — I44.1 MOBITZ TYPE II ATRIOVENTRICULAR BLOCK: ICD-10-CM

## 2018-05-31 DIAGNOSIS — Z95.0 PACEMAKER: ICD-10-CM

## 2018-05-31 DIAGNOSIS — I25.10 CORONARY ARTERY DISEASE INVOLVING NATIVE CORONARY ARTERY OF NATIVE HEART WITHOUT ANGINA PECTORIS: Primary | ICD-10-CM

## 2018-05-31 DIAGNOSIS — I50.32 CHRONIC DIASTOLIC HEART FAILURE (HCC): ICD-10-CM

## 2018-05-31 PROCEDURE — 80053 COMPREHEN METABOLIC PANEL: CPT

## 2018-05-31 PROCEDURE — 83880 ASSAY OF NATRIURETIC PEPTIDE: CPT

## 2018-05-31 PROCEDURE — 36415 COLL VENOUS BLD VENIPUNCTURE: CPT

## 2018-05-31 PROCEDURE — 83735 ASSAY OF MAGNESIUM: CPT

## 2018-05-31 PROCEDURE — 71046 X-RAY EXAM CHEST 2 VIEWS: CPT

## 2018-05-31 NOTE — PROGRESS NOTES
Chief Complaint   Patient presents with    CHF     6 MO. F/U    Shortness of Breath     1. Have you been to the ER, urgent care clinic since your last visit? Hospitalized since your last visit? YES, ED [facility] ON 3/7/18 FOR BACK AND ABDOMINAL PAIN     2. Have you seen or consulted any other health care providers outside of the [facility] since your last visit? Include any pap smears or colon screening.  YES, PCP

## 2018-05-31 NOTE — PROGRESS NOTES
[address]  [phone]     Subjective:      Marybel Pak is a 80 y.o. female is here for routine f/u. The patient's daughters point out that she appeared short of breath at times, sometimes with exertion, sometimes at rest.  She has been using her inhaler more lately and she states that it does help. Weight has been stable. The patient denies chest pain/ orthopnea, PND, LE edema, palpitations, syncope, or presyncope.        Patient Active Problem List    Diagnosis Date Noted    Chronic diastolic heart failure ([address] 08/14/2016    Acute blood loss anemia 08/13/2016    GI bleed 08/13/2016    Elevated troponin 08/13/2016    CKD (chronic kidney disease) stage 3, GFR 30-59 ml/min 08/13/2016    Coronary artery disease involving native coronary artery without angina pectoris 11/12/2015    S/P PTCA (percutaneous transluminal coronary angioplasty) 04/09/2015    Pacemaker 03/09/2015    Mobitz type II atrioventricular block 03/07/2015    Syncope and collapse 03/06/2015    CAD (coronary artery disease), native coronary artery 01/03/2015    Chest pain 08/14/2014    NSTEMI (non-ST elevated myocardial infarction) ([address] 04/07/2013    Other nonspecific abnormal serum enzyme levels 04/06/2013    Sick sinus syndrome ([address] 04/06/2013    Dehydration 04/06/2013    ACS (acute coronary syndrome) ([address] 04/06/2013    Hypertension 04/06/2013      Lester Hale NP  Past Medical History:   Diagnosis Date    Asthma     CAD (coronary artery disease)     Diabetes ([address]     Hypertension     Irregular heart rate 4/25/13    Morbid obesity ([address]     Other ill-defined conditions(799.89)     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 Social History     Social History    Marital status:      Spouse name: N/A    Number of children: N/A    Years of education: N/A     Occupational History    Not on file. Social History Main Topics    Smoking status: Never Smoker    Smokeless tobacco: Never Used    Alcohol use No    Drug use: No    Sexual activity: Yes     Partners: Male     Other Topics Concern    Not on file     Social History Narrative      Current Outpatient Prescriptions   Medication Sig    morphine sulfate/PF (MORPHINE, PF,) soln by Intrathecal route continuous. CREAM APPLIED DAILY FOR FOOT PAIN    BRILINTA 60 mg tab tablet take 1 tablet by mouth twice a day    isosorbide mononitrate ER (IMDUR) 30 mg tablet take 1 tablet by mouth once daily TO HELP PREVENT CHEST PAIN    Cetirizine (ZYRTEC) 10 mg cap Take  by mouth daily.  nitroglycerin (NITROSTAT) 0.4 mg SL tablet 1 Tab by SubLINGual route every five (5) minutes as needed (call 911 if not relieved by 3).  ticagrelor (BRILINTA) 60 mg tab tablet Take 1 Tab by mouth two (2) times a day.  doxazosin (CARDURA) 2 mg tablet Take 2 mg by mouth daily.  magnesium hydroxide (GradFly MILK OF MAGNESIA) 400 mg/5 mL suspension Take 30 mL by mouth daily as needed for Constipation.  gabapentin (NEURONTIN) 100 mg capsule Take 2 Caps by mouth three (3) times daily.  metoprolol tartrate (LOPRESSOR) 25 mg tablet Take 0.5 Tabs by mouth two (2) times a day.  bumetanide (BUMEX) 1 mg tablet Take 1 Tab by mouth daily.  magnesium oxide (MAG-OX) 400 mg tablet Take 1 Tab by mouth daily.  traMADol (ULTRAM) 50 mg tablet Take 1 Tab by mouth every six (6) hours as needed for Pain. Max Daily Amount: 200 mg.     ferrous sulfate 325 mg (65 mg iron) tablet Take 1 Tab by mouth two (2) times daily (with meals).  acetaminophen (TYLENOL) 325 mg tablet Take 1,300 mg by mouth nightly.  dextran 70-hypromellose (ARTIFICIAL TEARS) ophthalmic solution Administer 1 Drop to both eyes as needed.  lidocaine 5 % topical cream Apply  to affected area two (2) times daily as needed for Itching.  fluticasone (FLONASE) 50 mcg/actuation nasal spray 2 Sprays by Both Nostrils route daily.  allopurinol (ZYLOPRIM) 100 mg tablet Take  by mouth daily.  aspirin delayed-release 81 mg tablet Take 81 mg by mouth daily.  albuterol (PROAIR HFA) 90 mcg/actuation inhaler Take 2 Puffs by inhalation every six (6) hours as needed for Wheezing. 2 PUFFS 4 TIMES DAILY    pravastatin (PRAVACHOL) 20 mg tablet Take 1 Tab by mouth nightly.  latanoprost (XALATAN) 0.005 % ophthalmic solution Administer 1 Drop to both eyes nightly.  pantoprazole (PROTONIX) 40 mg tablet Take 1 Tab by mouth Daily (before breakfast). No current facility-administered medications for this visit. Review of Symptoms:  11 systems reviewed, negative other than as stated in the HPI    Physical ExamPhysical Exam:    Vitals:    05/31/18 1010 05/31/18 1032   BP: 124/78 116/78   Pulse: 84    Weight: 169 lb (76.7 kg)    Height: 4' 10\" (1.473 m)      Body mass index is 35.32 kg/(m^2). General PE   Gen:  NAD  Mental Status - Alert. General Appearance - Not in acute distress. Chest and Lung Exam   Inspection: Accessory muscles - No use of accessory muscles in breathing. Auscultation:   Breath sounds: -Crackles at the bilateral bases   Cardiovascular   Inspection: Jugular vein - Bilateral - Inspection Normal.   Palpation/Percussion:   Apical Impulse: - Normal.   Auscultation: Rhythm - Regular. Heart Sounds - S1 WNL and S2 WNL. No S3 or S4. Murmurs & Other Heart Sounds: Auscultation of the heart reveals - No Murmurs.    Peripheral Vascular   Upper Extremity: Inspection - Bilateral - No Cyanotic nailbeds or Digital clubbing. Lower Extremity:   Palpation: Edema - Bilateral - No edema. Abdomen:   Soft, non-tender, bowel sounds are active. Neuro: A&O times 3, CN and motor grossly WNL    Labs:   Lab Results   Component Value Date/Time    Cholesterol, total 128 03/28/2017 10:46 AM    Cholesterol, total 107 01/19/2015 09:35 AM    Cholesterol, total 115 01/05/2015 02:25 AM    Cholesterol, total 73 04/08/2013 03:40 AM    Cholesterol, total 88 04/07/2013 03:35 AM    HDL Cholesterol 43 03/28/2017 10:46 AM    HDL Cholesterol 50 01/19/2015 09:35 AM    HDL Cholesterol 46 01/05/2015 02:25 AM    HDL Cholesterol 23 04/08/2013 03:40 AM    HDL Cholesterol 27 04/07/2013 03:35 AM    LDL, calculated 70 03/28/2017 10:46 AM    LDL, calculated 46 01/19/2015 09:35 AM    LDL, calculated 54 01/05/2015 02:25 AM    LDL, calculated 36.6 04/08/2013 03:40 AM    LDL, calculated 48 04/07/2013 03:35 AM    Triglyceride 76 03/28/2017 10:46 AM    Triglyceride 55 01/19/2015 09:35 AM    Triglyceride 75 01/05/2015 02:25 AM    Triglyceride 67 04/08/2013 03:40 AM    Triglyceride 65 04/07/2013 03:35 AM    CHOL/HDL Ratio 2.5 01/05/2015 02:25 AM    CHOL/HDL Ratio 3.2 04/08/2013 03:40 AM    CHOL/HDL Ratio 3.3 04/07/2013 03:35 AM     Lab Results   Component Value Date/Time    CK 75 07/22/2016 02:28 PM     Lab Results   Component Value Date/Time    Sodium 142 03/07/2018 04:46 PM    Potassium 4.3 03/07/2018 04:46 PM    Chloride 106 03/07/2018 04:46 PM    CO2 31 03/07/2018 04:46 PM    Anion gap 5 03/07/2018 04:46 PM    Glucose 174 (H) 03/07/2018 04:46 PM    BUN 24 (H) 03/07/2018 04:46 PM    Creatinine 1.30 (H) 03/07/2018 04:46 PM    BUN/Creatinine ratio 18 03/07/2018 04:46 PM    GFR est AA 47 (L) 03/07/2018 04:46 PM    GFR est non-AA 38 (L) 03/07/2018 04:46 PM    Calcium 9.9 03/07/2018 04:46 PM    Bilirubin, total 0.5 03/07/2018 04:46 PM    AST (SGOT) 417 (H) 03/07/2018 04:46 PM    Alk.  phosphatase 219 (H) 03/07/2018 04:46 PM    Protein, total 7.9 03/07/2018 04:46 PM Albumin 3.3 (L) 03/07/2018 04:46 PM    Globulin 4.6 (H) 03/07/2018 04:46 PM    A-G Ratio 0.7 (L) 03/07/2018 04:46 PM    ALT (SGPT) 229 (H) 03/07/2018 04:46 PM       EKG:  AV paced     Assessment:        1. Coronary artery disease involving native coronary artery of native heart without angina pectoris    2. Chronic diastolic heart failure ([address]    3. Mobitz type II atrioventricular block    4. Gastrointestinal hemorrhage, unspecified gastrointestinal hemorrhage type    5. S/P PTCA (percutaneous transluminal coronary angioplasty)    6. Pacemaker        Orders Placed This Encounter    XR CHEST PA LAT     Standing Status:   Future     Standing Expiration Date:   6/30/2018     Order Specific Question:   Reason for Exam     Answer:   R/o HF    METABOLIC PANEL, COMPREHENSIVE     Standing Status:   Future     Standing Expiration Date:   6/30/2018    MAGNESIUM     Standing Status:   Future     Standing Expiration Date:   6/30/2018    BNP     Standing Status:   Future     Standing Expiration Date:   6/30/2018    AMB POC EKG ROUTINE W/ 12 LEADS, INTER & REP     Order Specific Question:   Reason for Exam:     Answer:   ROUTINE    morphine sulfate/PF (MORPHINE, PF,) soln     Sig: by Intrathecal route continuous. CREAM APPLIED DAILY FOR FOOT PAIN        Plan:     1. Atherosclerotic heart disease: Clinically stable asymptomatic: To continue dual antiplatelet therapy nonobstructive stents via angiography March 2016. Although no recent use, refilled nitroglycerin today at her request.  Maintain Brilinta 60mg bid lifelong with h/o bifurcation stenting and NSTEMI thereafter with no new coronary lesions- ? Thrombus resolved. GI evaluation 8/16 for low grade GIB see DC summary and OK to restart Brilinta per Dr. Hugh Huang. 2.  Hypertension controlled continue current therapy  3. Diastolic heart failure with chronic kidney disease: There are a few crackles at the bases of her lungsquestion diastolic heart failure or atelectasis. Check chest x-ray and BNP. Her daughters will call if they have not heard about the results by tomorrow at 1 PM.  Consider adjustment of diuretic as needed. 4.  Sick sinus syndrome: 100% paced followed by electrophysiology  5. Hyperlipidemia: On statin therapy, managed by PCP. Follow-up to be determined based on chest x-ray and BNP. If evidence of diastolic heart failure, likely follow-up in 1 month after medication adjustment and repeat labs.   If no evidence of heart failure, follow-up with PCP to discuss shortness of breath and with me in 6 months.       Conrado Pruitt MD

## 2018-05-31 NOTE — MR AVS SNAPSHOT
[address] [facility] 
[phone] Patient: Delroy Hahn MRN: [id number] Visit Information Date & Time Provider Department Dept. Phone Encounter #  
 2018 10:00 AM Naveen Espinal, [address] [facility] [phone] [id number] Your Appointments 2018  3:15 PM  
ESTABLISHED PATIENT with Kwame Almazan, [facility] Appt Note: Dr Talia Lay and pm check 1 year,jar  
 [address]  
[phone] [address] [place] [address]  
  
    
 2018  3:15 PM  
PROCEDURE with PACEMAKER, [facility] Appt Note: Dr Talia Lay and pm check 1 year,jar  
 [address]  
[phone] [address] Upcoming Health Maintenance Date Due DTaP/Tdap/Td series (1 - Tdap) 1948 ZOSTER VACCINE AGE 60> 3/17/1987 GLAUCOMA SCREENING Q2Y 1992 Bone Densitometry (Dexa) Screening 1992 Pneumococcal 65+ Low/Medium Risk (2 of 2 - PCV13) 2014 MEDICARE YEARLY EXAM 3/14/2018 Influenza Age 5 to Adult 2018 Allergies as of 2018  Review Complete On: 2018 By: Naveen Espinal MD  
  
 Severity Noted Reaction Type Reactions Pcn [Penicillins]  2013    Unknown (comments) Current Immunizations  Reviewed on 3/6/2015 Name Date Influenza Vaccine 10/1/2014 Pneumococcal Polysaccharide (PPSV-23) 2013  6:38 PM  
  
 Not reviewed this visit You Were Diagnosed With   
  
 Codes Comments Coronary artery disease involving native coronary artery of native heart without angina pectoris    -  Primary ICD-10-CM: I25.10 ICD-9-CM: 414.01  Chronic diastolic heart failure (HCC)     ICD-10-CM: I50.32 
ICD-9-CM: 428.32   
 Mobitz type II atrioventricular block     ICD-10-CM: I44.1 ICD-9-CM: 426.12 Gastrointestinal hemorrhage, unspecified gastrointestinal hemorrhage type     ICD-10-CM: K92.2 ICD-9-CM: 578.9 S/P PTCA (percutaneous transluminal coronary angioplasty)     ICD-10-CM: Z98.61 ICD-9-CM: V45.82 Pacemaker     ICD-10-CM: Z95.0 ICD-9-CM: V45.01 Vitals BP Pulse Height(growth percentile) Weight(growth percentile) BMI OB Status 116/78 (BP 1 Location: Right arm, BP Patient Position: Sitting) 84 4' 10\" (1.473 m) 169 lb (76.7 kg) 35.32 kg/m2 Menopause Smoking Status Never Smoker Vitals History BMI and BSA Data Body Mass Index Body Surface Area  
 35.32 kg/m 2 1.77 m 2 Preferred Pharmacy Pharmacy Name Phone [facility] Your Updated Medication List  
  
   
This list is accurate as of 5/31/18 11:11 AM.  Always use your most recent med list.  
  
  
  
  
 allopurinol 100 mg tablet Commonly known as:  [place] Take  by mouth daily. ARTIFICIAL TEARS([id number]) ophthalmic solution Generic drug:  dextran 70-hypromellose Administer 1 Drop to both eyes as needed. aspirin delayed-release 81 mg tablet Take 81 mg by mouth daily. bumetanide 1 mg tablet Commonly known as:  [place] Take 1 Tab by mouth daily. doxazosin 2 mg tablet Commonly known as:  CARDURA Take 2 mg by mouth daily. ferrous sulfate 325 mg (65 mg iron) tablet Take 1 Tab by mouth two (2) times daily (with meals). FLONASE 50 mcg/actuation nasal spray Generic drug:  fluticasone 2 Sprays by Both Nostrils route daily. gabapentin 100 mg capsule Commonly known as:  NEURONTIN Take 2 Caps by mouth three (3) times daily. isosorbide mononitrate ER 30 mg tablet Commonly known as:  IMDUR  
take 1 tablet by mouth once daily TO HELP PREVENT CHEST PAIN  
  
 latanoprost 0.005 % ophthalmic solution Commonly known as:  Emilee Adjutant Administer 1 Drop to both eyes nightly. lidocaine 5 % topical cream  
Apply  to affected area two (2) times daily as needed for Itching.  
  
 magnesium oxide 400 mg tablet Commonly known as:  MAG-OX Take 1 Tab by mouth daily. metoprolol tartrate 25 mg tablet Commonly known as:  LOPRESSOR Take 0.5 Tabs by mouth two (2) times a day. morphine (PF) Soln  
by Intrathecal route continuous. CREAM APPLIED DAILY FOR FOOT PAIN  
  
 nitroglycerin 0.4 mg SL tablet Commonly known as:  NITROSTAT  
1 Tab by SubLINGual route every five (5) minutes as needed (call 911 if not relieved by 3). pantoprazole 40 mg tablet Commonly known as:  PROTONIX Take 1 Tab by mouth Daily (before breakfast). ISIDRO MILK OF MAGNESIA 400 mg/5 mL suspension Generic drug:  magnesium hydroxide Take 30 mL by mouth daily as needed for Constipation. pravastatin 20 mg tablet Commonly known as:  PRAVACHOL Take 1 Tab by mouth nightly. PROAIR HFA 90 mcg/actuation inhaler Generic drug:  albuterol Take 2 Puffs by inhalation every six (6) hours as needed for Wheezing. 2 PUFFS 4 TIMES DAILY * ticagrelor 60 mg Tab tablet Commonly known as:  [employer] Take 1 Tab by mouth two (2) times a day. * BRILINTA 60 mg Tab tablet Generic drug:  ticagrelor  
take 1 tablet by mouth twice a day  
  
 traMADol 50 mg tablet Commonly known as:  ULTRAM  
Take 1 Tab by mouth every six (6) hours as needed for Pain. Max Daily Amount: 200 mg.  
  
 TYLENOL 325 mg tablet Generic drug:  acetaminophen Take 1,300 mg by mouth nightly. ZyrTEC 10 mg Cap Generic drug:  Cetirizine Take  by mouth daily. * Notice: This list has 2 medication(s) that are the same as other medications prescribed for you. Read the directions carefully, and ask your doctor or other care provider to review them with you. We Performed the Following AMB POC EKG ROUTINE W/ 12 LEADS, INTER & REP [62065 CPT(R)] To-Do List   
 05/31/2018 Lab:  BNP   
  
 05/31/2018 Lab:  MAGNESIUM   
  
 05/31/2018 Lab:  METABOLIC PANEL, COMPREHENSIVE   
  
 05/31/2018 Imaging:  XR CHEST PA LAT Introducing [facility]! Dhara De Paz introduces [employer] patient portal. Now you can access parts of your medical record, email your doctor's office, and request medication refills online. 1. In your internet browser, go to [email or web address] 2. Click on the First Time User? Click Here link in the Sign In box. You will see the New Member Sign Up page. 3. Enter your D1[employer] Access Code exactly as it appears below. You will not need to use this code after youve completed the sign-up process. If you do not sign up before the expiration date, you must request a new code. · D1G Access Code: [email or web address] Expires: 8/29/2018 11:06 AM 
 
4. Enter the last four digits of your Social Security Number (xxxx) and Date of Birth (mm/dd/yyyy) as indicated and click Submit. You will be taken to the next sign-up page. 5. Create a D1G ID. This will be your D1[employer] login ID and cannot be changed, so think of one that is secure and easy to remember. 6. Create a D1G password. You can change your password at any time. 7. Enter your Password Reset Question and Answer. This can be used at a later time if you forget your password. 8. Enter your e-mail address. You will receive e-mail notification when new information is available in [address] 9. Click Sign Up. You can now view and download portions of your medical record. 10. Click the Download Summary menu link to download a portable copy of your medical information. If you have questions, please visit the Frequently Asked Questions section of the D1[employer] website.  Remember, D1[employer] is NOT to be used for urgent needs. For medical emergencies, dial 911. Now available from your iPhone and Android! Please provide this summary of care documentation to your next provider. Your primary care clinician is listed as Alissa Hightower. If you have any questions after today's visit, please call [phone].

## 2018-06-01 LAB
ALBUMIN SERPL-MCNC: 4 G/DL (ref 3.2–4.6)
ALBUMIN/GLOB SERPL: 1.4 {RATIO} (ref 1.2–2.2)
ALP SERPL-CCNC: 99 IU/L (ref 39–117)
ALT SERPL-CCNC: 15 IU/L (ref 0–32)
AST SERPL-CCNC: 17 IU/L (ref 0–40)
BILIRUB SERPL-MCNC: 0.3 MG/DL (ref 0–1.2)
BNP SERPL-MCNC: 59.5 PG/ML (ref 0–100)
BUN SERPL-MCNC: 29 MG/DL (ref 10–36)
BUN/CREAT SERPL: 32 (ref 12–28)
CALCIUM SERPL-MCNC: 10.3 MG/DL (ref 8.7–10.3)
CHLORIDE SERPL-SCNC: 101 MMOL/L (ref 96–106)
CO2 SERPL-SCNC: 27 MMOL/L (ref 18–29)
CREAT SERPL-MCNC: 0.91 MG/DL (ref 0.57–1)
GFR SERPLBLD CREATININE-BSD FMLA CKD-EPI: 55 ML/MIN/1.73
GFR SERPLBLD CREATININE-BSD FMLA CKD-EPI: 64 ML/MIN/1.73
GLOBULIN SER CALC-MCNC: 2.8 G/DL (ref 1.5–4.5)
GLUCOSE SERPL-MCNC: 207 MG/DL (ref 65–99)
INTERPRETATION: NORMAL
MAGNESIUM SERPL-MCNC: 2 MG/DL (ref 1.6–2.3)
POTASSIUM SERPL-SCNC: 4.3 MMOL/L (ref 3.5–5.2)
PROT SERPL-MCNC: 6.8 G/DL (ref 6–8.5)
SODIUM SERPL-SCNC: 142 MMOL/L (ref 134–144)

## 2018-06-05 NOTE — PROGRESS NOTES
Chest x-ray does not show evidence of extra fluid on her lungs and her blood tests suggest that there is not extra fluid on her body. I do not think that her shortness of breath is due to congestive heart failure. Since her symptoms are better with inhalers, discuss further with PCP and/or a lung doctor.

## 2018-06-06 ENCOUNTER — TELEPHONE (OUTPATIENT)
Dept: CARDIOLOGY CLINIC | Age: 83
End: 2018-06-06

## 2018-06-06 NOTE — TELEPHONE ENCOUNTER
Notes Recorded by Jose Maria Power MD on 6/5/2018 at 12:58 PM  Chest x-ray does not show evidence of extra fluid on her lungs and her blood tests suggest that there is not extra fluid on her body.  I do not think that her shortness of breath is due to congestive heart failure.  Since her symptoms are better with inhalers, discuss further with PCP and/or a lung doctor. Spoke with Lisandro Burt on [facility] Verified patient with two identifiers. Referred back to PCP and recommended Dr Bradford Montoya group for pulmonary evaluation.

## 2018-06-14 ENCOUNTER — OFFICE VISIT (OUTPATIENT)
Dept: CARDIOLOGY CLINIC | Age: 83
End: 2018-06-14

## 2018-06-14 ENCOUNTER — CLINICAL SUPPORT (OUTPATIENT)
Dept: CARDIOLOGY CLINIC | Age: 83
End: 2018-06-14

## 2018-06-14 VITALS
OXYGEN SATURATION: 96 % | WEIGHT: 171.4 LBS | DIASTOLIC BLOOD PRESSURE: 70 MMHG | SYSTOLIC BLOOD PRESSURE: 130 MMHG | BODY MASS INDEX: 35.98 KG/M2 | HEIGHT: 58 IN | HEART RATE: 68 BPM | RESPIRATION RATE: 18 BRPM

## 2018-06-14 DIAGNOSIS — I10 ESSENTIAL HYPERTENSION: ICD-10-CM

## 2018-06-14 DIAGNOSIS — I44.1 MOBITZ TYPE II ATRIOVENTRICULAR BLOCK: ICD-10-CM

## 2018-06-14 DIAGNOSIS — I49.5 SICK SINUS SYNDROME (HCC): ICD-10-CM

## 2018-06-14 DIAGNOSIS — Z95.0 CARDIAC PACEMAKER IN SITU: Primary | ICD-10-CM

## 2018-06-14 DIAGNOSIS — Z95.0 PACEMAKER: ICD-10-CM

## 2018-06-14 DIAGNOSIS — I25.10 CORONARY ARTERY DISEASE INVOLVING NATIVE CORONARY ARTERY OF NATIVE HEART WITHOUT ANGINA PECTORIS: ICD-10-CM

## 2018-06-14 DIAGNOSIS — Z98.61 S/P PTCA (PERCUTANEOUS TRANSLUMINAL CORONARY ANGIOPLASTY): ICD-10-CM

## 2018-06-14 DIAGNOSIS — I24.9 ACS (ACUTE CORONARY SYNDROME) (HCC): ICD-10-CM

## 2018-06-14 DIAGNOSIS — I44.1 MOBITZ TYPE II ATRIOVENTRICULAR BLOCK: Primary | ICD-10-CM

## 2018-06-14 PROBLEM — E66.01 SEVERE OBESITY (BMI 35.0-39.9): Status: ACTIVE | Noted: 2018-06-14

## 2018-06-14 NOTE — PROGRESS NOTES
See scanned [facility] DC pacemaker check in chart. Next follow up 6 months in clinic for device check. Chargeable visit.

## 2018-06-14 NOTE — PROGRESS NOTES
Subjective:      Gay Jack is a 80 y.o. female is here for annual follow up with dual chamber PM.   The patient denies chest pain, orthopnea, PND, LE edema, palpitations, syncope, presyncope or fatigue. She reports dyspnea at times with frequent use of inhalers. Has not seen pulmonary yet as recommended by Dr Jakub Hanson.      Patient Active Problem List    Diagnosis Date Noted    Severe obesity (BMI 35.0-39.9) ([facility] [address] 06/14/2018    Chronic diastolic heart failure ([address] 08/14/2016    Acute blood loss anemia 08/13/2016    GI bleed 08/13/2016    Elevated troponin 08/13/2016    CKD (chronic kidney disease) stage 3, GFR 30-59 ml/min 08/13/2016    Coronary artery disease involving native coronary artery without angina pectoris 11/12/2015    S/P PTCA (percutaneous transluminal coronary angioplasty) 04/09/2015    Pacemaker 03/09/2015    Mobitz type II atrioventricular block 03/07/2015    Syncope and collapse 03/06/2015    CAD (coronary artery disease), native coronary artery 01/03/2015    Chest pain 08/14/2014    NSTEMI (non-ST elevated myocardial infarction) ([address] 04/07/2013    Other nonspecific abnormal serum enzyme levels 04/06/2013    Sick sinus syndrome ([address] 04/06/2013    Dehydration 04/06/2013    ACS (acute coronary syndrome) ([facility] [address] 04/06/2013    Hypertension 04/06/2013      Leslie Bailey NP  Past Medical History:   Diagnosis Date    Asthma     CAD (coronary artery disease)     Diabetes (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 negative for cardiac disease  Social History     Social History    Marital status:      Spouse name: N/A    Number of children: N/A    Years of education: N/A     Social History Main Topics    Smoking status: Never Smoker    Smokeless tobacco: Never Used    Alcohol use No    Drug use: No    Sexual activity: Yes     Partners: Male     Other Topics Concern    None     Social History Narrative     Current Outpatient Prescriptions   Medication Sig    morphine sulfate/PF (MORPHINE, PF,) soln by Intrathecal route continuous. CREAM APPLIED DAILY FOR FOOT PAIN    BRILINTA 60 mg tab tablet take 1 tablet by mouth twice a day    isosorbide mononitrate ER (IMDUR) 30 mg tablet take 1 tablet by mouth once daily TO HELP PREVENT CHEST PAIN    Cetirizine (ZYRTEC) 10 mg cap Take  by mouth daily.  nitroglycerin (NITROSTAT) 0.4 mg SL tablet 1 Tab by SubLINGual route every five (5) minutes as needed (call 911 if not relieved by 3).  ticagrelor (BRILINTA) 60 mg tab tablet Take 1 Tab by mouth two (2) times a day.  doxazosin (CARDURA) 2 mg tablet Take 2 mg by mouth daily.  magnesium hydroxide (ISIDRO MILK OF MAGNESIA) 400 mg/5 mL suspension Take 30 mL by mouth daily as needed for Constipation.  gabapentin (NEURONTIN) 100 mg capsule Take 2 Caps by mouth three (3) times daily.  metoprolol tartrate (LOPRESSOR) 25 mg tablet Take 0.5 Tabs by mouth two (2) times a day.  bumetanide (BUMEX) 1 mg tablet Take 1 Tab by mouth daily.  magnesium oxide (MAG-OX) 400 mg tablet Take 1 Tab by mouth daily.  traMADol (ULTRAM) 50 mg tablet Take 1 Tab by mouth every six (6) hours as needed for Pain. Max Daily Amount: 200 mg.  ferrous sulfate 325 mg (65 mg iron) tablet Take 1 Tab by mouth two (2) times daily (with meals).  acetaminophen (TYLENOL) 325 mg tablet Take 1,300 mg by mouth nightly.     dextran 70-hypromellose (ARTIFICIAL TEARS) ophthalmic solution Administer 1 Drop to both eyes as needed.  lidocaine 5 % topical cream Apply  to affected area two (2) times daily as needed for Itching.  fluticasone (FLONASE) 50 mcg/actuation nasal spray 2 Sprays by Both Nostrils route daily.  allopurinol (ZYLOPRIM) 100 mg tablet Take  by mouth daily.  aspirin delayed-release 81 mg tablet Take 81 mg by mouth daily.  albuterol (PROAIR HFA) 90 mcg/actuation inhaler Take 2 Puffs by inhalation every six (6) hours as needed for Wheezing. 2 PUFFS 4 TIMES DAILY    pravastatin (PRAVACHOL) 20 mg tablet Take 1 Tab by mouth nightly.  latanoprost (XALATAN) 0.005 % ophthalmic solution Administer 1 Drop to both eyes nightly.  pantoprazole (PROTONIX) 40 mg tablet Take 1 Tab by mouth Daily (before breakfast). No current facility-administered medications for this visit. Vitals:    06/14/18 1031   BP: 130/70   Pulse: 68   Resp: 18   SpO2: 96%   Weight: 171 lb 6.4 oz (77.7 kg)   Height: 4' 10\" (1.473 m)       I have reviewed the nurses notes, vitals, problem list, allergy list, medical history, family, social history and medications. Review of Symptoms:    General: Pt denies excessive weight gain or loss. Pt is able to conduct ADL's  HEENT: Denies blurred vision, headaches, epistaxis and difficulty swallowing. Respiratory: Denies shortness of breath, DOTSON, wheezing or stridor. Cardiovascular: Denies precordial pain, palpitations, edema or PND  Gastrointestinal: Denies poor appetite, indigestion, abdominal pain or blood in stool  Urinary: Denies dysuria, pyuria  Musculoskeletal: Denies pain or swelling from muscles or joints  Neurologic: Denies tremor, paresthesias, or sensory motor disturbance  Skin: Denies rash, itching or texture change. Psych: Denies depression      Physical Exam:      General: Well developed, in no acute distress.   HEENT: Eyes - PERRL, no jvd  Heart:  Normal S1/S2 negative S3 or S4. Regular, no murmur, gallop or rub.   Respiratory: Clear bilaterally x 4, no wheezing or rales  Extremities:  No edema, normal cap refill, no cyanosis. Musculoskeletal: No clubbing  Neuro: A&Ox3, speech clear, gait stable. Skin: Skin color is normal. No rashes or lesions. Non diaphoretic  Vascular: 2+ pulses symmetric in all extremities    Cardiographics    Ekg: AV PACED at 61 BPM    Results for orders placed or performed during the hospital encounter of 03/07/18   EKG, 12 LEAD, INITIAL   Result Value Ref Range    Ventricular Rate 61 BPM    Atrial Rate 58 BPM    QRS Duration 170 ms    Q-T Interval 456 ms    QTC Calculation (Bezet) 459 ms    Calculated R Axis -56 degrees    Calculated T Axis 93 degrees    Diagnosis       AV sequential or dual chamber electronic pacemaker  When compared with ECG of 22-JUL-2016 15:09,  No significant change was found  Confirmed by Mandi Solano [email or web address] ([id number]) on 3/8/2018 5:04:54 PM           Lab Results   Component Value Date/Time    WBC 7.0 03/07/2018 04:46 PM    HGB 12.7 03/07/2018 04:46 PM    HCT 38.7 03/07/2018 04:46 PM    PLATELET 753 31/10/4317 04:46 PM    MCV 97.0 03/07/2018 04:46 PM      Lab Results   Component Value Date/Time    Sodium 142 05/31/2018 12:06 PM    Potassium 4.3 05/31/2018 12:06 PM    Chloride 101 05/31/2018 12:06 PM    CO2 27 05/31/2018 12:06 PM    Anion gap 5 03/07/2018 04:46 PM    Glucose 207 (H) 05/31/2018 12:06 PM    BUN 29 05/31/2018 12:06 PM    Creatinine 0.91 05/31/2018 12:06 PM    BUN/Creatinine ratio 32 (H) 05/31/2018 12:06 PM    GFR est AA 64 05/31/2018 12:06 PM    GFR est non-AA 55 (L) 05/31/2018 12:06 PM    Calcium 10.3 05/31/2018 12:06 PM    Bilirubin, total 0.3 05/31/2018 12:06 PM    AST (SGOT) 17 05/31/2018 12:06 PM    Alk.  phosphatase 99 05/31/2018 12:06 PM    Protein, total 6.8 05/31/2018 12:06 PM    Albumin 4.0 05/31/2018 12:06 PM    Globulin 4.6 (H) 03/07/2018 04:46 PM    A-G Ratio 1.4 05/31/2018 12:06 PM    ALT (SGPT) 15 05/31/2018 12:06 PM No results found for: TSH, TSH2, TSH3, TSHP, TSHEXT, TSHEXT     Assessment:            ICD-10-CM ICD-9-CM    1. Mobitz type II atrioventricular block I44.1 426.12 AMB POC EKG ROUTINE W/ 12 LEADS, INTER & REP   2. Coronary artery disease involving native coronary artery of native heart without angina pectoris I25.10 414.01    3. Sick sinus syndrome (HCC) I49.5 427.81    4. ACS (acute coronary syndrome) (HCC) I24.9 411.1    5. Essential hypertension I10 401.9    6. S/P PTCA (percutaneous transluminal coronary angioplasty) Z98.61 V45.82    7. Pacemaker Z95.0 V45.01      Orders Placed This Encounter    AMB POC EKG ROUTINE W/ 12 LEADS, INTER & REP     Order Specific Question:   Reason for Exam:     Answer:   routine        Plan:     Ms Lillian Uriostegui is here for her annual follow up with dual chamber PM.  She is 45% AP and 48% RVP. Noted 10 episodes of PAF < 1 min in duration. With this low afib burden and her need for long term Brilinta, we will not initiate [address]. She has 5.5 years left on her battery. She remains short of breath which Dr Johnna Roy felt was non-cardiac. She has not seen pulmonary yet. I encouraged an appt with Pulmonary and then follow up with Dr Johnna Roy if no improvement. We will see her back in 1 year. Continue medical management for ASHD, HTN and SSS. Thank you for allowing me to participate in [place] [id number] 's care.

## 2018-06-14 NOTE — PROGRESS NOTES
1. Have you been to the ER, urgent care clinic since your last visit? Hospitalized since your last visit? No    2. Have you seen or consulted any other health care providers outside of the [address] [place] [address] since your last visit? Include any pap smears or colon screening. No     Chief Complaint   Patient presents with    Pacemaker Check     Yearly appt. C/O SOB with exertion.

## 2018-10-27 ENCOUNTER — HOSPITAL ENCOUNTER (EMERGENCY)
Age: 83
Discharge: HOME | End: 2018-10-27
Attending: EMERGENCY MEDICINE
Payer: MEDICARE

## 2018-10-27 VITALS
RESPIRATION RATE: 23 BRPM | WEIGHT: 171.3 LBS | HEIGHT: 58 IN | BODY MASS INDEX: 35.96 KG/M2 | HEART RATE: 72 BPM | TEMPERATURE: 97.7 F | DIASTOLIC BLOOD PRESSURE: 74 MMHG | OXYGEN SATURATION: 96 % | SYSTOLIC BLOOD PRESSURE: 121 MMHG

## 2018-10-27 DIAGNOSIS — R53.83 MALAISE AND FATIGUE: ICD-10-CM

## 2018-10-27 DIAGNOSIS — R53.81 MALAISE AND FATIGUE: ICD-10-CM

## 2018-10-27 DIAGNOSIS — N30.00 ACUTE CYSTITIS WITHOUT HEMATURIA: Primary | ICD-10-CM

## 2018-10-27 LAB
ALBUMIN SERPL-MCNC: 3.1 G/DL (ref 3.5–5)
ALBUMIN/GLOB SERPL: 0.7 {RATIO} (ref 1.1–2.2)
ALP SERPL-CCNC: 94 U/L (ref 45–117)
ALT SERPL-CCNC: 12 U/L (ref 12–78)
ANION GAP SERPL CALC-SCNC: 7 MMOL/L (ref 5–15)
APPEARANCE UR: ABNORMAL
AST SERPL-CCNC: 15 U/L (ref 15–37)
BACTERIA URNS QL MICRO: ABNORMAL /HPF
BASOPHILS # BLD: 0 K/UL (ref 0–0.1)
BASOPHILS NFR BLD: 0 % (ref 0–1)
BILIRUB SERPL-MCNC: 0.5 MG/DL (ref 0.2–1)
BILIRUB UR QL: NEGATIVE
BNP SERPL-MCNC: 358 PG/ML (ref 0–450)
BUN SERPL-MCNC: 29 MG/DL (ref 6–20)
BUN/CREAT SERPL: 24 (ref 12–20)
CALCIUM SERPL-MCNC: 10.5 MG/DL (ref 8.5–10.1)
CHLORIDE SERPL-SCNC: 104 MMOL/L (ref 97–108)
CO2 SERPL-SCNC: 28 MMOL/L (ref 21–32)
COLOR UR: ABNORMAL
CREAT SERPL-MCNC: 1.19 MG/DL (ref 0.55–1.02)
DIFFERENTIAL METHOD BLD: ABNORMAL
EOSINOPHIL # BLD: 0.3 K/UL (ref 0–0.4)
EOSINOPHIL NFR BLD: 3 % (ref 0–7)
EPITH CASTS URNS QL MICRO: ABNORMAL /LPF
ERYTHROCYTE [DISTWIDTH] IN BLOOD BY AUTOMATED COUNT: 13.2 % (ref 11.5–14.5)
GLOBULIN SER CALC-MCNC: 4.5 G/DL (ref 2–4)
GLUCOSE SERPL-MCNC: 173 MG/DL (ref 65–100)
GLUCOSE UR STRIP.AUTO-MCNC: NEGATIVE MG/DL
HCT VFR BLD AUTO: 37.6 % (ref 35–47)
HGB BLD-MCNC: 12.2 G/DL (ref 11.5–16)
HGB UR QL STRIP: NEGATIVE
HYALINE CASTS URNS QL MICRO: ABNORMAL /LPF (ref 0–5)
IMM GRANULOCYTES # BLD: 0.1 K/UL (ref 0–0.04)
IMM GRANULOCYTES NFR BLD AUTO: 2 % (ref 0–0.5)
KETONES UR QL STRIP.AUTO: NEGATIVE MG/DL
LEUKOCYTE ESTERASE UR QL STRIP.AUTO: ABNORMAL
LYMPHOCYTES # BLD: 1.9 K/UL (ref 0.8–3.5)
LYMPHOCYTES NFR BLD: 21 % (ref 12–49)
MCH RBC QN AUTO: 31.7 PG (ref 26–34)
MCHC RBC AUTO-ENTMCNC: 32.4 G/DL (ref 30–36.5)
MCV RBC AUTO: 97.7 FL (ref 80–99)
MONOCYTES # BLD: 0.6 K/UL (ref 0–1)
MONOCYTES NFR BLD: 7 % (ref 5–13)
MUCOUS THREADS URNS QL MICRO: ABNORMAL /LPF
NEUTS SEG # BLD: 6.1 K/UL (ref 1.8–8)
NEUTS SEG NFR BLD: 67 % (ref 32–75)
NITRITE UR QL STRIP.AUTO: NEGATIVE
NRBC # BLD: 0 K/UL (ref 0–0.01)
NRBC BLD-RTO: 0 PER 100 WBC
PH UR STRIP: 8.5 [PH] (ref 5–8)
PLATELET # BLD AUTO: 294 K/UL (ref 150–400)
PMV BLD AUTO: 9.1 FL (ref 8.9–12.9)
POTASSIUM SERPL-SCNC: 4 MMOL/L (ref 3.5–5.1)
PROT SERPL-MCNC: 7.6 G/DL (ref 6.4–8.2)
PROT UR STRIP-MCNC: 100 MG/DL
RBC # BLD AUTO: 3.85 M/UL (ref 3.8–5.2)
RBC #/AREA URNS HPF: ABNORMAL /HPF (ref 0–5)
SODIUM SERPL-SCNC: 139 MMOL/L (ref 136–145)
SP GR UR REFRACTOMETRY: 1.02 (ref 1–1.03)
TRI-PHOS CRY URNS QL MICRO: ABNORMAL
TROPONIN I SERPL-MCNC: 0.1 NG/ML
UROBILINOGEN UR QL STRIP.AUTO: 1 EU/DL (ref 0.2–1)
WBC # BLD AUTO: 9 K/UL (ref 3.6–11)
WBC URNS QL MICRO: ABNORMAL /HPF (ref 0–4)

## 2018-10-27 PROCEDURE — 74011250636 HC RX REV CODE- 250/636: Performed by: EMERGENCY MEDICINE

## 2018-10-27 PROCEDURE — 80053 COMPREHEN METABOLIC PANEL: CPT | Performed by: EMERGENCY MEDICINE

## 2018-10-27 PROCEDURE — 74011000258 HC RX REV CODE- 258: Performed by: EMERGENCY MEDICINE

## 2018-10-27 PROCEDURE — 96361 HYDRATE IV INFUSION ADD-ON: CPT

## 2018-10-27 PROCEDURE — 81001 URINALYSIS AUTO W/SCOPE: CPT | Performed by: EMERGENCY MEDICINE

## 2018-10-27 PROCEDURE — 96365 THER/PROPH/DIAG IV INF INIT: CPT

## 2018-10-27 PROCEDURE — 93005 ELECTROCARDIOGRAM TRACING: CPT

## 2018-10-27 PROCEDURE — 36415 COLL VENOUS BLD VENIPUNCTURE: CPT | Performed by: EMERGENCY MEDICINE

## 2018-10-27 PROCEDURE — 85025 COMPLETE CBC W/AUTO DIFF WBC: CPT | Performed by: EMERGENCY MEDICINE

## 2018-10-27 PROCEDURE — 99285 EMERGENCY DEPT VISIT HI MDM: CPT

## 2018-10-27 PROCEDURE — 84484 ASSAY OF TROPONIN QUANT: CPT | Performed by: EMERGENCY MEDICINE

## 2018-10-27 PROCEDURE — 83880 ASSAY OF NATRIURETIC PEPTIDE: CPT | Performed by: EMERGENCY MEDICINE

## 2018-10-27 RX ORDER — CEPHALEXIN 500 MG/1
500 CAPSULE ORAL 3 TIMES DAILY
Qty: 21 CAP | Refills: 0 | Status: SHIPPED | OUTPATIENT
Start: 2018-10-27 | End: 2018-11-03

## 2018-10-27 RX ORDER — PREDNISONE 10 MG/1
30 TABLET ORAL
Qty: 9 TAB | Refills: 0 | Status: SHIPPED | OUTPATIENT
Start: 2018-10-27 | End: 2018-10-30

## 2018-10-27 RX ADMIN — CEFTRIAXONE 1 G: 1 INJECTION, POWDER, FOR SOLUTION INTRAMUSCULAR; INTRAVENOUS at 18:24

## 2018-10-27 RX ADMIN — SODIUM CHLORIDE 1000 ML: 900 INJECTION, SOLUTION INTRAVENOUS at 17:09

## 2018-10-27 NOTE — ED PROVIDER NOTES
EMERGENCY DEPARTMENT HISTORY AND PHYSICAL EXAM 
 
 
Date: 10/27/2018 Patient Name: Vika Matos History of Presenting Illness Chief Complaint Patient presents with  Fall Patient to triage via wheelchair and states that she fell twice today and is unsure why.  Generalized Body Aches Patient also complain of joint pain \"due to arthitis\" History Provided By: Patient and Patient's Daughter HPI: Vika Matos, 80 y.o. female with PMHx significant for HTN, CAD, DM, and RA, presents via wheelchair to the ED with cc of ongoing generalized fatigue and SOB x \"some time now\". Pt reports she has had acute on chronic generalized arthralgias and swelling of b/l hands and feet. She states she fell twice this morning secondary to sx's and has since been using a wheelchair, however is typically ambulatory with a cane or rollator. Daughter reports pt has had decreased appetite and poor oral intake. Of note, pt also reports intermittent bouts of diarrhea. Pt specifically denies any fevers, nausea, vomiting, dysuria, frequency, constipation, and dizziness. There are no other complaints, changes, or physical findings at this time. PCP: Maritza Gallegos NP Current Facility-Administered Medications Medication Dose Route Frequency Provider Last Rate Last Dose  cefTRIAXone (ROCEPHIN) 1 g in 0.9% sodium chloride (MBP/ADV) 50 mL  1 g IntraVENous NOW TermeerSabina MD      
 
Current Outpatient Medications Medication Sig Dispense Refill  predniSONE (DELTASONE) 10 mg tablet Take 3 Tabs by mouth daily (with breakfast) for 3 days. 9 Tab 0  cephALEXin (KEFLEX) 500 mg capsule Take 1 Cap by mouth three (3) times daily for 7 days. 21 Cap 0  
 morphine sulfate/PF (MORPHINE, PF,) soln by Intrathecal route continuous.  CREAM APPLIED DAILY FOR FOOT PAIN    
 BRILINTA 60 mg tab tablet take 1 tablet by mouth twice a day 60 Tab 11  
  isosorbide mononitrate ER (IMDUR) 30 mg tablet take 1 tablet by mouth once daily TO HELP PREVENT CHEST PAIN 90 Tab 3  
 Cetirizine (ZYRTEC) 10 mg cap Take  by mouth daily.  nitroglycerin (NITROSTAT) 0.4 mg SL tablet 1 Tab by SubLINGual route every five (5) minutes as needed (call 911 if not relieved by 3). 25 Tab 1  
 ticagrelor (BRILINTA) 60 mg tab tablet Take 1 Tab by mouth two (2) times a day. 180 Tab 0  
 doxazosin (CARDURA) 2 mg tablet Take 2 mg by mouth daily. 1  
 magnesium hydroxide (ISIDRO MILK OF MAGNESIA) 400 mg/5 mL suspension Take 30 mL by mouth daily as needed for Constipation.  gabapentin (NEURONTIN) 100 mg capsule Take 2 Caps by mouth three (3) times daily. 180 Cap 1  
 metoprolol tartrate (LOPRESSOR) 25 mg tablet Take 0.5 Tabs by mouth two (2) times a day. 60 Tab 1  
 bumetanide (BUMEX) 1 mg tablet Take 1 Tab by mouth daily. 30 Tab 1  
 magnesium oxide (MAG-OX) 400 mg tablet Take 1 Tab by mouth daily. 30 Tab 1  
 pantoprazole (PROTONIX) 40 mg tablet Take 1 Tab by mouth Daily (before breakfast). 30 Tab 1  
 traMADol (ULTRAM) 50 mg tablet Take 1 Tab by mouth every six (6) hours as needed for Pain. Max Daily Amount: 200 mg. 40 Tab 0  
 ferrous sulfate 325 mg (65 mg iron) tablet Take 1 Tab by mouth two (2) times daily (with meals). 60 Tab 1  
 acetaminophen (TYLENOL) 325 mg tablet Take 1,300 mg by mouth nightly.  dextran 70-hypromellose (ARTIFICIAL TEARS) ophthalmic solution Administer 1 Drop to both eyes as needed.  lidocaine 5 % topical cream Apply  to affected area two (2) times daily as needed for Itching.  fluticasone (FLONASE) 50 mcg/actuation nasal spray 2 Sprays by Both Nostrils route daily.  allopurinol (ZYLOPRIM) 100 mg tablet Take  by mouth daily.  aspirin delayed-release 81 mg tablet Take 81 mg by mouth daily.     
 albuterol (PROAIR HFA) 90 mcg/actuation inhaler Take 2 Puffs by inhalation every six (6) hours as needed for Wheezing. 2 PUFFS 4 TIMES DAILY  pravastatin (PRAVACHOL) 20 mg tablet Take 1 Tab by mouth nightly. 30 Tab 11  
 latanoprost (XALATAN) 0.005 % ophthalmic solution Administer 1 Drop to both eyes nightly. Past History Past Medical History: 
Past Medical History:  
Diagnosis Date  Asthma  CAD (coronary artery disease)  Diabetes ([facility] [address]  Hypertension  Irregular heart rate 4/25/13  Morbid obesity ([facility] [address]  Other ill-defined conditions(799.89)   
 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 
Past Surgical History:  
Procedure Laterality Date  HX CHOLECYSTECTOMY  HX CORONARY STENT PLACEMENT    
 HX HEART CATHETERIZATION    
 HX PACEMAKER    
 HX PACEMAKER PLACEMENT Left Family History: 
History reviewed. No pertinent family history. Social History: 
Social History Tobacco Use  Smoking status: Never Smoker  Smokeless tobacco: Never Used Substance Use Topics  Alcohol use: No  
 Drug use: No  
 
 
Allergies: Allergies Allergen Reactions  Pcn [Penicillins] Unknown (comments) Review of Systems Review of Systems Constitutional: Positive for appetite change and fatigue. Negative for activity change, chills, fever and unexpected weight change. HENT: Negative for congestion. Eyes: Negative for pain and visual disturbance. Respiratory: Positive for shortness of breath. Negative for cough. Cardiovascular: Negative for chest pain. Gastrointestinal: Positive for diarrhea. Negative for abdominal pain, constipation, nausea and vomiting. Genitourinary: Negative for dysuria and frequency. Musculoskeletal: Positive for arthralgias and joint swelling. Negative for back pain. Skin: Negative for rash. Neurological: Negative for dizziness and headaches.   
 
Physical Exam  
Physical Exam  
 Constitutional: She is oriented to person, place, and time. She appears well-developed and well-nourished. Elderly female in mild distress HENT:  
Head: Normocephalic and atraumatic. Mouth/Throat: Oropharynx is clear and moist. Mucous membranes are dry. Eyes: Conjunctivae and EOM are normal. Pupils are equal, round, and reactive to light. Right eye exhibits no discharge. Left eye exhibits no discharge. Neck: Normal range of motion. Neck supple. Cardiovascular: Normal rate, regular rhythm and normal heart sounds. No murmur heard. Pulmonary/Chest: Effort normal and breath sounds normal. No respiratory distress. She has no wheezes. She has no rales. Abdominal: Soft. Bowel sounds are normal. She exhibits no distension. There is no tenderness. Musculoskeletal:  
Deformities to BUE in all digits with edema to both hands. BLE edema. Neurological: She is alert and oriented to person, place, and time. No cranial nerve deficit. She exhibits normal muscle tone. Skin: Skin is warm and dry. No rash noted. She is not diaphoretic. Nursing note and vitals reviewed. Diagnostic Study Results Labs - Recent Results (from the past 12 hour(s)) EKG, 12 LEAD, INITIAL Collection Time: 10/27/18  2:16 PM  
Result Value Ref Range Ventricular Rate 68 BPM  
 Atrial Rate 78 BPM  
 P-R Interval 232 ms QRS Duration 72 ms Q-T Interval 374 ms QTC Calculation (Bezet) 397 ms Calculated P Axis 64 degrees Calculated R Axis -16 degrees Calculated T Axis -6 degrees Diagnosis ** Suspect unspecified pacemaker failure Sinus rhythm with sinus arrhythmia with 1st degree AV block with frequent  
ventricular-paced complexes Possible Anterior infarct , age undetermined When compared with ECG of 07-MAR-2018 14:50, 
IL interval has increased Vent. rate has increased BY   7 BPM 
  
CBC WITH AUTOMATED DIFF Collection Time: 10/27/18  3:41 PM  
Result Value Ref Range WBC 9.0 3.6 - 11.0 K/uL  
 RBC 3.85 3.80 - 5.20 M/uL  
 HGB 12.2 11.5 - 16.0 g/dL HCT 37.6 35.0 - 47.0 % MCV 97.7 80.0 - 99.0 FL  
 MCH 31.7 26.0 - 34.0 PG  
 MCHC 32.4 30.0 - 36.5 g/dL  
 RDW 13.2 11.5 - 14.5 % PLATELET 986 127 - 111 K/uL MPV 9.1 8.9 - 12.9 FL  
 NRBC 0.0 0  WBC ABSOLUTE NRBC 0.00 0.00 - 0.01 K/uL NEUTROPHILS 67 32 - 75 % LYMPHOCYTES 21 12 - 49 % MONOCYTES 7 5 - 13 % EOSINOPHILS 3 0 - 7 % BASOPHILS 0 0 - 1 % IMMATURE GRANULOCYTES 2 (H) 0.0 - 0.5 % ABS. NEUTROPHILS 6.1 1.8 - 8.0 K/UL  
 ABS. LYMPHOCYTES 1.9 0.8 - 3.5 K/UL  
 ABS. MONOCYTES 0.6 0.0 - 1.0 K/UL  
 ABS. EOSINOPHILS 0.3 0.0 - 0.4 K/UL  
 ABS. BASOPHILS 0.0 0.0 - 0.1 K/UL  
 ABS. IMM. GRANS. 0.1 (H) 0.00 - 0.04 K/UL  
 DF AUTOMATED METABOLIC PANEL, COMPREHENSIVE Collection Time: 10/27/18  3:41 PM  
Result Value Ref Range Sodium 139 136 - 145 mmol/L Potassium 4.0 3.5 - 5.1 mmol/L Chloride 104 97 - 108 mmol/L  
 CO2 28 21 - 32 mmol/L Anion gap 7 5 - 15 mmol/L Glucose 173 (H) 65 - 100 mg/dL BUN 29 (H) 6 - 20 MG/DL Creatinine 1.19 (H) 0.55 - 1.02 MG/DL  
 BUN/Creatinine ratio 24 (H) 12 - 20 GFR est AA 52 (L) >60 ml/min/1.73m2 GFR est non-AA 43 (L) >60 ml/min/1.73m2 Calcium 10.5 (H) 8.5 - 10.1 MG/DL Bilirubin, total 0.5 0.2 - 1.0 MG/DL  
 ALT (SGPT) 12 12 - 78 U/L  
 AST (SGOT) 15 15 - 37 U/L Alk. phosphatase 94 45 - 117 U/L Protein, total 7.6 6.4 - 8.2 g/dL Albumin 3.1 (L) 3.5 - 5.0 g/dL Globulin 4.5 (H) 2.0 - 4.0 g/dL A-G Ratio 0.7 (L) 1.1 - 2.2 NT-PRO BNP Collection Time: 10/27/18  3:41 PM  
Result Value Ref Range NT pro- 0 - 450 PG/ML  
TROPONIN I Collection Time: 10/27/18  3:41 PM  
Result Value Ref Range Troponin-I, Qt. 0.10 (H) <0.05 ng/mL URINALYSIS W/ RFLX MICROSCOPIC Collection Time: 10/27/18  4:47 PM  
Result Value Ref Range Color YELLOW/STRAW  Appearance TURBID (A) CLEAR    
 Specific gravity 1.018 1.003 - 1.030    
 pH (UA) 8.5 (H) 5.0 - 8.0 Protein 100 (A) NEG mg/dL Glucose NEGATIVE  NEG mg/dL Ketone NEGATIVE  NEG mg/dL Bilirubin NEGATIVE  NEG Blood NEGATIVE  NEG Urobilinogen 1.0 0.2 - 1.0 EU/dL Nitrites NEGATIVE  NEG Leukocyte Esterase LARGE (A) NEG    
 WBC 10-20 0 - 4 /hpf  
 RBC 0-5 0 - 5 /hpf Epithelial cells MODERATE (A) FEW /lpf Bacteria 4+ (A) NEG /hpf Mucus TRACE (A) NEG /lpf Triple Phosphate crystals 3+ (A) NEG Hyaline cast 5-10 0 - 5 /lpf Medical Decision Making I am the first provider for this patient. I reviewed the vital signs, available nursing notes, past medical history, past surgical history, family history and social history. Vital Signs-Reviewed the patient's vital signs. Patient Vitals for the past 12 hrs: 
 Temp Pulse Resp BP SpO2  
10/27/18 1845  72 23 121/74 96 % 10/27/18 1830  66 20 123/73 96 % 10/27/18 1815  68 18 133/66 95 % 10/27/18 1800  68 19 126/82 93 % 10/27/18 1730  74 19 136/76 93 % 10/27/18 1715  71 19 134/66 94 % 10/27/18 1700  91 19 132/77 95 % 10/27/18 1411 97.7 °F (36.5 °C) 77 16 137/82 100 % Pulse Oximetry Analysis - 100% on RA Cardiac Monitor:  
Rate: 77 bpm 
Rhythm: Sinus Rhythm EKG interpretation: (Preliminary) [address] Rhythm: sinus rhythm with sinus arrhythmia with 1st degree AV block with frequent ventricular complexes; and regular . Rate (approx.): 68; Axis: normal; TN interval: 232; QRS interval: 72; ST/T wave: normal; Other findings: Q waves sin III, aVF, and V1. Written by YAHIR Machado, as dictated by Christiano Goff MD. Records Reviewed: Nursing Notes, Old Medical Records, Previous electrocardiograms, Previous Radiology Studies and Previous Laboratory Studies Provider Notes (Medical Decision Making):  
Generalized fatigue without specific sx's in a patient with severe heart disease. Decreased oral intake and decreased appetite with generalized weakness. Possible infection, metabolic derangement, failure to thrive ED Course:  
Initial assessment performed. The patients presenting problems have been discussed, and they are in agreement with the care plan formulated and outlined with them. I have encouraged them to ask questions as they arise throughout their visit. 4:56 PM 
Per chart review, pt's troponin is chronically elevated at 0.1-0. 15. Discussed results with patient and family. Small fluid bolus to be given while awaiting urinalysis. Written by YAHIR Cifuentesibchristiano, as dictated by Viviane Elizondo MD 
 
6pm Discussed home care and urinary infection as well as fluids given here in ED with hx of CHF. Daughter concerned about her hand and feet pain and pts inability to walk. Recommend recheck with PCP in 2-3 days as hopefully will improve with treatment of her infection. Try short course prednisone for worsening RA pain. Critical Care Time: 0 Disposition: 
DISCHARGE NOTE: 
6:00 PM 
The patient is ready for discharge. The patients signs, symptoms, diagnosis, and instructions for discharge have been discussed and the pt has conveyed their understanding. The patient is to follow up as recommended with PCP or return to the ER should their symptoms worsen. Plan has been discussed and patient has conveyed their agreement. PLAN: 
1. Discharge home Discharge Medication List as of 10/27/2018  6:00 PM  
  
CONTINUE these medications which have NOT CHANGED Details  
morphine sulfate/PF (MORPHINE, PF,) soln by Intrathecal route continuous.  CREAM APPLIED DAILY FOR FOOT PAIN, Historical Med  
  
!! BRILINTA 60 mg tab tablet take 1 tablet by mouth twice a day, Normal, Disp-60 Tab, R-11  
  
isosorbide mononitrate ER (IMDUR) 30 mg tablet take 1 tablet by mouth once daily TO HELP PREVENT CHEST PAIN, Normal, Disp-90 Tab, R-3  
  
 Cetirizine (ZYRTEC) 10 mg cap Take  by mouth daily. , Historical Med  
  
nitroglycerin (NITROSTAT) 0.4 mg SL tablet 1 Tab by SubLINGual route every five (5) minutes as needed (call 911 if not relieved by 3). , Normal, Disp-25 Tab, R-1  
  
!! ticagrelor (BRILINTA) 60 mg tab tablet Take 1 Tab by mouth two (2) times a day., Normal, Disp-180 Tab, R-0  
  
doxazosin (CARDURA) 2 mg tablet Take 2 mg by mouth daily. , Historical Med, R-1  
  
magnesium hydroxide (ISIDRO MILK OF MAGNESIA) 400 mg/5 mL suspension Take 30 mL by mouth daily as needed for Constipation. , Historical Med  
  
gabapentin (NEURONTIN) 100 mg capsule Take 2 Caps by mouth three (3) times daily. , Print, Disp-180 Cap, R-1  
  
metoprolol tartrate (LOPRESSOR) 25 mg tablet Take 0.5 Tabs by mouth two (2) times a day., Print, Disp-60 Tab, R-1  
  
bumetanide (BUMEX) 1 mg tablet Take 1 Tab by mouth daily. , Print, Disp-30 Tab, R-1  
  
magnesium oxide (MAG-OX) 400 mg tablet Take 1 Tab by mouth daily. , Print, Disp-30 Tab, R-1  
  
pantoprazole (PROTONIX) 40 mg tablet Take 1 Tab by mouth Daily (before breakfast). , Print, Disp-30 Tab, R-1  
  
traMADol (ULTRAM) 50 mg tablet Take 1 Tab by mouth every six (6) hours as needed for Pain. Max Daily Amount: 200 mg., Print, Disp-40 Tab, R-0  
  
ferrous sulfate 325 mg (65 mg iron) tablet Take 1 Tab by mouth two (2) times daily (with meals). , Print, Disp-60 Tab, R-1  
  
acetaminophen (TYLENOL) 325 mg tablet Take 1,300 mg by mouth nightly., Historical Med  
  
dextran 70-hypromellose (ARTIFICIAL TEARS) ophthalmic solution Administer 1 Drop to both eyes as needed., Historical Med  
  
lidocaine 5 % topical cream Apply  to affected area two (2) times daily as needed for Itching., Historical Med  
  
fluticasone (FLONASE) 50 mcg/actuation nasal spray 2 Sprays by Both Nostrils route daily. , Historical Med  
  
allopurinol (ZYLOPRIM) 100 mg tablet Take  by mouth daily. , Historical Med  
  
 aspirin delayed-release 81 mg tablet Take 81 mg by mouth daily. , Historical Med  
  
albuterol (PROAIR HFA) 90 mcg/actuation inhaler Take 2 Puffs by inhalation every six (6) hours as needed for Wheezing. 2 PUFFS 4 TIMES DAILY, Historical Med  
  
pravastatin (PRAVACHOL) 20 mg tablet Take 1 Tab by mouth nightly. , Print, Disp-30 Tab, R-11  
  
latanoprost (XALATAN) 0.005 % ophthalmic solution Administer 1 Drop to both eyes nightly., Historical Med  
  
 !! - Potential duplicate medications found. Please discuss with provider. 2.  
Follow-up Information Follow up With Specialties Details Why Contact Info [facility] EMERGENCY DEPT Emergency Medicine  If symptoms worsen [address] [place] [address] [address] [facility] 
[phone] Uri Tenorio NP [facility]  for rehceck this week [address] [phone] Return to ED if worse Diagnosis Clinical Impression: 1. Acute cystitis without hematuria 2. Malaise and fatigue Attestations: This note is prepared by Omari Langston, acting as Scribe for Evelyn Figueroa MD. Evelyn Figueroa MD: The scribe's documentation has been prepared under my direction and personally reviewed by me in its entirety. I confirm that the note above accurately reflects all work, treatment, procedures, and medical decision making performed by me. This note will not be viewable in [address]

## 2018-10-27 NOTE — ED NOTES
Discharge instructions reviewed with patient. Discharge instructions given to patient per Dr. Jake Perdomo. Patient able to return/verbalize discharge instructions. Copy of discharge instructions provided. Patient condition stable, respiratory status within normal limits, neuro status intact. Wheeled out of ER, accompanied by family.

## 2018-10-27 NOTE — DISCHARGE INSTRUCTIONS
Fatigue: Care Instructions  Your Care Instructions  Fatigue is a feeling of tiredness, exhaustion, or lack of energy. You may feel fatigue because of too much or not enough activity. It can also come from stress, lack of sleep, boredom, and poor diet. Many medical problems, such as viral infections, can cause fatigue. Emotional problems, especially depression, are often the cause of fatigue. Fatigue is most often a symptom of another problem. Treatment for fatigue depends on the cause. For example, if you have fatigue because you have a certain health problem, treating this problem also treats your fatigue. If depression or anxiety is the cause, treatment may help. Follow-up care is a key part of your treatment and safety. Be sure to make and go to all appointments, and call your doctor if you are having problems. It's also a good idea to know your test results and keep a list of the medicines you take. How can you care for yourself at home? · Get regular exercise. But don't overdo it. Go back and forth between rest and exercise. · Get plenty of rest.  · Eat a healthy diet. Do not skip meals, especially breakfast.  · Reduce your use of caffeine, tobacco, and alcohol. Caffeine is most often found in coffee, tea, cola drinks, and chocolate. · Limit medicines that can cause fatigue. This includes tranquilizers and cold and allergy medicines. When should you call for help? Watch closely for changes in your health, and be sure to contact your doctor if:    · You have new symptoms such as fever or a rash.     · Your fatigue gets worse.     · You have been feeling down, depressed, or hopeless. Or you may have lost interest in things that you usually enjoy.     · You are not getting better as expected. Where can you learn more? Go to http://nany-ariella.info/. Enter P746 in the search box to learn more about \"Fatigue: Care Instructions. \"  Current as of: November 20, 2017  Content Version: 11.8  © 5457-2376 [employer]. Care instructions adapted under license by [employer] (which disclaims liability or warranty for this information). If you have questions about a medical condition or this instruction, always ask your healthcare professional. [address] any warranty or liability for your use of this information. Urinary Tract Infection in Women: Care Instructions  Your Care Instructions  A urinary tract infection, or UTI, is a general term for an infection anywhere between the kidneys and the urethra (where urine comes out). Most UTIs are bladder infections. They often cause pain or burning when you urinate. UTIs are caused by bacteria and can be cured with antibiotics. Be sure to complete your treatment so that the infection goes away. Follow-up care is a key part of your treatment and safety. Be sure to make and go to all appointments, and call your doctor if you are having problems. It's also a good idea to know your test results and keep a list of the medicines you take. How can you care for yourself at home? · Take your antibiotics as directed. Do not stop taking them just because you feel better. You need to take the full course of antibiotics. · Drink extra water and other fluids for the next day or two. This may help wash out the bacteria that are causing the infection. (If you have kidney, heart, or liver disease and have to limit fluids, talk with your doctor before you increase your fluid intake.)  · Avoid drinks that are carbonated or have caffeine. They can irritate the bladder. · Urinate often. Try to empty your bladder each time. · To relieve pain, take a hot bath or lay a heating pad set on low over your lower belly or genital area. Never go to sleep with a heating pad in place. To prevent UTIs  · Drink plenty of water each day. This helps you urinate often, which clears bacteria from your system.  (If you have kidney, heart, or liver disease and have to limit fluids, talk with your doctor before you increase your fluid intake.)  · Urinate when you need to. · Urinate right after you have sex. · Change sanitary pads often. · Avoid douches, bubble baths, feminine hygiene sprays, and other feminine hygiene products that have deodorants. · After going to the bathroom, wipe from front to back. When should you call for help? Call your doctor now or seek immediate medical care if:    · Symptoms such as fever, chills, nausea, or vomiting get worse or appear for the first time.     · You have new pain in your back just below your rib cage. This is called flank pain.     · There is new blood or pus in your urine.     · You have any problems with your antibiotic medicine.    Watch closely for changes in your health, and be sure to contact your doctor if:    · You are not getting better after taking an antibiotic for 2 days.     · Your symptoms go away but then come back. Where can you learn more? Go to http://nany-ariella.info/. Enter G620 in the search box to learn more about \"Urinary Tract Infection in Women: Care Instructions. \"  Current as of: March 21, 2018  Content Version: 11.8  © 0513-7121 [employer], [employer]. Care instructions adapted under license by [employer] (which disclaims liability or warranty for this information). If you have questions about a medical condition or this instruction, always ask your healthcare professional. [place] [address] any warranty or liability for your use of this information.

## 2018-10-28 LAB
ATRIAL RATE: 78 BPM
CALCULATED P AXIS, ECG09: 64 DEGREES
CALCULATED R AXIS, ECG10: -16 DEGREES
CALCULATED T AXIS, ECG11: -6 DEGREES
DIAGNOSIS, 93000: NORMAL
P-R INTERVAL, ECG05: 232 MS
Q-T INTERVAL, ECG07: 374 MS
QRS DURATION, ECG06: 72 MS
QTC CALCULATION (BEZET), ECG08: 397 MS
VENTRICULAR RATE, ECG03: 68 BPM

## 2018-11-05 ENCOUNTER — CLINICAL SUPPORT (OUTPATIENT)
Dept: CARDIOLOGY CLINIC | Age: 83
End: 2018-11-05

## 2018-11-05 DIAGNOSIS — Z95.0 CARDIAC PACEMAKER IN SITU: Primary | ICD-10-CM

## 2018-11-13 ENCOUNTER — APPOINTMENT (OUTPATIENT)
Dept: CT IMAGING | Age: 83
End: 2018-11-13
Attending: EMERGENCY MEDICINE
Payer: MEDICARE

## 2018-11-13 ENCOUNTER — APPOINTMENT (OUTPATIENT)
Dept: ULTRASOUND IMAGING | Age: 83
End: 2018-11-13
Attending: EMERGENCY MEDICINE
Payer: MEDICARE

## 2018-11-13 ENCOUNTER — HOSPITAL ENCOUNTER (EMERGENCY)
Age: 83
Discharge: HOME | End: 2018-11-13
Attending: EMERGENCY MEDICINE
Payer: MEDICARE

## 2018-11-13 ENCOUNTER — APPOINTMENT (OUTPATIENT)
Dept: GENERAL RADIOLOGY | Age: 83
End: 2018-11-13
Attending: EMERGENCY MEDICINE
Payer: MEDICARE

## 2018-11-13 VITALS
DIASTOLIC BLOOD PRESSURE: 60 MMHG | WEIGHT: 162 LBS | BODY MASS INDEX: 36.44 KG/M2 | RESPIRATION RATE: 23 BRPM | TEMPERATURE: 98.5 F | HEIGHT: 56 IN | SYSTOLIC BLOOD PRESSURE: 122 MMHG | HEART RATE: 82 BPM | OXYGEN SATURATION: 97 %

## 2018-11-13 DIAGNOSIS — M71.22 BAKER'S CYST OF KNEE, LEFT: Primary | ICD-10-CM

## 2018-11-13 DIAGNOSIS — R07.89 ATYPICAL CHEST PAIN: ICD-10-CM

## 2018-11-13 DIAGNOSIS — K57.30 DIVERTICULOSIS OF COLON: ICD-10-CM

## 2018-11-13 DIAGNOSIS — M71.21 BAKER'S CYST OF KNEE, RIGHT: ICD-10-CM

## 2018-11-13 DIAGNOSIS — K59.00 CONSTIPATION, UNSPECIFIED CONSTIPATION TYPE: ICD-10-CM

## 2018-11-13 DIAGNOSIS — M19.049 HAND ARTHRITIS: ICD-10-CM

## 2018-11-13 LAB
ALBUMIN SERPL-MCNC: 3.1 G/DL (ref 3.5–5)
ALBUMIN/GLOB SERPL: 0.6 {RATIO} (ref 1.1–2.2)
ALP SERPL-CCNC: 103 U/L (ref 45–117)
ALT SERPL-CCNC: 14 U/L (ref 12–78)
ANION GAP SERPL CALC-SCNC: 6 MMOL/L (ref 5–15)
APPEARANCE UR: CLEAR
AST SERPL-CCNC: 12 U/L (ref 15–37)
ATRIAL RATE: 69 BPM
BASOPHILS # BLD: 0 K/UL (ref 0–0.1)
BASOPHILS NFR BLD: 0 % (ref 0–1)
BILIRUB SERPL-MCNC: 0.6 MG/DL (ref 0.2–1)
BILIRUB UR QL: NEGATIVE
BUN SERPL-MCNC: 22 MG/DL (ref 6–20)
BUN/CREAT SERPL: 19 (ref 12–20)
CALCIUM SERPL-MCNC: 10.6 MG/DL (ref 8.5–10.1)
CALCULATED R AXIS, ECG10: -18 DEGREES
CALCULATED T AXIS, ECG11: -21 DEGREES
CHLORIDE SERPL-SCNC: 102 MMOL/L (ref 97–108)
CK SERPL-CCNC: 23 U/L (ref 26–192)
CO2 SERPL-SCNC: 28 MMOL/L (ref 21–32)
COLOR UR: ABNORMAL
COMMENT, HOLDF: NORMAL
CREAT SERPL-MCNC: 1.17 MG/DL (ref 0.55–1.02)
D DIMER PPP FEU-MCNC: 1.38 MG/L FEU (ref 0–0.65)
DIAGNOSIS, 93000: NORMAL
DIFFERENTIAL METHOD BLD: ABNORMAL
EOSINOPHIL # BLD: 0.3 K/UL (ref 0–0.4)
EOSINOPHIL NFR BLD: 3 % (ref 0–7)
ERYTHROCYTE [DISTWIDTH] IN BLOOD BY AUTOMATED COUNT: 13.2 % (ref 11.5–14.5)
GLOBULIN SER CALC-MCNC: 4.8 G/DL (ref 2–4)
GLUCOSE SERPL-MCNC: 172 MG/DL (ref 65–100)
GLUCOSE UR STRIP.AUTO-MCNC: NEGATIVE MG/DL
HCT VFR BLD AUTO: 38.1 % (ref 35–47)
HGB BLD-MCNC: 12.3 G/DL (ref 11.5–16)
HGB UR QL STRIP: NEGATIVE
IMM GRANULOCYTES # BLD: 0.1 K/UL (ref 0–0.04)
IMM GRANULOCYTES NFR BLD AUTO: 1 % (ref 0–0.5)
KETONES UR QL STRIP.AUTO: ABNORMAL MG/DL
LACTATE SERPL-SCNC: 1 MMOL/L (ref 0.4–2)
LEUKOCYTE ESTERASE UR QL STRIP.AUTO: NEGATIVE
LIPASE SERPL-CCNC: 79 U/L (ref 73–393)
LYMPHOCYTES # BLD: 1.7 K/UL (ref 0.8–3.5)
LYMPHOCYTES NFR BLD: 18 % (ref 12–49)
MCH RBC QN AUTO: 31.5 PG (ref 26–34)
MCHC RBC AUTO-ENTMCNC: 32.3 G/DL (ref 30–36.5)
MCV RBC AUTO: 97.7 FL (ref 80–99)
MONOCYTES # BLD: 0.6 K/UL (ref 0–1)
MONOCYTES NFR BLD: 7 % (ref 5–13)
NEUTS SEG # BLD: 6.6 K/UL (ref 1.8–8)
NEUTS SEG NFR BLD: 71 % (ref 32–75)
NITRITE UR QL STRIP.AUTO: NEGATIVE
NRBC # BLD: 0 K/UL (ref 0–0.01)
NRBC BLD-RTO: 0 PER 100 WBC
P-R INTERVAL, ECG05: 224 MS
PH UR STRIP: 7.5 [PH] (ref 5–8)
PLATELET # BLD AUTO: 237 K/UL (ref 150–400)
PMV BLD AUTO: 9.4 FL (ref 8.9–12.9)
POTASSIUM SERPL-SCNC: 3.7 MMOL/L (ref 3.5–5.1)
PROT SERPL-MCNC: 7.9 G/DL (ref 6.4–8.2)
PROT UR STRIP-MCNC: NEGATIVE MG/DL
Q-T INTERVAL, ECG07: 352 MS
QRS DURATION, ECG06: 72 MS
QTC CALCULATION (BEZET), ECG08: 377 MS
RBC # BLD AUTO: 3.9 M/UL (ref 3.8–5.2)
SAMPLES BEING HELD,HOLD: NORMAL
SODIUM SERPL-SCNC: 136 MMOL/L (ref 136–145)
SP GR UR REFRACTOMETRY: >1.03 (ref 1–1.03)
TROPONIN I SERPL-MCNC: 0.08 NG/ML
TROPONIN I SERPL-MCNC: 0.08 NG/ML
UROBILINOGEN UR QL STRIP.AUTO: 1 EU/DL (ref 0.2–1)
VENTRICULAR RATE, ECG03: 69 BPM
WBC # BLD AUTO: 9.4 K/UL (ref 3.6–11)

## 2018-11-13 PROCEDURE — 73130 X-RAY EXAM OF HAND: CPT

## 2018-11-13 PROCEDURE — 74011250636 HC RX REV CODE- 250/636: Performed by: EMERGENCY MEDICINE

## 2018-11-13 PROCEDURE — 84484 ASSAY OF TROPONIN QUANT: CPT

## 2018-11-13 PROCEDURE — 74011636320 HC RX REV CODE- 636/320: Performed by: EMERGENCY MEDICINE

## 2018-11-13 PROCEDURE — 74177 CT ABD & PELVIS W/CONTRAST: CPT

## 2018-11-13 PROCEDURE — 85025 COMPLETE CBC W/AUTO DIFF WBC: CPT

## 2018-11-13 PROCEDURE — 73620 X-RAY EXAM OF FOOT: CPT

## 2018-11-13 PROCEDURE — 93970 EXTREMITY STUDY: CPT

## 2018-11-13 PROCEDURE — 83690 ASSAY OF LIPASE: CPT

## 2018-11-13 PROCEDURE — 83605 ASSAY OF LACTIC ACID: CPT

## 2018-11-13 PROCEDURE — 93005 ELECTROCARDIOGRAM TRACING: CPT

## 2018-11-13 PROCEDURE — 71275 CT ANGIOGRAPHY CHEST: CPT

## 2018-11-13 PROCEDURE — 36415 COLL VENOUS BLD VENIPUNCTURE: CPT

## 2018-11-13 PROCEDURE — 96374 THER/PROPH/DIAG INJ IV PUSH: CPT

## 2018-11-13 PROCEDURE — 81003 URINALYSIS AUTO W/O SCOPE: CPT

## 2018-11-13 PROCEDURE — 99284 EMERGENCY DEPT VISIT MOD MDM: CPT

## 2018-11-13 PROCEDURE — 96375 TX/PRO/DX INJ NEW DRUG ADDON: CPT

## 2018-11-13 PROCEDURE — 85379 FIBRIN DEGRADATION QUANT: CPT

## 2018-11-13 PROCEDURE — 80053 COMPREHEN METABOLIC PANEL: CPT

## 2018-11-13 PROCEDURE — 82550 ASSAY OF CK (CPK): CPT

## 2018-11-13 PROCEDURE — 71046 X-RAY EXAM CHEST 2 VIEWS: CPT

## 2018-11-13 RX ORDER — ONDANSETRON 2 MG/ML
4 INJECTION INTRAMUSCULAR; INTRAVENOUS
Status: COMPLETED | OUTPATIENT
Start: 2018-11-13 | End: 2018-11-13

## 2018-11-13 RX ORDER — SODIUM CHLORIDE 0.9 % (FLUSH) 0.9 %
10 SYRINGE (ML) INJECTION
Status: COMPLETED | OUTPATIENT
Start: 2018-11-13 | End: 2018-11-13

## 2018-11-13 RX ORDER — MORPHINE SULFATE 2 MG/ML
2 INJECTION, SOLUTION INTRAMUSCULAR; INTRAVENOUS
Status: DISCONTINUED | OUTPATIENT
Start: 2018-11-13 | End: 2018-11-14 | Stop reason: HOSPADM

## 2018-11-13 RX ORDER — NYSTATIN 100000 U/G
OINTMENT TOPICAL 2 TIMES DAILY
COMMUNITY
End: 2020-01-01

## 2018-11-13 RX ORDER — METHOCARBAMOL 750 MG/1
750 TABLET, FILM COATED ORAL
Qty: 20 TAB | Refills: 0 | Status: SHIPPED | OUTPATIENT
Start: 2018-11-13 | End: 2019-07-25

## 2018-11-13 RX ORDER — SODIUM CHLORIDE 9 MG/ML
50 INJECTION, SOLUTION INTRAVENOUS
Status: COMPLETED | OUTPATIENT
Start: 2018-11-13 | End: 2018-11-13

## 2018-11-13 RX ORDER — TRIAMCINOLONE ACETONIDE 5 MG/G
OINTMENT TOPICAL 2 TIMES DAILY
COMMUNITY

## 2018-11-13 RX ORDER — FENTANYL CITRATE 50 UG/ML
25 INJECTION, SOLUTION INTRAMUSCULAR; INTRAVENOUS ONCE
Status: COMPLETED | OUTPATIENT
Start: 2018-11-13 | End: 2018-11-13

## 2018-11-13 RX ORDER — POLYETHYLENE GLYCOL 3350 17 G/17G
17 POWDER, FOR SOLUTION ORAL
Qty: 119 G | Refills: 0 | Status: SHIPPED | OUTPATIENT
Start: 2018-11-13

## 2018-11-13 RX ADMIN — SODIUM CHLORIDE 50 ML/HR: 900 INJECTION, SOLUTION INTRAVENOUS at 19:58

## 2018-11-13 RX ADMIN — FENTANYL CITRATE 25 MCG: 50 INJECTION, SOLUTION INTRAMUSCULAR; INTRAVENOUS at 18:23

## 2018-11-13 RX ADMIN — IOPAMIDOL 100 ML: 755 INJECTION, SOLUTION INTRAVENOUS at 20:54

## 2018-11-13 RX ADMIN — IOPAMIDOL 100 ML: 755 INJECTION, SOLUTION INTRAVENOUS at 19:58

## 2018-11-13 RX ADMIN — DIATRIZOATE MEGLUMINE AND DIATRIZOATE SODIUM 30 ML: 660; 100 LIQUID ORAL; RECTAL at 18:30

## 2018-11-13 RX ADMIN — SODIUM CHLORIDE 50 ML/HR: 900 INJECTION, SOLUTION INTRAVENOUS at 20:54

## 2018-11-13 RX ADMIN — ONDANSETRON 4 MG: 2 INJECTION INTRAMUSCULAR; INTRAVENOUS at 18:25

## 2018-11-13 RX ADMIN — Medication 10 ML: at 20:54

## 2018-11-13 RX ADMIN — Medication 10 ML: at 19:58

## 2018-11-13 NOTE — ED PROVIDER NOTES
[facility] PROGRESS NOTE    PATIENT IDENTIFICATION:   Name:  Ángel Rutledge      MRN:  [id number]     EMERGENCY DEPARTMENT HISTORY AND PHYSICAL EXAM 
 
 
Date: 11/13/2018 Patient Name: Mickey Pinto History of Presenting Illness Chief Complaint Patient presents with  Ankle swelling  
  pts family reports pt has bilateral foot swelling, hands swelling x1 week, shortness of breath off and on  
 Hand Swelling  Shortness of Breath History Provided By: Patient HPI: Mickey Pinto, 80 y.o. female with PMHx significant for CAD, HTN, DM and asthma, presents ambulatory to the ED with cc of gradual onset, constant, worsening B/L ankle and hand swelling x 1 week with associated 10/10 generalized body aches, B/L hand and foot pain, mild intensity, diffuse abd pain, SOB at baseline and chills. Pt's pain started this morning upon waking and prompted the trip to the ED for evaluation. Her swelling has been going on for a week and has worsened with time Hand and foot pain is worsened with palpation. Pt reports a history of a cholecystectomy. She denies smoking or drinking. Pt specifically denies nausea, vomiting, a fever or CP. There are no other complaints, changes, or physical findings at this time. PCP: Mg Nobles NP Current Facility-Administered Medications Medication Dose Route Frequency Provider Last Rate Last Dose  morphine injection 2 mg  2 mg IntraVENous NOW Darcy Gresham MD   Stopped at 11/13/18 1998 Current Outpatient Medications Medication Sig Dispense Refill  nystatin (MYCOSTATIN) 100,000 unit/gram ointment Apply  to affected area two (2) times a day.  triamcinolone acetonide (KENALOG) 0.5 % ointment Apply  to affected area two (2) times a day. use thin layer  polyethylene glycol (MIRALAX) 17 gram/dose powder Take 17 g by mouth daily as needed. 1 tablespoon with 8 oz of water daily 119 g 0  
 methocarbamol (ROBAXIN-750) 750 mg tablet Take 1 Tab by mouth four (4) times daily as needed.  20 Tab 0  
 morphine sulfate/PF (MORPHINE, PF,) soln by Intrathecal route continuous. CREAM APPLIED DAILY FOR FOOT PAIN    
 BRILINTA 60 mg tab tablet take 1 tablet by mouth twice a day 60 Tab 11  
 isosorbide mononitrate ER (IMDUR) 30 mg tablet take 1 tablet by mouth once daily TO HELP PREVENT CHEST PAIN 90 Tab 3  
 Cetirizine (ZYRTEC) 10 mg cap Take  by mouth daily.  nitroglycerin (NITROSTAT) 0.4 mg SL tablet 1 Tab by SubLINGual route every five (5) minutes as needed (call 911 if not relieved by 3). 25 Tab 1  
 ticagrelor (BRILINTA) 60 mg tab tablet Take 1 Tab by mouth two (2) times a day. 180 Tab 0  
 doxazosin (CARDURA) 2 mg tablet Take 2 mg by mouth daily. 1  
 magnesium hydroxide (ISIDRO MILK OF MAGNESIA) 400 mg/5 mL suspension Take 30 mL by mouth daily as needed for Constipation.  gabapentin (NEURONTIN) 100 mg capsule Take 2 Caps by mouth three (3) times daily. 180 Cap 1  
 metoprolol tartrate (LOPRESSOR) 25 mg tablet Take 0.5 Tabs by mouth two (2) times a day. 60 Tab 1  
 bumetanide (BUMEX) 1 mg tablet Take 1 Tab by mouth daily. 30 Tab 1  
 magnesium oxide (MAG-OX) 400 mg tablet Take 1 Tab by mouth daily. 30 Tab 1  
 pantoprazole (PROTONIX) 40 mg tablet Take 1 Tab by mouth Daily (before breakfast). 30 Tab 1  
 traMADol (ULTRAM) 50 mg tablet Take 1 Tab by mouth every six (6) hours as needed for Pain. Max Daily Amount: 200 mg. 40 Tab 0  
 ferrous sulfate 325 mg (65 mg iron) tablet Take 1 Tab by mouth two (2) times daily (with meals). 60 Tab 1  
 acetaminophen (TYLENOL) 325 mg tablet Take 1,300 mg by mouth nightly.  dextran 70-hypromellose (ARTIFICIAL TEARS) ophthalmic solution Administer 1 Drop to both eyes as needed.  lidocaine 5 % topical cream Apply  to affected area two (2) times daily as needed for Itching.  fluticasone (FLONASE) 50 mcg/actuation nasal spray 2 Sprays by Both Nostrils route daily.  allopurinol (ZYLOPRIM) 100 mg tablet Take  by mouth daily. Bar,   aspirin delayed-release 81 mg tablet Take 81 mg by mouth daily.  albuterol (PROAIR HFA) 90 mcg/actuation inhaler Take 2 Puffs by inhalation every six (6) hours as needed for Wheezing. 2 PUFFS 4 TIMES DAILY  pravastatin (PRAVACHOL) 20 mg tablet Take 1 Tab by mouth nightly. 30 Tab 11  
 latanoprost (XALATAN) 0.005 % ophthalmic solution Administer 1 Drop to both eyes nightly. Past History Past Medical History: 
Past Medical History:  
Diagnosis Date  Asthma  CAD (coronary artery disease)  Diabetes ([facility] [address]  Hypertension  Irregular heart rate 4/25/13  Morbid obesity ([facility] [address]  Other ill-defined conditions(799.89)   
 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 
Past Surgical History:  
Procedure Laterality Date  HX CHOLECYSTECTOMY  HX CORONARY STENT PLACEMENT    
 HX HEART CATHETERIZATION    
 HX PACEMAKER    
 HX PACEMAKER PLACEMENT Left Family History: 
History reviewed. No pertinent family history. Social History: 
Social History Tobacco Use  Smoking status: Never Smoker  Smokeless tobacco: Never Used Substance Use Topics  Alcohol use: No  
 Drug use: No  
 
 
Allergies: Allergies Allergen Reactions  Pcn [Penicillins] Unknown (comments) Review of Systems Review of Systems Constitutional: Negative for fever. HENT: Negative for congestion. Eyes: Negative. Respiratory: Positive for shortness of breath. Cardiovascular: Negative for chest pain. Gastrointestinal: Positive for abdominal pain. Negative for nausea and vomiting. Endocrine: Negative for heat intolerance. Genitourinary: Negative for dysuria. Musculoskeletal: Positive for arthralgias, joint swelling and myalgias. Skin: Negative for rash. Allergic/Immunologic: Negative for immunocompromised state. Neurological: Negative for dizziness. Hematological: Does not bruise/bleed easily. Psychiatric/Behavioral: Negative. All other systems reviewed and are negative. Physical Exam  
Physical Exam  
Constitutional: She appears well-developed and well-nourished. No distress. HENT:  
Head: Normocephalic. Eyes: EOM are normal. Pupils are equal, round, and reactive to light. Neck: Normal range of motion. Neck supple. Cardiovascular: Normal rate, regular rhythm, normal heart sounds and intact distal pulses. Pulmonary/Chest: Effort normal and breath sounds normal.  
Abdominal: Soft. Bowel sounds are normal. There is tenderness (mild. diffuse). Musculoskeletal: Normal range of motion. She exhibits edema (1+ to the B/L hands, 3+ to the right foot, 2+ to the left foot, 2+ to the B/L LEs and calves) and tenderness (B/L, diffuse hand tenderness to palpation. B/L tenderness to palpation of the calves). Neurological: She is alert. Sensory intact Skin: Skin is warm and dry. Psychiatric: She has a normal mood and affect. Her behavior is normal.  
Nursing note and vitals reviewed. Diagnostic Study Results Labs - Recent Results (from the past 12 hour(s)) EKG, 12 LEAD, INITIAL Collection Time: 11/13/18  1:25 PM  
Result Value Ref Range Ventricular Rate 69 BPM  
 Atrial Rate 69 BPM  
 P-R Interval 224 ms QRS Duration 72 ms Q-T Interval 352 ms QTC Calculation (Bezet) 377 ms Calculated R Axis -18 degrees Calculated T Axis -21 degrees Diagnosis AV dual-paced rhythm with prolonged AV conduction 
with intermittent ventricular pacing Confirmed by Sisi Stoner ([id number]) on 11/13/2018 3:41:42 PM 
  
CBC WITH AUTOMATED DIFF Collection Time: 11/13/18  1:37 PM  
Result Value Ref Range WBC 9.4 3.6 - 11.0 K/uL  
 RBC 3.90 3.80 - 5.20 M/uL  
 HGB 12.3 11.5 - 16.0 g/dL HCT 38.1 35.0 - 47.0 % MCV 97.7 80.0 - 99.0 FL  
 MCH 31.5 26.0 - 34.0 PG  
 MCHC 32.3 30.0 - 36.5 g/dL  
 RDW 13.2 11.5 - 14.5 % PLATELET 116 108 - 406 K/uL MPV 9.4 8.9 - 12.9 FL  
 NRBC 0.0 0  WBC ABSOLUTE NRBC 0.00 0.00 - 0.01 K/uL NEUTROPHILS 71 32 - 75 % LYMPHOCYTES 18 12 - 49 % MONOCYTES 7 5 - 13 % EOSINOPHILS 3 0 - 7 % BASOPHILS 0 0 - 1 % IMMATURE GRANULOCYTES 1 (H) 0.0 - 0.5 % ABS. NEUTROPHILS 6.6 1.8 - 8.0 K/UL  
 ABS. LYMPHOCYTES 1.7 0.8 - 3.5 K/UL  
 ABS. MONOCYTES 0.6 0.0 - 1.0 K/UL  
 ABS. EOSINOPHILS 0.3 0.0 - 0.4 K/UL  
 ABS. BASOPHILS 0.0 0.0 - 0.1 K/UL  
 ABS. IMM. GRANS. 0.1 (H) 0.00 - 0.04 K/UL  
 DF AUTOMATED METABOLIC PANEL, COMPREHENSIVE Collection Time: 11/13/18  1:37 PM  
Result Value Ref Range Sodium 136 136 - 145 mmol/L Potassium 3.7 3.5 - 5.1 mmol/L Chloride 102 97 - 108 mmol/L  
 CO2 28 21 - 32 mmol/L Anion gap 6 5 - 15 mmol/L Glucose 172 (H) 65 - 100 mg/dL BUN 22 (H) 6 - 20 MG/DL Creatinine 1.17 (H) 0.55 - 1.02 MG/DL  
 BUN/Creatinine ratio 19 12 - 20 GFR est AA 53 (L) >60 ml/min/1.73m2 GFR est non-AA 43 (L) >60 ml/min/1.73m2 Calcium 10.6 (H) 8.5 - 10.1 MG/DL Bilirubin, total 0.6 0.2 - 1.0 MG/DL  
 ALT (SGPT) 14 12 - 78 U/L  
 AST (SGOT) 12 (L) 15 - 37 U/L Alk. phosphatase 103 45 - 117 U/L Protein, total 7.9 6.4 - 8.2 g/dL Albumin 3.1 (L) 3.5 - 5.0 g/dL Globulin 4.8 (H) 2.0 - 4.0 g/dL A-G Ratio 0.6 (L) 1.1 - 2.2 LIPASE Collection Time: 11/13/18  1:37 PM  
Result Value Ref Range Lipase 79 73 - 393 U/L  
CK Collection Time: 11/13/18  1:37 PM  
Result Value Ref Range CK 23 (L) 26 - 192 U/L  
TROPONIN I Collection Time: 11/13/18  1:37 PM  
Result Value Ref Range Troponin-I, Qt. 0.08 (H) <0.05 ng/mL LACTIC ACID Collection Time: 11/13/18  6:09 PM  
Result Value Ref Range Lactic acid 1.0 0.4 - 2.0 MMOL/L  
SAMPLES BEING HELD Collection Time: 11/13/18  6:09 PM  
Result Value Ref Range SAMPLES BEING HELD PST COMMENT   Add-on orders for these samples will be processed based on acceptable specimen integrity and analyte stability, which may vary by analyte. TROPONIN I Collection Time: 11/13/18  7:00 PM  
Result Value Ref Range Troponin-I, Qt. 0.08 (H) <0.05 ng/mL D DIMER Collection Time: 11/13/18  7:00 PM  
Result Value Ref Range D-dimer 1.38 (H) 0.00 - 0.65 mg/L FEU  
URINALYSIS W/ RFLX MICROSCOPIC Collection Time: 11/13/18  9:38 PM  
Result Value Ref Range Color YELLOW/STRAW Appearance CLEAR CLEAR Specific gravity >1.030 (H) 1.003 - 1.030  
 pH (UA) 7.5 5.0 - 8.0 Protein NEGATIVE  NEG mg/dL Glucose NEGATIVE  NEG mg/dL Ketone TRACE (A) NEG mg/dL Bilirubin NEGATIVE  NEG Blood NEGATIVE  NEG Urobilinogen 1.0 0.2 - 1.0 EU/dL Nitrites NEGATIVE  NEG Leukocyte Esterase NEGATIVE  NEG Radiologic Studies -  
DUPLEX LOWER EXT VENOUS BILAT Final Result Impression:  
 IMPRESSION: No evidence of DVT. Narrative:  
 INDICATION: Bilateral Leg swelling, pain, DVT suspected Exam: Duplex Doppler sonography of right and left lower extremities was 
performed from the groin to the calf. Findings: The right and left common femoral, femoral and popliteal veins are 
compressible with normal color-flow and wave forms and response to physiologic 
maneuvers including Valsalva and augmentation. There is a 3.4 cm x 2.9 cm x 1.3 cm right Baker's cyst. There is a 4.2 cm x 3.0 
cm x 9 mm left Baker's cyst.  
  
  
  
 
  
  
  
  
XR FOOT RT AP/LAT Final Result Impression:  
 IMPRESSION: No acute fracture or dislocation. Narrative:  
 INDICATION: Trauma. Worsening foot swelling. Exam: AP, lateral, oblique views of the right foot. FINDINGS: There is no acute fracture or dislocation. The osseous structures are 
diffusely demineralized. Soft tissues are diffusely edematous. No radiodense 
foreign body is seen. Plantar calcaneal spurring is noted. No osseous erosion is visualized. There is no plain radiographic evidence of osteomyelitis. XR HAND LT MIN 3 V Final Result Impression:  
 IMPRESSION: No acute fracture or dislocation.  
   
  
  
  
Narrative:  
 INDICATION:  Trauma. Hand swelling. Exam: AP, lateral, oblique views of the left hand. FINDINGS: There is no acute fracture or dislocation. There is degenerative 
narrowing of the radiocarpal joint.  The osseous structures are diffusely 
demineralized. There are mild PIP, DIP and first carpometacarpal osteoarthritic 
degenerative changes. No osseous erosion is visualized. There is no plain 
radiographic evidence of osteomyelitis. No radiodense foreign body is seen. Soft 
tissues are diffusely edematous. XR HAND RT MIN 3 V Final Result Impression:  
 IMPRESSION: No acute fracture or dislocation. Narrative:  
 INDICATION:  Trauma. Hand swelling. Exam: AP, lateral, oblique views of the right hand. FINDINGS: There is no acute fracture or dislocation. Osseous structures are 
diffusely demineralized. No osseous erosion is visualized. There are mild PIP, 
DIP and first carpometacarpal osteoarthritic degenerative changes. Soft tissues 
overlying the proximal hand and distal forearm appear edematous. No radiodense 
foreign body is seen. XR FOOT LT AP/LAT Final Result Impression:  
 IMPRESSION: No acute fracture or dislocation. Narrative:  
 INDICATION: Trauma. Worsening lower extremity swelling over last few days. Exam: AP, lateral, oblique views of the left foot. FINDINGS: There is no acute fracture or dislocation. The osseous structures are 
diffusely demineralized. Soft tissues are diffusely edematous. There is no plain 
radiographic evidence of osteomyelitis. No radiodense foreign body is 
visualized. Plantar calcaneal spurring is noted. CT Results  (Last 48 hours) 11/13/18 2054  CTA [facility] CONT Final result Impression:  IMPRESSION: No evidence pulmonary embolism. Narrative:  INDICATION: Acute chest pain, elevated d-dimer, evaluate for pulmonary embolism. CT pulmonary angiogram is performed with 2.5 mm collimation. 100 mL of nonionic IV Isovue-370 was administered for exam. 3D coronal and sagittal postprocessed  
images were performed for this examination. CT dose reduction was achieved through use of a standardized protocol tailored  
for this examination and automatic exposure control for dose modulation. Chest:   
   
CTPA: The pulmonary arteries are well opacified and there is no evidence of  
pulmonary embolism. Thoracic aorta is normal in course and caliber and there is  
no aortic dissection. Lymph nodes: There is no axillary, mediastinal or hilar lymphadenopathy. Subcentimeter mediastinal and axillary lymph nodes are noted. Heart: The heart is normal in the size and there is no pericardial effusion. Extensive atherosclerotic calcifications are noted within the coronary arteries. Lungs: There is bibasilar cylindrical bronchiectasis. There is very mild  
bibasilar scarring. There is a small fat-containing left Bochdalek hernia. Pleura: There is no pleural fluid. Bones: No lytic or sclerotic osseous lesion is visualized. Upper abdomen: The visualized upper abdominal structures are otherwise normal.  
   
  
 11/13/18 1958  CT ABD PELV W CONT Final result Impression:  IMPRESSION: No bowel obstruction, ileus or perforation. No intra-abdominal  
abscess. Narrative:  INDICATION: Abdominal pain bilateral lower extremity swelling, worsening over  
past few days, shortness of breath. CT of the abdomen and pelvis is performed with 5 mm collimation. Study is  
performed with PO and 100 cc of nonionic Isovue 370. Sagittal and coronal  
reformatted images were also performed. CT dose reduction was achieved with the use of the standardized protocol  
tailored for this examination and automatic exposure control for dose  
modulation. Direct comparison is made to prior CT dated March 2018. Findings:  
   
Lung bases: There is stable bibasilar bronchiectasis and mild bibasilar scarring Liver: The liver is normal.  
   
Adrenals: Adrenal glands are normal.  
   
Pancreas: The pancreas is normal.  
   
Gallbladder: The gallbladder is surgically absent. Kidneys: There is bilateral multi focal renal cortical scarring. There are  
several subcentimeter renal hypodensities, too small to further characterize,  
but statistically likely to represent cysts. There is no hydronephrosis. Spleen: The spleen is normal.  
   
Lymph nodes. There is no sherron hepatitis, mesenteric, retroperitoneal or pelvic  
lymphadenopathy. Bowel: No thickened or dilated loop of large or small bowel is visualized. Scattered colonic diverticulosis is noted. There is a moderate amount of stool  
in the colon. Appendix: The appendix is visualized, however there are no secondary signs of  
acute appendicitis. Urinary bladder: Urinary bladder is partially filled and grossly normal.  
   
Bones: There is a 6 mm grade 1 anterolisthesis of L4 with respect to L5,  
unchanged compared to prior CT dated March 2018. Alignment is otherwise normal.  
Osseous structures are diffusely demineralized. Miscellaneous: There is no free intraperitoneal fluid or gas. There is no focal  
fluid collection to suggest abscess. The uterus is absent. CXR Results  (Last 48 hours)  
          
 11/13/18 2046  XR CHEST PA LAT Final result Impression:  IMPRESSION: No acute cardiopulmonary disease. Narrative: Indication: Shortness of breath. Exam: PA and lateral views of the chest.  
   
Direct comparison is made to prior CXR dated May 2018. Findings: Cardiomediastinal silhouette is within normal limits. Intact pacer  
leads extending to the right atrium and right ventricle. Lungs are clear  
bilaterally. Pleural spaces are normal. Osseous structures are diffusely  
demineralized, but intact. Medical Decision Making I am the first provider for this patient. I reviewed the vital signs, available nursing notes, past medical history, past surgical history, family history and social history. Vital Signs-Reviewed the patient's vital signs. Patient Vitals for the past 12 hrs: 
 Temp Pulse Resp BP SpO2  
11/13/18 2000  68 16 122/60 90 % 11/13/18 1722     99 % 11/13/18 1316 98.5 °F (36.9 °C) 83 18 132/78 96 % Pulse Oximetry Analysis - 96% on RA Cardiac Monitor:  
Rate: 83 bpm 
Rhythm: Normal Sinus Rhythm EKG interpretation: (Preliminary)1325 Rhythm: AV dual-paced rhythm with prolonged AV conduction with frequent ventricular paced complexes; and paced. Rate (approx.): 69; Axis: normal; AL interval: 224; QRS interval: normal ; ST/T wave: normal; Other findings: no significant change, past MI, 3-4 stents. This note was written by YAHIR Hayes, as dictated by Geary Kussmaul, MD. Records Reviewed: Nursing Notes and Old Medical Records Provider Notes (Medical Decision Making): DDx: arthritis, CHF, peripheral edema, DVT, pancreatitis, diverticulitis, obstruction, UTI 
 
ED Course:  
Initial assessment performed. The patients presenting problems have been discussed, and they are in agreement with the care plan formulated and outlined with them. I have encouraged them to ask questions as they arise throughout their visit. 10:56 PM 
Pt has been re-evaluated and is ready for discharge. Critical Care Time:  
0 Disposition: 
DISCHARGE NOTE: 
11:09 PM 
The patient is ready for discharge.  The patients signs, symptoms, diagnosis, and instructions for discharge have been discussed and the pt has conveyed their understanding. The patient is to follow up as recommended or return to the ER should their symptoms worsen. Plan has been discussed and patient has conveyed their agreement. PLAN: 
1. Discharge Current Discharge Medication List  
  
START taking these medications Details  
polyethylene glycol (MIRALAX) 17 gram/dose powder Take 17 g by mouth daily as needed. 1 tablespoon with 8 oz of water daily 
Qty: 119 g, Refills: 0  
  
methocarbamol (ROBAXIN-750) 750 mg tablet Take 1 Tab by mouth four (4) times daily as needed. Qty: 20 Tab, Refills: 0  
  
  
 
2. Follow-up Information Follow up With Specialties Details Why Contact Info Sara Barnes NP Family Practice In 2 days As needed [address] [phone] [facility] EMERGENCY DEPT Emergency Medicine  If symptoms worsen [address] [facility] 
[phone] Return to ED if worse Diagnosis Clinical Impression: 1. Baker's cyst of knee, left 2. Baker's cyst of knee, right 3. Hand arthritis 4. Constipation, unspecified constipation type 5. Diverticulosis of colon 6. Atypical chest pain Attestations: This note is prepared by Susanna Lee, acting as Scribe for Daniella Cullen MD. 
 
Daniella Cullen MD: The scribe's documentation has been prepared under my direction and personally reviewed by me in its entirety. I confirm that the note above accurately reflects all work, treatment, procedures, and medical decision making performed by me.

## 2018-11-13 NOTE — ED NOTES
Assumed care of pt from triage. Pt is A&O x 4. Pt reports CC of bilateral foot swelling that is chronic, however has gotten worse in the past few days. Pt reports hands are now swollen and very tender. Pt also reports intermittent SOB, however does not feel SOB right now. Pt placed on monitor x 3. VSS at this time.

## 2018-11-14 NOTE — DISCHARGE INSTRUCTIONS
Arthritis: Care Instructions  Your Care Instructions  Arthritis, also called osteoarthritis, is a breakdown of the cartilage that cushions your joints. When the cartilage wears down, your bones rub against each other. This causes pain and stiffness. Many people have some arthritis as they age. Arthritis most often affects the joints of the spine, hands, hips, knees, or feet. You can take simple measures to protect your joints, ease your pain, and help you stay active. Follow-up care is a key part of your treatment and safety. Be sure to make and go to all appointments, and call your doctor if you are having problems. It's also a good idea to know your test results and keep a list of the medicines you take. How can you care for yourself at home? · Stay at a healthy weight. Being overweight puts extra strain on your joints. · Talk to your doctor or physical therapist about exercises that will help ease joint pain. ? Stretch. You may enjoy gentle forms of yoga to help keep your joints and muscles flexible. ? Walk instead of jog. Other types of exercise that are less stressful on the joints include riding a bicycle, swimming, hua chi, or water exercise. ? Lift weights. Strong muscles help reduce stress on your joints. Stronger thigh muscles, for example, take some of the stress off of the knees and hips. Learn the right way to lift weights so you do not make joint pain worse. · Take your medicines exactly as prescribed. Call your doctor if you think you are having a problem with your medicine. · Take pain medicines exactly as directed. ? If the doctor gave you a prescription medicine for pain, take it as prescribed. ? If you are not taking a prescription pain medicine, ask your doctor if you can take an over-the-counter medicine. · Use a cane, crutch, walker, or another device if you need help to get around. These can help rest your joints.  You also can use other things to make life easier, such as a higher toilet seat and padded handles on kitchen utensils. · Do not sit in low chairs, which can make it hard to get up. · Put heat or cold on your sore joints as needed. Use whichever helps you most. You also can take turns with hot and cold packs. ? Apply heat 2 or 3 times a day for 20 to 30 minutes--using a heating pad, hot shower, or hot pack--to relieve pain and stiffness. ? Put ice or a cold pack on your sore joint for 10 to 20 minutes at a time. Put a thin cloth between the ice and your skin. When should you call for help? Call your doctor now or seek immediate medical care if:    · You have sudden swelling, warmth, or pain in any joint.     · You have joint pain and a fever or rash.     · You have such bad pain that you cannot use a joint.    Watch closely for changes in your health, and be sure to contact your doctor if:    · You have mild joint symptoms that continue even with more than 6 weeks of care at home.     · You have stomach pain or other problems with your medicine. Where can you learn more? Go to http://nany-ariella.info/. Enter J998 in the search box to learn more about \"Arthritis: Care Instructions. \"  Current as of: June 11, 2018  Content Version: 11.8  © 2507-2989 [employer]. Care instructions adapted under license by [employer] (which disclaims liability or warranty for this information). If you have questions about a medical condition or this instruction, always ask your healthcare professional. [place] [address] any warranty or liability for your use of this information. Chest Pain: Care Instructions  Your Care Instructions    There are many things that can cause chest pain. Some are not serious and will get better on their own in a few days. But some kinds of chest pain need more testing and treatment. Your doctor may have recommended a follow-up visit in the next 8 to 12 hours.  If you are not getting better, you may need more tests or treatment. Even though your doctor has released you, you still need to watch for any problems. The doctor carefully checked you, but sometimes problems can develop later. If you have new symptoms or if your symptoms do not get better, get medical care right away. If you have worse or different chest pain or pressure that lasts more than 5 minutes or you passed out (lost consciousness), call 911 or seek other emergency help right away. A medical visit is only one step in your treatment. Even if you feel better, you still need to do what your doctor recommends, such as going to all suggested follow-up appointments and taking medicines exactly as directed. This will help you recover and help prevent future problems. How can you care for yourself at home? · Rest until you feel better. · Take your medicine exactly as prescribed. Call your doctor if you think you are having a problem with your medicine. · Do not drive after taking a prescription pain medicine. When should you call for help? Call 911 if:    · You passed out (lost consciousness).     · You have severe difficulty breathing.     · You have symptoms of a heart attack. These may include:  ? Chest pain or pressure, or a strange feeling in your chest.  ? Sweating. ? Shortness of breath. ? Nausea or vomiting. ? Pain, pressure, or a strange feeling in your back, neck, jaw, or upper belly or in one or both shoulders or arms. ? Lightheadedness or sudden weakness. ? A fast or irregular heartbeat. After you call 911, the  may tell you to chew 1 adult-strength or 2 to 4 low-dose aspirin. Wait for an ambulance. Do not try to drive yourself.    Call your doctor today if:    · You have any trouble breathing.     · Your chest pain gets worse.     · You are dizzy or lightheaded, or you feel like you may faint.     · You are not getting better as expected.     · You are having new or different chest pain.    Where can you learn more? Go to http://nany-ariella.info/. Enter A120 in the search box to learn more about \"Chest Pain: Care Instructions. \"  Current as of: November 20, 2017  Content Version: 11.8  © 2297-3273 [employer]. Care instructions adapted under license by [employer] (which disclaims liability or warranty for this information). If you have questions about a medical condition or this instruction, always ask your healthcare professional. [address] any warranty or liability for your use of this information. Baker's Cyst: Care Instructions  Your Care Instructions    A Baker's cyst is a swelling behind the knee. It may cause pain or stiffness when you bend your knee or straighten it all the way. Baker's cysts are also called popliteal cysts. If you have arthritis or another condition that is the cause of the Baker's cyst, your doctor may treat that condition. A Baker's cyst may go away on its own. If not, or if it is causing a lot of discomfort, your doctor may drain the fluid that has built up behind the knee. In some cases, a Baker's cyst is removed in surgery. There are things you can do at home, such as staying off your leg, to reduce the swelling and pain. Follow-up care is a key part of your treatment and safety. Be sure to make and go to all appointments, and call your doctor if you are having problems. It's also a good idea to know your test results and keep a list of the medicines you take. How can you care for yourself at home? · Rest your knee as much as possible. · Ask your doctor if you can take an over-the-counter pain medicine, such as acetaminophen (Tylenol), ibuprofen (Advil, Motrin), or naproxen (Aleve). Be safe with medicines. Read and follow all instructions on the label. · Use a cane, a crutch, a walker, or another device if you need help to get around. These can help rest your knees.   · If you have an elastic bandage, make sure it is snug but not so tight that your leg is numb, tingles, or swells below the bandage. Ask your doctor if you can loosen the bandage if it is too tight. · Follow your doctor's instructions about how much weight you can put on your knee. · Stay at a healthy weight. Being overweight puts extra strain on your knee. When should you call for help? Call 911 anytime you think you may need emergency care. For example, call if:    · You have chest pain, are short of breath, or you cough up blood.    Call your doctor now or seek immediate medical care if:    · You have new or worse pain.     · Your foot is cool or pale or changes color.     · You have tingling, weakness, or numbness in your foot or toes.     · You have signs of a blood clot in your leg (called a deep vein thrombosis), such as:  ? Pain in your calf, back of the knee, thigh, or groin. ? Redness or swelling in your leg.    Watch closely for changes in your health, and be sure to contact your doctor if:    · You do not get better as expected. Where can you learn more? Go to http://nany-ariella.info/. Enter L871 in the search box to learn more about \"Baker's Cyst: Care Instructions. \"  Current as of: November 29, 2017  Content Version: 11.8  © 3621-5501 [employer]. Care instructions adapted under license by [employer] (which disclaims liability or warranty for this information). If you have questions about a medical condition or this instruction, always ask your healthcare professional. [place] [address] any warranty or liability for your use of this information. Constipation: Care Instructions  Your Care Instructions    Constipation means that you have a hard time passing stools (bowel movements). People pass stools from 3 times a day to once every 3 days. What is normal for you may be different. Constipation may occur with pain in the rectum and cramping.  The pain may get worse when you try to pass stools. Sometimes there are small amounts of bright red blood on toilet paper or the surface of stools. This is because of enlarged veins near the rectum (hemorrhoids). A few changes in your diet and lifestyle may help you avoid ongoing constipation. Your doctor may also prescribe medicine to help loosen your stool. Some medicines can cause constipation. These include pain medicines and antidepressants. Tell your doctor about all the medicines you take. Your doctor may want to make a medicine change to ease your symptoms. Follow-up care is a key part of your treatment and safety. Be sure to make and go to all appointments, and call your doctor if you are having problems. It's also a good idea to know your test results and keep a list of the medicines you take. How can you care for yourself at home? · Drink plenty of fluids, enough so that your urine is light yellow or clear like water. If you have kidney, heart, or liver disease and have to limit fluids, talk with your doctor before you increase the amount of fluids you drink. · Include high-fiber foods in your diet each day. These include fruits, vegetables, beans, and whole grains. · Get at least 30 minutes of exercise on most days of the week. Walking is a good choice. You also may want to do other activities, such as running, swimming, cycling, or playing tennis or team sports. · Take a fiber supplement, such as Citrucel or Metamucil, every day. Read and follow all instructions on the label. · Schedule time each day for a bowel movement. A daily routine may help. Take your time having your bowel movement. · Support your feet with a small step stool when you sit on the toilet. This helps flex your hips and places your pelvis in a squatting position. · Your doctor may recommend an over-the-counter laxative to relieve your constipation. Examples are Milk of Magnesia and MiraLax.  Read and follow all instructions on the label. Do not use laxatives on a long-term basis. When should you call for help? Call your doctor now or seek immediate medical care if:    · You have new or worse belly pain.     · You have new or worse nausea or vomiting.     · You have blood in your stools.    Watch closely for changes in your health, and be sure to contact your doctor if:    · Your constipation is getting worse.     · You do not get better as expected. Where can you learn more? Go to http://nany-ariella.info/. Enter [id number] in the search box to learn more about \"Constipation: Care Instructions. \"  Current as of: November 20, 2017  Content Version: 11.8  © 4204-0663 [employer]. Care instructions adapted under license by [employer] (which disclaims liability or warranty for this information). If you have questions about a medical condition or this instruction, always ask your healthcare professional. [place] [address] any warranty or liability for your use of this information. Diverticulosis: Care Instructions  Your Care Instructions  In diverticulosis, pouches called diverticula form in the wall of the large intestine (colon). The pouches do not cause any pain or other symptoms. Most people who have diverticulosis do not know they have it. But the pouches sometimes bleed, and if they become infected, they can cause pain and other symptoms. When this happens, it is called diverticulitis. Diverticula form when pressure pushes the wall of the colon outward at certain weak points. A diet that is too low in fiber can cause diverticula. Follow-up care is a key part of your treatment and safety. Be sure to make and go to all appointments, and call your doctor if you are having problems. It's also a good idea to know your test results and keep a list of the medicines you take. How can you care for yourself at home?   · Include fruits, leafy green vegetables, beans, and whole grains in your diet each day. These foods are high in fiber. · Take a fiber supplement, such as Citrucel or Metamucil, every day if needed. Read and follow all instructions on the label. · Drink plenty of fluids, enough so that your urine is light yellow or clear like water. If you have kidney, heart, or liver disease and have to limit fluids, talk with your doctor before you increase the amount of fluids you drink. · Get at least 30 minutes of exercise on most days of the week. Walking is a good choice. You also may want to do other activities, such as running, swimming, cycling, or playing tennis or team sports. · Cut out foods that cause gas, pain, or other symptoms. When should you call for help? Call your doctor now or seek immediate medical care if:    · You have belly pain.     · You pass maroon or very bloody stools.     · You have a fever.     · You have nausea and vomiting.     · You have unusual changes in your bowel movements or abdominal swelling.     · You have burning pain when you urinate.     · You have abnormal vaginal discharge.     · You have shoulder pain.     · You have cramping pain that does not get better when you have a bowel movement or pass gas.     · You pass gas or stool from your urethra while urinating.    Watch closely for changes in your health, and be sure to contact your doctor if you have any problems. Where can you learn more? Go to http://nany-ariella.info/. Enter B168 in the search box to learn more about \"Diverticulosis: Care Instructions. \"  Current as of: March 28, 2018  Content Version: 11.8  © 2090-4073 [employer]. Care instructions adapted under license by [employer] (which disclaims liability or warranty for this information).  If you have questions about a medical condition or this instruction, always ask your healthcare professional. [address] any warranty or liability for your use of this information.

## 2018-11-14 NOTE — ED NOTES
Pt discharged by Dr Damaso Alonso. Pt provided with discharge instructions Rx and instructions on follow up care. Pt out of ED via wheelchair accompanied by this RN and PCT.

## 2018-11-16 ENCOUNTER — HOSPITAL ENCOUNTER (EMERGENCY)
Age: 83
Discharge: HOME | End: 2018-11-16
Attending: EMERGENCY MEDICINE
Payer: MEDICARE

## 2018-11-16 VITALS
BODY MASS INDEX: 36.44 KG/M2 | TEMPERATURE: 97.8 F | OXYGEN SATURATION: 97 % | HEIGHT: 56 IN | WEIGHT: 162 LBS | RESPIRATION RATE: 16 BRPM | SYSTOLIC BLOOD PRESSURE: 131 MMHG | DIASTOLIC BLOOD PRESSURE: 92 MMHG | HEART RATE: 71 BPM

## 2018-11-16 DIAGNOSIS — H11.32 SUBCONJUNCTIVAL HEMORRHAGE OF LEFT EYE: Primary | ICD-10-CM

## 2018-11-16 DIAGNOSIS — S05.02XA ABRASION OF LEFT CORNEA, INITIAL ENCOUNTER: ICD-10-CM

## 2018-11-16 PROCEDURE — 99282 EMERGENCY DEPT VISIT SF MDM: CPT

## 2018-11-16 RX ORDER — TETRACAINE HYDROCHLORIDE 5 MG/ML
1 SOLUTION OPHTHALMIC
Status: DISCONTINUED | OUTPATIENT
Start: 2018-11-16 | End: 2018-11-16 | Stop reason: HOSPADM

## 2018-11-16 RX ORDER — ERYTHROMYCIN 5 MG/G
OINTMENT OPHTHALMIC
Qty: 3.5 G | Refills: 0 | Status: SHIPPED | OUTPATIENT
Start: 2018-11-16 | End: 2019-07-25

## 2018-11-16 NOTE — ED TRIAGE NOTES
Pt stated her eye started to become red around 1pm today at her MD appt. Pt stated bit blurry at times.

## 2018-11-16 NOTE — DISCHARGE INSTRUCTIONS
Thank you for allowing us to provide you with care today. We hope we addressed all of your concerns and needs. We strive to provide excellent quality care in the Emergency Department. Please rate us as excellent, as anything less than excellent does not meet our expectations. If you feel that you have not received excellent quality care or timely care, please ask to speak to the nurse manager. Please choose us in the future for your continued health care needs. The exam and treatment you received in the Emergency Department were for an urgent problem and are not intended as complete care. It is important that you follow-up with a doctor, nurse practitioner, or  [id number] assistant to: (1) confirm your diagnosis, (2) re-evaluation of changes in your illness and treatment, and (3) for ongoing care. If your symptoms become worse or you do not improve as expected and you are unable to reach your usual health care provider, you should return to the Emergency Department. We are available 24 hours a day. Take this sheet with you when you go to your follow-up visit. If you have any problem arranging the follow-up visit, contact the Emergency Department immediately. Make an appointment with your Primary Care doctor for follow up of this visit. Return to the ER if you are unable to be seen in the time recommended on your discharge instructions.

## 2018-11-16 NOTE — ROUTINE PROCESS
Zac MOORE reviewed discharge instructions with the patient and caregiver. The patient and caregiver verbalized understanding. Alert and stable for discharge.

## 2018-11-16 NOTE — ED NOTES
Assisted off exam table and to [facility],  Perineal care done; pt assisted to w/c.

## 2018-11-16 NOTE — ED PROVIDER NOTES
EMERGENCY DEPARTMENT HISTORY AND PHYSICAL EXAM 
 
 
Date: 11/16/2018 Patient Name: Janel Galvin History of Presenting Illness Chief Complaint Patient presents with [address] History Provided By: Patient and Patient's Daughter HPI: Janel Galvin, 80 y.o. female presents ambulatory to the ED with cc of several hours of painless and not progressive medial left eye redness that is not any worse with movement and light and started earlier at her doctor's office who told her to come to the ED for evaluation. Chief Complaint: eye redness Duration: several  Hours Timing:  Acute Location: medial left eye Quality: painless Severity: no pain Modifying Factors: not worse with movement or light Associated Symptoms: no vision loss There are no other complaints, changes, or physical findings at this time. PCP: Eric Anderson NP Current Facility-Administered Medications Medication Dose Route Frequency Provider Last Rate Last Dose  fluorescein (FUL-TAYLA) 1 mg ophthalmic strip 1 Strip  1 Strip Both Eyes NOW [facility]      
 tetracaine (PONTOCAINE) 0.5 % ophthalmic solution 1 Drop  1 Drop Left Eye NOW [facility] Current Outpatient Medications Medication Sig Dispense Refill  erythromycin (ILOTYCIN) ophthalmic ointment Apply 1/2 inch ribbon to affected eye three times daily for 5 days 3.5 g 0  
 nystatin (MYCOSTATIN) 100,000 unit/gram ointment Apply  to affected area two (2) times a day.  triamcinolone acetonide (KENALOG) 0.5 % ointment Apply  to affected area two (2) times a day. use thin layer  polyethylene glycol (MIRALAX) 17 gram/dose powder Take 17 g by mouth daily as needed. 1 tablespoon with 8 oz of water daily 119 g 0  
 methocarbamol (ROBAXIN-750) 750 mg tablet Take 1 Tab by mouth four (4) times daily as needed.  20 Tab 0  
 morphine sulfate/PF (MORPHINE, PF,) soln by Intrathecal route continuous. CREAM APPLIED DAILY FOR FOOT PAIN    
 BRILINTA 60 mg tab tablet take 1 tablet by mouth twice a day 60 Tab 11  
 isosorbide mononitrate ER (IMDUR) 30 mg tablet take 1 tablet by mouth once daily TO HELP PREVENT CHEST PAIN 90 Tab 3  
 Cetirizine (ZYRTEC) 10 mg cap Take  by mouth daily.  nitroglycerin (NITROSTAT) 0.4 mg SL tablet 1 Tab by SubLINGual route every five (5) minutes as needed (call 911 if not relieved by 3). 25 Tab 1  
 ticagrelor (BRILINTA) 60 mg tab tablet Take 1 Tab by mouth two (2) times a day. 180 Tab 0  
 doxazosin (CARDURA) 2 mg tablet Take 2 mg by mouth daily. 1  
 magnesium hydroxide (ISIDRO MILK OF MAGNESIA) 400 mg/5 mL suspension Take 30 mL by mouth daily as needed for Constipation.  gabapentin (NEURONTIN) 100 mg capsule Take 2 Caps by mouth three (3) times daily. 180 Cap 1  
 metoprolol tartrate (LOPRESSOR) 25 mg tablet Take 0.5 Tabs by mouth two (2) times a day. 60 Tab 1  
 bumetanide (BUMEX) 1 mg tablet Take 1 Tab by mouth daily. 30 Tab 1  
 magnesium oxide (MAG-OX) 400 mg tablet Take 1 Tab by mouth daily. 30 Tab 1  
 pantoprazole (PROTONIX) 40 mg tablet Take 1 Tab by mouth Daily (before breakfast). 30 Tab 1  
 traMADol (ULTRAM) 50 mg tablet Take 1 Tab by mouth every six (6) hours as needed for Pain. Max Daily Amount: 200 mg. 40 Tab 0  
 ferrous sulfate 325 mg (65 mg iron) tablet Take 1 Tab by mouth two (2) times daily (with meals). 60 Tab 1  
 acetaminophen (TYLENOL) 325 mg tablet Take 1,300 mg by mouth nightly.  dextran 70-hypromellose (ARTIFICIAL TEARS) ophthalmic solution Administer 1 Drop to both eyes as needed.  lidocaine 5 % topical cream Apply  to affected area two (2) times daily as needed for Itching.  fluticasone (FLONASE) 50 mcg/actuation nasal spray 2 Sprays by Both Nostrils route daily.  allopurinol (ZYLOPRIM) 100 mg tablet Take  by mouth daily.  aspirin delayed-release 81 mg tablet Take 81 mg by mouth daily.  albuterol (PROAIR HFA) 90 mcg/actuation inhaler Take 2 Puffs by inhalation every six (6) hours as needed for Wheezing. 2 PUFFS 4 TIMES DAILY  pravastatin (PRAVACHOL) 20 mg tablet Take 1 Tab by mouth nightly. 30 Tab 11  
 latanoprost (XALATAN) 0.005 % ophthalmic solution Administer 1 Drop to both eyes nightly. Past History Past Medical History: 
Past Medical History:  
Diagnosis Date  Asthma  CAD (coronary artery disease)  Diabetes ([facility] [address]  Hypertension  Irregular heart rate 4/25/13  Morbid obesity ([facility] [address]  Other ill-defined conditions(799.89)   
 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 
Past Surgical History:  
Procedure Laterality Date  HX CHOLECYSTECTOMY  HX CORONARY STENT PLACEMENT    
 HX HEART CATHETERIZATION    
 HX PACEMAKER    
 HX PACEMAKER PLACEMENT Left Family History: 
History reviewed. No pertinent family history. Social History: 
Social History Tobacco Use  Smoking status: Never Smoker  Smokeless tobacco: Never Used Substance Use Topics  Alcohol use: No  
 Drug use: No  
 
 
Allergies: Allergies Allergen Reactions  Pcn [Penicillins] Unknown (comments) Review of Systems Review of Systems Constitutional: Negative for fatigue and fever. HENT: Negative for ear pain and sore throat. Eyes: Positive for redness. Negative for pain and visual disturbance. Respiratory: Negative for cough and shortness of breath. Cardiovascular: Negative for chest pain and palpitations. Gastrointestinal: Negative for abdominal pain, nausea and vomiting. Genitourinary: Negative for dysuria, frequency and urgency. Musculoskeletal: Negative for back pain, gait problem, neck pain and neck stiffness. Skin: Negative for rash and wound. Neurological: Negative for dizziness, weakness, light-headedness, numbness and headaches.   
 
Physical Exam  
 Physical Exam  
Constitutional: She is oriented to person, place, and time. She appears well-developed and well-nourished. Non-toxic appearance. No distress. HENT:  
Head: Normocephalic and atraumatic. Right Ear: External ear normal.  
Left Ear: External ear normal.  
Nose: Nose normal.  
Mouth/Throat: Uvula is midline. No trismus in the jaw. Eyes: Conjunctivae and EOM are normal. Pupils are equal, round, and reactive to light. No scleral icterus. LEFT EYE: 
No periorbital edema or erythema Subconjunctival hemorrhage medially PERRL 
EOMI Neck: Normal range of motion and full passive range of motion without pain. Cardiovascular: Normal rate and regular rhythm. Pulmonary/Chest: Effort normal. No accessory muscle usage. No tachypnea. No respiratory distress. She has no decreased breath sounds. She has no wheezes. Abdominal: Soft. There is no tenderness. Musculoskeletal: Normal range of motion. Neurological: She is alert and oriented to person, place, and time. She is not disoriented. No cranial nerve deficit. GCS eye subscore is 4. GCS verbal subscore is 5. GCS motor subscore is 6. Skin: Skin is intact. No rash noted. Psychiatric: She has a normal mood and affect. Her speech is normal.  
Nursing note and vitals reviewed. Diagnostic Study Results Labs - No results found for this or any previous visit (from the past 12 hour(s)). Radiologic Studies - No orders to display CT Results  (Last 48 hours) None CXR Results  (Last 48 hours) None Medical Decision Making I am the first provider for this patient. I reviewed the vital signs, available nursing notes, past medical history, past surgical history, family history and social history. Vital Signs-Reviewed the patient's vital signs. Patient Vitals for the past 12 hrs: 
 Temp Pulse Resp BP SpO2  
11/16/18 1555 97.8 °F (36.6 °C) 71 16 (!) 131/92 97 % Records Reviewed: Nursing Notes, Old Medical Records, Previous Radiology Studies and Previous Laboratory Studies Provider Notes (Medical Decision Making): The patient takes anticoagulants and has a spontaneous medial left eye subconjunctival hemorrhage. Will perform a fluorescein exam and check IOPs. Procedure Note - Mk-pen Exam: 
5:19 PM  
Performed by Gilberto Branch PA-C: 
Pt's intraocular pressure in her LEFT eye was checked using a mk-pen. Prior to procedure mk-pen was calibrated and reset for accurate measurement. Pressure was 12 mmHg in her LEFT eye. The procedure took 1-15 minutes, and pt tolerated well. Procedure Note - Wood's lamp exam: 
5:19 PM 
Performed by: Gilberto Branch PA-C Pts LEFT eye was anesthetized with tetracaine, stained with fluorescein, and examined with a Wood's lamp, using lid eversion. Foreign body: no 
Fluorescein uptake: yes, showing uptake at 9 o'clock The procedure took 1-15 minutes, and pt tolerated well. ED Course:  
Initial assessment performed. The patients presenting problems have been discussed, and they are in agreement with the care plan formulated and outlined with them. I have encouraged them to ask questions as they arise throughout their visit. Disposition: 
Discharge PLAN: 
1. Current Discharge Medication List  
  
START taking these medications Details  
erythromycin (ILOTYCIN) ophthalmic ointment Apply 1/2 inch ribbon to affected eye three times daily for 5 days Qty: 3.5 g, Refills: 0  
  
  
 
2. Follow-up Information Follow up With Specialties Details Why Contact Info [employer]  Schedule an appointment as soon as possible for a visit EYE DOCTOR: call tomorrow to schedule follow up [facility] [phone] Return to ED if worse Diagnosis Clinical Impression: 1. Subconjunctival hemorrhage of left eye 2. Abrasion of left cornea, initial encounter

## 2018-11-26 RX ORDER — METOPROLOL TARTRATE 25 MG/1
TABLET, FILM COATED ORAL
Qty: 90 TAB | Refills: 3 | Status: SHIPPED | OUTPATIENT
Start: 2018-11-26 | End: 2019-07-25 | Stop reason: SDUPTHER

## 2018-12-08 ENCOUNTER — HOSPITAL ENCOUNTER (EMERGENCY)
Age: 83
Discharge: HOME | End: 2018-12-08
Attending: EMERGENCY MEDICINE | Admitting: EMERGENCY MEDICINE
Payer: MEDICARE

## 2018-12-08 ENCOUNTER — APPOINTMENT (OUTPATIENT)
Dept: GENERAL RADIOLOGY | Age: 83
End: 2018-12-08
Attending: EMERGENCY MEDICINE
Payer: MEDICARE

## 2018-12-08 VITALS
DIASTOLIC BLOOD PRESSURE: 67 MMHG | HEIGHT: 57 IN | WEIGHT: 156 LBS | TEMPERATURE: 97.8 F | HEART RATE: 80 BPM | OXYGEN SATURATION: 96 % | SYSTOLIC BLOOD PRESSURE: 100 MMHG | BODY MASS INDEX: 33.66 KG/M2 | RESPIRATION RATE: 12 BRPM

## 2018-12-08 DIAGNOSIS — T14.8XXA HEMATOMA: Primary | ICD-10-CM

## 2018-12-08 LAB
APPEARANCE UR: CLEAR
BACTERIA URNS QL MICRO: ABNORMAL /HPF
BILIRUB UR QL: NEGATIVE
COLOR UR: ABNORMAL
EPITH CASTS URNS QL MICRO: ABNORMAL /LPF
GLUCOSE UR STRIP.AUTO-MCNC: 250 MG/DL
HGB UR QL STRIP: NEGATIVE
HYALINE CASTS URNS QL MICRO: ABNORMAL /LPF (ref 0–5)
KETONES UR QL STRIP.AUTO: NEGATIVE MG/DL
LEUKOCYTE ESTERASE UR QL STRIP.AUTO: NEGATIVE
NITRITE UR QL STRIP.AUTO: NEGATIVE
PH UR STRIP: 6.5 [PH] (ref 5–8)
PROT UR STRIP-MCNC: NEGATIVE MG/DL
RBC #/AREA URNS HPF: ABNORMAL /HPF (ref 0–5)
SP GR UR REFRACTOMETRY: 1.01 (ref 1–1.03)
UA: UC IF INDICATED,UAUC: ABNORMAL
UROBILINOGEN UR QL STRIP.AUTO: 1 EU/DL (ref 0.2–1)
WBC URNS QL MICRO: ABNORMAL /HPF (ref 0–4)

## 2018-12-08 PROCEDURE — 73552 X-RAY EXAM OF FEMUR 2/>: CPT

## 2018-12-08 PROCEDURE — 73610 X-RAY EXAM OF ANKLE: CPT

## 2018-12-08 PROCEDURE — 87086 URINE CULTURE/COLONY COUNT: CPT

## 2018-12-08 PROCEDURE — 73630 X-RAY EXAM OF FOOT: CPT

## 2018-12-08 PROCEDURE — L1930 AFO PLASTIC: HCPCS

## 2018-12-08 PROCEDURE — 99283 EMERGENCY DEPT VISIT LOW MDM: CPT

## 2018-12-08 PROCEDURE — 81001 URINALYSIS AUTO W/SCOPE: CPT

## 2018-12-08 PROCEDURE — 77030036687 HC SHOE PSTOP S2SG -A

## 2018-12-08 RX ORDER — LIDOCAINE 4 G/100G
PATCH TOPICAL
Qty: 1 PACKAGE | Refills: 0 | Status: SHIPPED | OUTPATIENT
Start: 2018-12-08 | End: 2019-07-25

## 2018-12-09 NOTE — ED NOTES
Assumed care of patient from triage. Patient is A&Ox4, respirations even and unlabored. Pt. States she tripped and fell in the bathroom earlier today. C/O right leg and right foot pain. No deformity noted. Denies head injury or LOC. No other injury noted.

## 2018-12-09 NOTE — ED NOTES
Dr. Penelope Khalil has reviewed discharge instructions with the patient and caregiver. The patient and caregiver verbalized understanding. Pt. A&Ox4, respirations even and unlabored. VS stable as noted in flowsheet. Wheelchair assist from department; paperwork in hand.

## 2018-12-09 NOTE — ED PROVIDER NOTES
EMERGENCY DEPARTMENT HISTORY AND PHYSICAL EXAM 
 
 
Date: 12/8/2018 Patient Name: Yahir Souza History of Presenting Illness Chief Complaint Patient presents with  Fall Into triage via wheelchair with c/o Rt upper leg pain and Rt foot pain d/t tripping and falling in the bathroom pta. Denies head trauma/LOC. Denies feeling ill prior to falling.  Leg Pain History Provided By: Patient and Patient's Daughter HPI: Yahir Souza, 80 y.o. female with PMHx significant for HTN, CAD, diabetes, morbid obesity, and asthma, presents ambulatory to the ED with cc of new onset fall occurring today resulting in RLE pain and bruising. The pt's daughter reports that she did not see the pt fall. The pt's daughter states that the pt went to the bathroom and fell while going to the sink. The pt's daughter states that the pt uses a cane and got caught on the cane. The pt denies experiencing syncope. The pt's daughter reports that the pt hit her R foot and the R side of her leg. The pt denies hitting her head. The pt denies LOC. The pt's daughter reports that the pt is on blood thinners. The pt specifically denies experiencing fever, urinary sxs, incontinence, diaphoresis, cough, chills, HA, SOB, CP, or N/V/D. PMHx: HTN, CAD, diabetes, morbid obesity, and asthma PSHx: pacemaker placement (8/15/18), heart catheterization, coronary stent placement, SHx: - EtOH, - tobacco, - illicit drugs There are no other complaints, changes, or physical findings at this time. PCP: Paul Cushing, NP Current Outpatient Medications Medication Sig Dispense Refill  lidocaine 4 % patch Use as directed over affected area 1 Package 0  
 metoprolol tartrate (LOPRESSOR) 25 mg tablet take 1/2 tablet by mouth twice a day 90 Tab 3  
 erythromycin (ILOTYCIN) ophthalmic ointment Apply 1/2 inch ribbon to affected eye three times daily for 5 days 3.5 g 0  
  nystatin (MYCOSTATIN) 100,000 unit/gram ointment Apply  to affected area two (2) times a day.  triamcinolone acetonide (KENALOG) 0.5 % ointment Apply  to affected area two (2) times a day. use thin layer  polyethylene glycol (MIRALAX) 17 gram/dose powder Take 17 g by mouth daily as needed. 1 tablespoon with 8 oz of water daily 119 g 0  
 methocarbamol (ROBAXIN-750) 750 mg tablet Take 1 Tab by mouth four (4) times daily as needed. 20 Tab 0  
 morphine sulfate/PF (MORPHINE, PF,) soln by Intrathecal route continuous. CREAM APPLIED DAILY FOR FOOT PAIN    
 BRILINTA 60 mg tab tablet take 1 tablet by mouth twice a day 60 Tab 11  
 isosorbide mononitrate ER (IMDUR) 30 mg tablet take 1 tablet by mouth once daily TO HELP PREVENT CHEST PAIN 90 Tab 3  
 Cetirizine (ZYRTEC) 10 mg cap Take  by mouth daily.  nitroglycerin (NITROSTAT) 0.4 mg SL tablet 1 Tab by SubLINGual route every five (5) minutes as needed (call 911 if not relieved by 3). 25 Tab 1  
 ticagrelor (BRILINTA) 60 mg tab tablet Take 1 Tab by mouth two (2) times a day. 180 Tab 0  
 doxazosin (CARDURA) 2 mg tablet Take 2 mg by mouth daily. 1  
 magnesium hydroxide (ISIDRO MILK OF MAGNESIA) 400 mg/5 mL suspension Take 30 mL by mouth daily as needed for Constipation.  gabapentin (NEURONTIN) 100 mg capsule Take 2 Caps by mouth three (3) times daily. 180 Cap 1  
 metoprolol tartrate (LOPRESSOR) 25 mg tablet Take 0.5 Tabs by mouth two (2) times a day. 60 Tab 1  
 bumetanide (BUMEX) 1 mg tablet Take 1 Tab by mouth daily. 30 Tab 1  
 magnesium oxide (MAG-OX) 400 mg tablet Take 1 Tab by mouth daily. 30 Tab 1  
 pantoprazole (PROTONIX) 40 mg tablet Take 1 Tab by mouth Daily (before breakfast). 30 Tab 1  
 traMADol (ULTRAM) 50 mg tablet Take 1 Tab by mouth every six (6) hours as needed for Pain. Max Daily Amount: 200 mg. 40 Tab 0  
 ferrous sulfate 325 mg (65 mg iron) tablet Take 1 Tab by mouth two (2) times daily (with meals).  60 Tab 1  
  acetaminophen (TYLENOL) 325 mg tablet Take 1,300 mg by mouth nightly.  dextran 70-hypromellose (ARTIFICIAL TEARS) ophthalmic solution Administer 1 Drop to both eyes as needed.  fluticasone (FLONASE) 50 mcg/actuation nasal spray 2 Sprays by Both Nostrils route daily.  allopurinol (ZYLOPRIM) 100 mg tablet Take  by mouth daily.  aspirin delayed-release 81 mg tablet Take 81 mg by mouth daily.  albuterol (PROAIR HFA) 90 mcg/actuation inhaler Take 2 Puffs by inhalation every six (6) hours as needed for Wheezing. 2 PUFFS 4 TIMES DAILY  pravastatin (PRAVACHOL) 20 mg tablet Take 1 Tab by mouth nightly. 30 Tab 11  
 latanoprost (XALATAN) 0.005 % ophthalmic solution Administer 1 Drop to both eyes nightly. Past History Past Medical History: 
Past Medical History:  
Diagnosis Date  Asthma  CAD (coronary artery disease)  Diabetes ([address]  Hypertension  Irregular heart rate 4/25/13  Morbid obesity ([address]  Other ill-defined conditions(799.89)   
 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 
Past Surgical History:  
Procedure Laterality Date  COLONOSCOPY N/A 8/15/2016 COLONOSCOPY performed by Freeman Macias MD at [facility] ENDOSCOPY  
 HX CHOLECYSTECTOMY  HX CORONARY STENT PLACEMENT    
 HX HEART CATHETERIZATION    
 HX PACEMAKER    
 HX PACEMAKER PLACEMENT Left Family History: 
History reviewed. No pertinent family history. Social History: 
Social History Tobacco Use  Smoking status: Never Smoker  Smokeless tobacco: Never Used Substance Use Topics  Alcohol use: No  
 Drug use: No  
 
 
Allergies: Allergies Allergen Reactions  Pcn [Penicillins] Unknown (comments) Review of Systems Review of Systems Constitutional: Negative for chills, diaphoresis, fatigue and fever. HENT: Negative. Eyes: Negative. Respiratory: Negative for cough, shortness of breath and wheezing. Cardiovascular: Negative for chest pain and leg swelling. Gastrointestinal: Negative for blood in stool, constipation, diarrhea, nausea and vomiting.  
     -incontinence Endocrine: Negative. Genitourinary: Negative. Negative for difficulty urinating and dysuria. Musculoskeletal: Positive for arthralgias and myalgias. Skin: Negative for rash.  
     +bruising BLE Allergic/Immunologic: Negative. Neurological: Negative for syncope, weakness, numbness and headaches. -LOC 
-head hit Hematological: Negative. Psychiatric/Behavioral: Negative. Physical Exam  
Physical Exam  
Constitutional: She is oriented to person, place, and time. She appears well-developed and well-nourished. HENT:  
Head: Normocephalic and atraumatic. Mouth/Throat: Mucous membranes are normal.  
Eyes: EOM are normal. Pupils are equal, round, and reactive to light. Neck: Normal range of motion. No JVD present. No tracheal deviation present. Cardiovascular: Normal rate, regular rhythm, normal heart sounds and intact distal pulses. Exam reveals no gallop and no friction rub. No murmur heard. Pulses: 
     Dorsalis pedis pulses are 2+ on the right side, and 2+ on the left side. Pulmonary/Chest: Effort normal and breath sounds normal. No stridor. She has no wheezes. She has no rales. Abdominal: Soft. Bowel sounds are normal. She exhibits no distension and no mass. There is tenderness (mild lower sided abdominal tenderness). There is no rebound and no guarding. Musculoskeletal: Normal range of motion. Right hip: She exhibits tenderness (anterior lateral hip). Left hip: She exhibits no tenderness. Right upper leg: She exhibits edema. Right lower leg: She exhibits edema. Left lower leg: She exhibits edema. +2 pitting edema FROM on R hip Neurological: She is alert and oriented to person, place, and time. Skin: Skin is warm and dry. Ecchymosis (hematoma w/ associated ecchymosis over the R lateral thigh) and lesion (scattered ecchymotic lesions over the L inferior medial thigh and R medial lateral thigh) noted. No rash noted. Psychiatric: She has a normal mood and affect. Her behavior is normal. Judgment and thought content normal.  
 
 
Diagnostic Study Results Labs - Recent Results (from the past 12 hour(s)) URINALYSIS W/ REFLEX CULTURE Collection Time: 12/08/18  8:59 PM  
Result Value Ref Range Color YELLOW/STRAW Appearance CLEAR CLEAR Specific gravity 1.010 1.003 - 1.030    
 pH (UA) 6.5 5.0 - 8.0 Protein NEGATIVE  NEG mg/dL Glucose 250 (A) NEG mg/dL Ketone NEGATIVE  NEG mg/dL Bilirubin NEGATIVE  NEG Blood NEGATIVE  NEG Urobilinogen 1.0 0.2 - 1.0 EU/dL Nitrites NEGATIVE  NEG Leukocyte Esterase NEGATIVE  NEG    
 WBC 0-4 0 - 4 /hpf  
 RBC 0-5 0 - 5 /hpf Epithelial cells FEW FEW /lpf Bacteria 2+ (A) NEG /hpf  
 UA:UC IF INDICATED URINE CULTURE ORDERED (A) CNI Hyaline cast 0-2 0 - 5 /lpf Radiologic Studies -  
XR ANKLE RT MIN 3 V (Final result) Result time 12/08/18 20:37:57 Final result by Christiane Orellana MD (12/08/18 20:37:57) Impression:  
 IMPRESSION: No acute abnormality. Narrative: EXAM:  XR ANKLE RT MIN 3 V 
 
INDICATION:  trauma/pain.  Right ankle pain COMPARISON: None. FINDINGS: Three views of the right ankle demonstrate diffuse osteopenia without 
fracture. . There is no other acute osseous or articular abnormality.  There are 
soft tissue calcifications.  
  
  
  
   
   
XR FOOT RT MIN 3 V (Final result) Result time 12/08/18 20:36:58 Final result by Christiane Orellana MD (12/08/18 20:36:58) Impression:  
 IMPRESSION:  No acute abnormality. Narrative:  EXAM:  XR FOOT RT MIN 3 V 
 
 INDICATION:   trauma/pain.  Right foot pain COMPARISON:  None. FINDINGS:  Three views of the right foot demonstrate osteopenia. No acute 
fracture. Mild degenerative changes of the first MTP joint.  There is soft 
tissue swelling.  
  
  
  
   
   
XR FEMUR RT 2 VS (Final result) Result time 12/08/18 20:35:55 Final result by Pk Castro MD (12/08/18 20:35:55) Impression:  
 IMPRESSION:  No acute abnormality. Narrative: EXAM:  XR FEMUR RT 2 VS 
 
INDICATION:   trauma/pain.  Right side pain COMPARISON: None. FINDINGS: Two views of the right femur demonstrate osteopenia. Alignment is 
normal. No fracture. There are degenerative changes of the right knee. Extensive 
vascular calcifications are noted. Medical Decision Making I am the first provider for this patient. I reviewed the vital signs, available nursing notes, past medical history, past surgical history, family history and social history. Vital Signs-Reviewed the patient's vital signs. Patient Vitals for the past 12 hrs: 
 Temp Pulse Resp BP SpO2  
12/08/18 2115     96 % 12/08/18 2045     94 % 12/08/18 1738 97.8 °F (36.6 °C) 80 12 100/67 98 % Records Reviewed: Nursing Notes and Old Medical Records Provider Notes (Medical Decision Making): DDx: hematoma, contusion, sprain, strain,  
 
ED Course:  
Initial assessment performed. The patients presenting problems have been discussed, and they are in agreement with the care plan formulated and outlined with them. I have encouraged them to ask questions as they arise throughout their visit. Critical Care Time: 0 minutes Disposition: 
DISCHARGE NOTE 
9:33 PM 
The patient has been re-evaluated and is ready for discharge. Reviewed available results with patient. Counseled pt on diagnosis and care plan. Pt has expressed understanding, and all questions have been answered.  Pt agrees with plan and agrees to F/U as recommended, or return to the ED if their sxs worsen. Discharge instructions have been provided and explained to the pt, along with reasons to return to the ED. Written by Domenica Maguire ED Scribe, as dictated by Trenton Carr DO. PLAN: 
1. Discharge Medication List as of 12/8/2018  9:33 PM  
  
CONTINUE these medications which have NOT CHANGED Details  
!! metoprolol tartrate (LOPRESSOR) 25 mg tablet take 1/2 tablet by mouth twice a day, Normal, Disp-90 Tab, R-3  
  
erythromycin (ILOTYCIN) ophthalmic ointment Apply 1/2 inch ribbon to affected eye three times daily for 5 days, Normal, Disp-3.5 g, R-0  
  
nystatin (MYCOSTATIN) 100,000 unit/gram ointment Apply  to affected area two (2) times a day., Historical Med  
  
triamcinolone acetonide (KENALOG) 0.5 % ointment Apply  to affected area two (2) times a day. use thin layer, Historical Med  
  
polyethylene glycol (MIRALAX) 17 gram/dose powder Take 17 g by mouth daily as needed. 1 tablespoon with 8 oz of water daily, Normal, Disp-119 g, R-0  
  
methocarbamol (ROBAXIN-750) 750 mg tablet Take 1 Tab by mouth four (4) times daily as needed., Normal, Disp-20 Tab, R-0  
  
morphine sulfate/PF (MORPHINE, PF,) soln by Intrathecal route continuous. CREAM APPLIED DAILY FOR FOOT PAIN, Historical Med  
  
!! BRILINTA 60 mg tab tablet take 1 tablet by mouth twice a day, Normal, Disp-60 Tab, R-11  
  
isosorbide mononitrate ER (IMDUR) 30 mg tablet take 1 tablet by mouth once daily TO HELP PREVENT CHEST PAIN, Normal, Disp-90 Tab, R-3 Cetirizine (ZYRTEC) 10 mg cap Take  by mouth daily. , Historical Med  
  
nitroglycerin (NITROSTAT) 0.4 mg SL tablet 1 Tab by SubLINGual route every five (5) minutes as needed (call 911 if not relieved by 3). , Normal, Disp-25 Tab, R-1  
  
!! ticagrelor (BRILINTA) 60 mg tab tablet Take 1 Tab by mouth two (2) times a day., Normal, Disp-180 Tab, R-0  
  
 doxazosin (CARDURA) 2 mg tablet Take 2 mg by mouth daily. , Historical Med, R-1  
  
magnesium hydroxide (ISIDRO MILK OF MAGNESIA) 400 mg/5 mL suspension Take 30 mL by mouth daily as needed for Constipation. , Historical Med  
  
gabapentin (NEURONTIN) 100 mg capsule Take 2 Caps by mouth three (3) times daily. , Print, Disp-180 Cap, R-1  
  
!! metoprolol tartrate (LOPRESSOR) 25 mg tablet Take 0.5 Tabs by mouth two (2) times a day., Print, Disp-60 Tab, R-1  
  
bumetanide (BUMEX) 1 mg tablet Take 1 Tab by mouth daily. , Print, Disp-30 Tab, R-1  
  
magnesium oxide (MAG-OX) 400 mg tablet Take 1 Tab by mouth daily. , Print, Disp-30 Tab, R-1  
  
pantoprazole (PROTONIX) 40 mg tablet Take 1 Tab by mouth Daily (before breakfast). , Print, Disp-30 Tab, R-1  
  
traMADol (ULTRAM) 50 mg tablet Take 1 Tab by mouth every six (6) hours as needed for Pain. Max Daily Amount: 200 mg., Print, Disp-40 Tab, R-0  
  
ferrous sulfate 325 mg (65 mg iron) tablet Take 1 Tab by mouth two (2) times daily (with meals). , Print, Disp-60 Tab, R-1  
  
acetaminophen (TYLENOL) 325 mg tablet Take 1,300 mg by mouth nightly., Historical Med  
  
dextran 70-hypromellose (ARTIFICIAL TEARS) ophthalmic solution Administer 1 Drop to both eyes as needed., Historical Med  
  
fluticasone (FLONASE) 50 mcg/actuation nasal spray 2 Sprays by Both Nostrils route daily. , Historical Med  
  
lidocaine 5 % topical cream Apply  to affected area two (2) times daily as needed for Itching., Historical Med  
  
allopurinol (ZYLOPRIM) 100 mg tablet Take  by mouth daily. , Historical Med  
  
aspirin delayed-release 81 mg tablet Take 81 mg by mouth daily. , Historical Med  
  
albuterol (PROAIR HFA) 90 mcg/actuation inhaler Take 2 Puffs by inhalation every six (6) hours as needed for Wheezing. 2 PUFFS 4 TIMES DAILY, Historical Med  
  
pravastatin (PRAVACHOL) 20 mg tablet Take 1 Tab by mouth nightly. , Print, Disp-30 Tab, R-11  
  
 latanoprost (XALATAN) 0.005 % ophthalmic solution Administer 1 Drop to both eyes nightly., Historical Med  
  
 !! - Potential duplicate medications found. Please discuss with provider. 2.  
Follow-up Information Follow up With Specialties Details Why Contact Info Everardo Villagomez NP Family Practice Schedule an appointment as soon as possible for a visit  [address] [phone] [facility] EMERGENCY DEPT Emergency Medicine  As needed, If symptoms worsen [address] [facility] 
[phone] Return to ED if worse Diagnosis Clinical Impression: 1. Hematoma Attestations: This note is prepared by Alivia Brandon, acting as Scribe for Natasha Cifunetes DO. Natasha Cifuentes DO: The scribe's documentation has been prepared under my direction and personally reviewed by me in its entirety. I confirm that the note above accurately reflects all work, treatment, procedures, and medical decision making performed by me.

## 2018-12-10 LAB
BACTERIA SPEC CULT: NORMAL
CC UR VC: NORMAL
SERVICE CMNT-IMP: NORMAL

## 2019-01-01 ENCOUNTER — OFFICE VISIT (OUTPATIENT)
Dept: CARDIOLOGY CLINIC | Age: 84
End: 2019-01-01

## 2019-01-01 VITALS
HEART RATE: 76 BPM | HEIGHT: 57 IN | BODY MASS INDEX: 31.31 KG/M2 | DIASTOLIC BLOOD PRESSURE: 70 MMHG | WEIGHT: 145.1 LBS | SYSTOLIC BLOOD PRESSURE: 118 MMHG | RESPIRATION RATE: 16 BRPM

## 2019-01-01 DIAGNOSIS — I50.32 CHRONIC DIASTOLIC HEART FAILURE (HCC): ICD-10-CM

## 2019-01-01 DIAGNOSIS — I10 ESSENTIAL HYPERTENSION: ICD-10-CM

## 2019-01-01 DIAGNOSIS — I44.1 MOBITZ TYPE II ATRIOVENTRICULAR BLOCK: ICD-10-CM

## 2019-01-01 DIAGNOSIS — Z95.0 PACEMAKER: Primary | ICD-10-CM

## 2019-01-01 DIAGNOSIS — I49.5 SICK SINUS SYNDROME (HCC): ICD-10-CM

## 2019-01-01 DIAGNOSIS — Z95.0 PACEMAKER: ICD-10-CM

## 2019-01-01 DIAGNOSIS — I25.10 CORONARY ARTERY DISEASE INVOLVING NATIVE CORONARY ARTERY OF NATIVE HEART WITHOUT ANGINA PECTORIS: Primary | ICD-10-CM

## 2019-01-01 DIAGNOSIS — N18.30 CKD (CHRONIC KIDNEY DISEASE), STAGE III (HCC): ICD-10-CM

## 2019-01-01 RX ORDER — METOPROLOL TARTRATE 25 MG/1
TABLET, FILM COATED ORAL
Qty: 90 TAB | Refills: 0 | Status: SHIPPED | OUTPATIENT
Start: 2019-01-01 | End: 2020-01-01

## 2019-02-03 RX ORDER — NITROGLYCERIN 0.4 MG/1
TABLET SUBLINGUAL
Qty: 25 TAB | Refills: 1 | Status: SHIPPED | OUTPATIENT
Start: 2019-02-03

## 2019-03-02 ENCOUNTER — HOSPITAL ENCOUNTER (EMERGENCY)
Age: 84
Discharge: HOME | End: 2019-03-02
Attending: EMERGENCY MEDICINE
Payer: MEDICARE

## 2019-03-02 VITALS
SYSTOLIC BLOOD PRESSURE: 118 MMHG | OXYGEN SATURATION: 96 % | TEMPERATURE: 97.6 F | DIASTOLIC BLOOD PRESSURE: 74 MMHG | HEART RATE: 60 BPM | RESPIRATION RATE: 18 BRPM | BODY MASS INDEX: 33.76 KG/M2 | HEIGHT: 57 IN

## 2019-03-02 DIAGNOSIS — E86.0 DEHYDRATION: Primary | ICD-10-CM

## 2019-03-02 DIAGNOSIS — R19.7 DIARRHEA, UNSPECIFIED TYPE: ICD-10-CM

## 2019-03-02 LAB
ALBUMIN SERPL-MCNC: 3.4 G/DL (ref 3.5–5)
ALBUMIN/GLOB SERPL: 0.8 {RATIO} (ref 1.1–2.2)
ALP SERPL-CCNC: 112 U/L (ref 45–117)
ALT SERPL-CCNC: 17 U/L (ref 12–78)
ANION GAP SERPL CALC-SCNC: 7 MMOL/L (ref 5–15)
APPEARANCE UR: CLEAR
AST SERPL-CCNC: 20 U/L (ref 15–37)
BACTERIA URNS QL MICRO: NEGATIVE /HPF
BASOPHILS # BLD: 0 K/UL (ref 0–0.1)
BASOPHILS NFR BLD: 0 % (ref 0–1)
BILIRUB SERPL-MCNC: 0.5 MG/DL (ref 0.2–1)
BILIRUB UR QL: NEGATIVE
BUN SERPL-MCNC: 25 MG/DL (ref 6–20)
BUN/CREAT SERPL: 25 (ref 12–20)
CALCIUM SERPL-MCNC: 9.9 MG/DL (ref 8.5–10.1)
CHLORIDE SERPL-SCNC: 106 MMOL/L (ref 97–108)
CO2 SERPL-SCNC: 27 MMOL/L (ref 21–32)
COLOR UR: NORMAL
CREAT SERPL-MCNC: 0.99 MG/DL (ref 0.55–1.02)
DIFFERENTIAL METHOD BLD: ABNORMAL
EOSINOPHIL # BLD: 0.3 K/UL (ref 0–0.4)
EOSINOPHIL NFR BLD: 3 % (ref 0–7)
EPITH CASTS URNS QL MICRO: NORMAL /LPF
ERYTHROCYTE [DISTWIDTH] IN BLOOD BY AUTOMATED COUNT: 14.4 % (ref 11.5–14.5)
GLOBULIN SER CALC-MCNC: 4.1 G/DL (ref 2–4)
GLUCOSE SERPL-MCNC: 133 MG/DL (ref 65–100)
GLUCOSE UR STRIP.AUTO-MCNC: NEGATIVE MG/DL
HCT VFR BLD AUTO: 38 % (ref 35–47)
HGB BLD-MCNC: 12.4 G/DL (ref 11.5–16)
HGB UR QL STRIP: NEGATIVE
HYALINE CASTS URNS QL MICRO: NORMAL /LPF (ref 0–5)
IMM GRANULOCYTES # BLD AUTO: 0.1 K/UL (ref 0–0.04)
IMM GRANULOCYTES NFR BLD AUTO: 1 % (ref 0–0.5)
KETONES UR QL STRIP.AUTO: NEGATIVE MG/DL
LEUKOCYTE ESTERASE UR QL STRIP.AUTO: NEGATIVE
LIPASE SERPL-CCNC: 88 U/L (ref 73–393)
LYMPHOCYTES # BLD: 2.4 K/UL (ref 0.8–3.5)
LYMPHOCYTES NFR BLD: 26 % (ref 12–49)
MCH RBC QN AUTO: 31.9 PG (ref 26–34)
MCHC RBC AUTO-ENTMCNC: 32.6 G/DL (ref 30–36.5)
MCV RBC AUTO: 97.7 FL (ref 80–99)
MONOCYTES # BLD: 0.7 K/UL (ref 0–1)
MONOCYTES NFR BLD: 7 % (ref 5–13)
NEUTS SEG # BLD: 5.8 K/UL (ref 1.8–8)
NEUTS SEG NFR BLD: 63 % (ref 32–75)
NITRITE UR QL STRIP.AUTO: NEGATIVE
NRBC # BLD: 0 K/UL (ref 0–0.01)
NRBC BLD-RTO: 0 PER 100 WBC
PH UR STRIP: 7.5 [PH] (ref 5–8)
PLATELET # BLD AUTO: 266 K/UL (ref 150–400)
PMV BLD AUTO: 9.4 FL (ref 8.9–12.9)
POTASSIUM SERPL-SCNC: 3.7 MMOL/L (ref 3.5–5.1)
PROT SERPL-MCNC: 7.5 G/DL (ref 6.4–8.2)
PROT UR STRIP-MCNC: NEGATIVE MG/DL
RBC # BLD AUTO: 3.89 M/UL (ref 3.8–5.2)
RBC #/AREA URNS HPF: NORMAL /HPF (ref 0–5)
SODIUM SERPL-SCNC: 140 MMOL/L (ref 136–145)
SP GR UR REFRACTOMETRY: 1.01 (ref 1–1.03)
TROPONIN I SERPL-MCNC: 0.11 NG/ML
TROPONIN I SERPL-MCNC: 0.14 NG/ML
UA: UC IF INDICATED,UAUC: NORMAL
UROBILINOGEN UR QL STRIP.AUTO: 0.2 EU/DL (ref 0.2–1)
WBC # BLD AUTO: 9.2 K/UL (ref 3.6–11)
WBC URNS QL MICRO: NORMAL /HPF (ref 0–4)

## 2019-03-02 PROCEDURE — 99285 EMERGENCY DEPT VISIT HI MDM: CPT

## 2019-03-02 PROCEDURE — 81001 URINALYSIS AUTO W/SCOPE: CPT

## 2019-03-02 PROCEDURE — 93005 ELECTROCARDIOGRAM TRACING: CPT

## 2019-03-02 PROCEDURE — 74011250636 HC RX REV CODE- 250/636: Performed by: EMERGENCY MEDICINE

## 2019-03-02 PROCEDURE — 74011250637 HC RX REV CODE- 250/637: Performed by: EMERGENCY MEDICINE

## 2019-03-02 PROCEDURE — 84484 ASSAY OF TROPONIN QUANT: CPT

## 2019-03-02 PROCEDURE — 36415 COLL VENOUS BLD VENIPUNCTURE: CPT

## 2019-03-02 PROCEDURE — 83690 ASSAY OF LIPASE: CPT

## 2019-03-02 PROCEDURE — 96360 HYDRATION IV INFUSION INIT: CPT

## 2019-03-02 PROCEDURE — 96361 HYDRATE IV INFUSION ADD-ON: CPT

## 2019-03-02 PROCEDURE — 85025 COMPLETE CBC W/AUTO DIFF WBC: CPT

## 2019-03-02 PROCEDURE — 80053 COMPREHEN METABOLIC PANEL: CPT

## 2019-03-02 PROCEDURE — 96372 THER/PROPH/DIAG INJ SC/IM: CPT

## 2019-03-02 RX ORDER — DIPHENOXYLATE HYDROCHLORIDE AND ATROPINE SULFATE 2.5; .025 MG/1; MG/1
1 TABLET ORAL
Qty: 20 TAB | Refills: 0 | Status: SHIPPED | OUTPATIENT
Start: 2019-03-02 | End: 2020-01-01

## 2019-03-02 RX ORDER — DICYCLOMINE HYDROCHLORIDE 10 MG/ML
20 INJECTION INTRAMUSCULAR
Status: COMPLETED | OUTPATIENT
Start: 2019-03-02 | End: 2019-03-02

## 2019-03-02 RX ORDER — DIPHENOXYLATE HYDROCHLORIDE AND ATROPINE SULFATE 2.5; .025 MG/1; MG/1
1 TABLET ORAL
Status: COMPLETED | OUTPATIENT
Start: 2019-03-02 | End: 2019-03-02

## 2019-03-02 RX ADMIN — SODIUM CHLORIDE 500 ML: 900 INJECTION, SOLUTION INTRAVENOUS at 14:23

## 2019-03-02 RX ADMIN — DICYCLOMINE HYDROCHLORIDE 20 MG: 20 INJECTION, SOLUTION INTRAMUSCULAR at 14:07

## 2019-03-02 RX ADMIN — DIPHENOXYLATE HYDROCHLORIDE AND ATROPINE SULFATE 1 TABLET: 2.5; .025 TABLET ORAL at 14:05

## 2019-03-02 NOTE — ED PROVIDER NOTES
EMERGENCY DEPARTMENT HISTORY AND PHYSICAL EXAM 
 
 
Date: 3/2/2019 Patient Name: Phani Madrid History of Presenting Illness Chief Complaint Patient presents with  Diarrhea Onset today History Provided By: Patient HPI: Phani Madrid, 80 y.o. female with PMHx significant for HTN, CAD, DM, asthma, presents via private vehicle to the ED with cc of RLQ and LLQ abdominal pain and diarrhea x this morning at 9:30AM. Family explains that she left the pt this morning ~ 9:30AM this morning and came back ~ 3 hours later and pt was still in the restroom. Pt denies a hx of C. Diff. She denies taking any OTC medications or other notable modifying factors for her current pain. She specifically denies any fevers, chills, nausea, vomiting, chest pain, shortness of breath, blood in stool, headache, rash, sweating or weight loss. There are no other complaints, changes, or physical findings at this time. Social Hx: - EtOH; - Smoker; - Illicit Drugs PCP: Mercy Condon NP Current Outpatient Medications Medication Sig Dispense Refill  diphenoxylate-atropine (LOMOTIL) 2.5-0.025 mg per tablet Take 1 Tab by mouth four (4) times daily as needed for Diarrhea (1 tab after each stool for max 8 per day). Max Daily Amount: 4 Tabs. Take after each stool for a maximum of 8 tablets daily 20 Tab 0  
 nitroglycerin (NITROSTAT) 0.4 mg SL tablet place 1 tablet under the tongue if needed every 5 minutes for chest pain for 3 doses IF NO RELIEF AFTER 3RD DOSE CALL PRESCRIBER . 25 Tab 1  
 lidocaine 4 % patch Use as directed over affected area 1 Package 0  
 metoprolol tartrate (LOPRESSOR) 25 mg tablet take 1/2 tablet by mouth twice a day 90 Tab 3  
 erythromycin (ILOTYCIN) ophthalmic ointment Apply 1/2 inch ribbon to affected eye three times daily for 5 days 3.5 g 0  
 nystatin (MYCOSTATIN) 100,000 unit/gram ointment Apply  to affected area two (2) times a day.  triamcinolone acetonide (KENALOG) 0.5 % ointment Apply  to affected area two (2) times a day. use thin layer  polyethylene glycol (MIRALAX) 17 gram/dose powder Take 17 g by mouth daily as needed. 1 tablespoon with 8 oz of water daily 119 g 0  
 methocarbamol (ROBAXIN-750) 750 mg tablet Take 1 Tab by mouth four (4) times daily as needed. 20 Tab 0  
 morphine sulfate/PF (MORPHINE, PF,) soln by Intrathecal route continuous. CREAM APPLIED DAILY FOR FOOT PAIN    
 BRILINTA 60 mg tab tablet take 1 tablet by mouth twice a day 60 Tab 11  
 isosorbide mononitrate ER (IMDUR) 30 mg tablet take 1 tablet by mouth once daily TO HELP PREVENT CHEST PAIN 90 Tab 3  
 Cetirizine (ZYRTEC) 10 mg cap Take  by mouth daily.  ticagrelor (BRILINTA) 60 mg tab tablet Take 1 Tab by mouth two (2) times a day. 180 Tab 0  
 doxazosin (CARDURA) 2 mg tablet Take 2 mg by mouth daily. 1  
 magnesium hydroxide (ISIDRO MILK OF MAGNESIA) 400 mg/5 mL suspension Take 30 mL by mouth daily as needed for Constipation.  gabapentin (NEURONTIN) 100 mg capsule Take 2 Caps by mouth three (3) times daily. 180 Cap 1  
 metoprolol tartrate (LOPRESSOR) 25 mg tablet Take 0.5 Tabs by mouth two (2) times a day. 60 Tab 1  
 bumetanide (BUMEX) 1 mg tablet Take 1 Tab by mouth daily. 30 Tab 1  
 magnesium oxide (MAG-OX) 400 mg tablet Take 1 Tab by mouth daily. 30 Tab 1  
 pantoprazole (PROTONIX) 40 mg tablet Take 1 Tab by mouth Daily (before breakfast). 30 Tab 1  
 traMADol (ULTRAM) 50 mg tablet Take 1 Tab by mouth every six (6) hours as needed for Pain. Max Daily Amount: 200 mg. 40 Tab 0  
 ferrous sulfate 325 mg (65 mg iron) tablet Take 1 Tab by mouth two (2) times daily (with meals). 60 Tab 1  
 acetaminophen (TYLENOL) 325 mg tablet Take 1,300 mg by mouth nightly.  dextran 70-hypromellose (ARTIFICIAL TEARS) ophthalmic solution Administer 1 Drop to both eyes as needed.  fluticasone (FLONASE) 50 mcg/actuation nasal spray 2 Sprays by Both Nostrils route daily.  allopurinol (ZYLOPRIM) 100 mg tablet Take  by mouth daily.  aspirin delayed-release 81 mg tablet Take 81 mg by mouth daily.  albuterol (PROAIR HFA) 90 mcg/actuation inhaler Take 2 Puffs by inhalation every six (6) hours as needed for Wheezing. 2 PUFFS 4 TIMES DAILY  pravastatin (PRAVACHOL) 20 mg tablet Take 1 Tab by mouth nightly. 30 Tab 11  
 latanoprost (XALATAN) 0.005 % ophthalmic solution Administer 1 Drop to both eyes nightly. Past History Past Medical History: 
Past Medical History:  
Diagnosis Date  Asthma  CAD (coronary artery disease)  Diabetes ([address]  Hypertension  Irregular heart rate 4/25/13  Morbid obesity ([address]  Other ill-defined conditions(799.89)   
 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 
Past Surgical History:  
Procedure Laterality Date  COLONOSCOPY N/A 8/15/2016 COLONOSCOPY performed by Derik Albarado MD at [facility] ENDOSCOPY  
 HX CHOLECYSTECTOMY  HX CORONARY STENT PLACEMENT    
 HX HEART CATHETERIZATION    
 HX PACEMAKER    
 HX PACEMAKER PLACEMENT Left Family History: No family history on file. Social History: 
Social History Tobacco Use  Smoking status: Never Smoker  Smokeless tobacco: Never Used Substance Use Topics  Alcohol use: No  
 Drug use: No  
 
 
Allergies: Allergies Allergen Reactions  Pcn [Penicillins] Unknown (comments) Review of Systems Review of Systems Constitutional: Negative. Negative for activity change, appetite change, chills, fatigue, fever and unexpected weight change. HENT: Negative. Negative for congestion, hearing loss, rhinorrhea, sneezing and voice change. Eyes: Negative. Negative for pain and visual disturbance. Respiratory: Negative.   Negative for apnea, cough, choking, chest tightness and shortness of breath. Cardiovascular: Negative. Negative for chest pain and palpitations. Gastrointestinal: Positive for abdominal pain and diarrhea. Negative for abdominal distention, blood in stool, nausea and vomiting. Genitourinary: Negative. Negative for difficulty urinating, flank pain, frequency and urgency. No discharge Musculoskeletal: Negative. Negative for arthralgias, back pain, myalgias and neck stiffness. Skin: Negative. Negative for color change and rash. Neurological: Negative. Negative for dizziness, seizures, syncope, speech difficulty, weakness, numbness and headaches. Hematological: Negative for adenopathy. Psychiatric/Behavioral: Negative. Negative for agitation, behavioral problems, dysphoric mood and suicidal ideas. The patient is not nervous/anxious. Physical Exam  
Physical Exam  
Constitutional: She is oriented to person, place, and time. She appears well-developed and well-nourished. No distress. HENT:  
Head: Normocephalic and atraumatic. Mouth/Throat: Oropharynx is clear and moist. No oropharyngeal exudate. Eyes: Conjunctivae and EOM are normal. Pupils are equal, round, and reactive to light. Right eye exhibits no discharge. Left eye exhibits no discharge. Neck: Normal range of motion. Neck supple. Cardiovascular: Normal rate, regular rhythm and intact distal pulses. Exam reveals no gallop and no friction rub. No murmur heard. Pulmonary/Chest: Effort normal and breath sounds normal. No respiratory distress. She has no wheezes. She has no rales. She exhibits no tenderness. Abdominal: Soft. Bowel sounds are normal. She exhibits no distension and no mass. There is no tenderness. There is no rebound and no guarding. Musculoskeletal: Normal range of motion. She exhibits no edema. Lymphadenopathy:  
  She has no cervical adenopathy. Neurological: She is alert and oriented to person, place, and time.  No cranial nerve deficit. Coordination normal.  
Skin: Skin is warm and dry. No rash noted. No erythema. Psychiatric: She has a normal mood and affect. Nursing note and vitals reviewed. Diagnostic Study Results Labs - Recent Results (from the past 12 hour(s)) CBC WITH AUTOMATED DIFF Collection Time: 03/02/19  1:53 PM  
Result Value Ref Range WBC 9.2 3.6 - 11.0 K/uL  
 RBC 3.89 3.80 - 5.20 M/uL  
 HGB 12.4 11.5 - 16.0 g/dL HCT 38.0 35.0 - 47.0 % MCV 97.7 80.0 - 99.0 FL  
 MCH 31.9 26.0 - 34.0 PG  
 MCHC 32.6 30.0 - 36.5 g/dL  
 RDW 14.4 11.5 - 14.5 % PLATELET 000 813 - 050 K/uL MPV 9.4 8.9 - 12.9 FL  
 NRBC 0.0 0  WBC ABSOLUTE NRBC 0.00 0.00 - 0.01 K/uL NEUTROPHILS 63 32 - 75 % LYMPHOCYTES 26 12 - 49 % MONOCYTES 7 5 - 13 % EOSINOPHILS 3 0 - 7 % BASOPHILS 0 0 - 1 % IMMATURE GRANULOCYTES 1 (H) 0.0 - 0.5 % ABS. NEUTROPHILS 5.8 1.8 - 8.0 K/UL  
 ABS. LYMPHOCYTES 2.4 0.8 - 3.5 K/UL  
 ABS. MONOCYTES 0.7 0.0 - 1.0 K/UL  
 ABS. EOSINOPHILS 0.3 0.0 - 0.4 K/UL  
 ABS. BASOPHILS 0.0 0.0 - 0.1 K/UL  
 ABS. IMM. GRANS. 0.1 (H) 0.00 - 0.04 K/UL  
 DF AUTOMATED METABOLIC PANEL, COMPREHENSIVE Collection Time: 03/02/19  1:53 PM  
Result Value Ref Range Sodium 140 136 - 145 mmol/L Potassium 3.7 3.5 - 5.1 mmol/L Chloride 106 97 - 108 mmol/L  
 CO2 27 21 - 32 mmol/L Anion gap 7 5 - 15 mmol/L Glucose 133 (H) 65 - 100 mg/dL BUN 25 (H) 6 - 20 MG/DL Creatinine 0.99 0.55 - 1.02 MG/DL  
 BUN/Creatinine ratio 25 (H) 12 - 20 GFR est AA >60 >60 ml/min/1.73m2 GFR est non-AA 53 (L) >60 ml/min/1.73m2 Calcium 9.9 8.5 - 10.1 MG/DL Bilirubin, total 0.5 0.2 - 1.0 MG/DL  
 ALT (SGPT) 17 12 - 78 U/L  
 AST (SGOT) 20 15 - 37 U/L Alk. phosphatase 112 45 - 117 U/L Protein, total 7.5 6.4 - 8.2 g/dL Albumin 3.4 (L) 3.5 - 5.0 g/dL Globulin 4.1 (H) 2.0 - 4.0 g/dL  A-G Ratio 0.8 (L) 1.1 - 2.2    
TROPONIN I  
 Collection Time: 03/02/19  1:53 PM  
Result Value Ref Range Troponin-I, Qt. 0.14 (H) <0.05 ng/mL LIPASE Collection Time: 03/02/19  1:53 PM  
Result Value Ref Range Lipase 88 73 - 393 U/L  
URINALYSIS W/ REFLEX CULTURE Collection Time: 03/02/19  2:57 PM  
Result Value Ref Range Color YELLOW/STRAW Appearance CLEAR CLEAR Specific gravity 1.009 1.003 - 1.030    
 pH (UA) 7.5 5.0 - 8.0 Protein NEGATIVE  NEG mg/dL Glucose NEGATIVE  NEG mg/dL Ketone NEGATIVE  NEG mg/dL Bilirubin NEGATIVE  NEG Blood NEGATIVE  NEG Urobilinogen 0.2 0.2 - 1.0 EU/dL Nitrites NEGATIVE  NEG Leukocyte Esterase NEGATIVE  NEG    
 WBC 0-4 0 - 4 /hpf  
 RBC 0-5 0 - 5 /hpf Epithelial cells FEW FEW /lpf Bacteria NEGATIVE  NEG /hpf  
 UA:UC IF INDICATED CULTURE NOT INDICATED BY UA RESULT CNI Hyaline cast 0-2 0 - 5 /lpf  
TROPONIN I Collection Time: 03/02/19  5:25 PM  
Result Value Ref Range Troponin-I, Qt. 0.11 (H) <0.05 ng/mL Radiologic Studies - No orders to display CT Results  (Last 48 hours) None CXR Results  (Last 48 hours) None Medical Decision Making I am the first provider for this patient. I reviewed the vital signs, available nursing notes, past medical history, past surgical history, family history and social history. Vital Signs-Reviewed the patient's vital signs. Patient Vitals for the past 12 hrs: 
 Temp Pulse Resp BP SpO2  
03/02/19 1830  60 18 118/74   
03/02/19 1800  71 17 129/55   
03/02/19 1730  65 22 117/60   
03/02/19 1700  67 19 134/77   
03/02/19 1630  66 18 108/65   
03/02/19 1600  66 20 106/57   
03/02/19 1530  60 18 132/61   
03/02/19 1400  63 19 124/75   
03/02/19 1330  67 15 122/64   
03/02/19 1250 97.6 °F (36.4 °C) 66 16 (!) 141/92 96 % Pulse Oximetry Analysis - 96% on RA Cardiac Monitor:  
Rate: 66 bpm 
Rhythm: Normal Sinus Rhythm Records Reviewed: Nursing Notes, Old Medical Records, Previous Radiology Studies and Previous Laboratory Studies Provider Notes (Medical Decision Making): DDx: gastritis, gastroenteritis, pancreatitis, UTI 
 
ED Course:  
Initial assessment performed. The patients presenting problems have been discussed, and they are in agreement with the care plan formulated and outlined with them. I have encouraged them to ask questions as they arise throughout their visit. 
 
6:06 PM 
The patient states that their symptoms have resolved and they feel much better. There are no other new complaints at this time. Her questions have been answered. We are awaiting final results and those will be reviewed with them when they become available. 6:51 PM 
Troponins are chronically elevated. She has no elevated WBC. She is not febrile. No signs/sx's of C. Diff infection. Believe that she is dehydrated and encouraged her to increase PO intake Critical Care Time:  
None Disposition: 
Discharge Note: 
6:54 PM 
The patient has been re-evaluated and is ready for discharge. Reviewed available results with patient. Counseled patient/parent/guardian on diagnosis and care plan. Patient has expressed understanding, and all questions have been answered. Patient agrees with plan and agrees to follow up as recommended, or return to the ED if their symptoms worsen. Discharge instructions have been provided and explained to the patient, along with reasons to return to the ED. PLAN: 
1. Discharge Medication List as of 3/2/2019  6:54 PM  
  
 
2. Follow-up Information Follow up With Specialties Details Why Contact Norm Pritchard NP Family Practice Call in 2 days As needed, If symptoms worsen [address] [phone] Return to ED if worse Diagnosis Clinical Impression: 1. Dehydration 2. Diarrhea, unspecified type This note is prepared by Sherron Garcia. Nora Hernandez, acting as Scribe for [employer] Kelton Coronado, [address] [place] [address] Drive Kelton Coronado MD: The scribe's documentation has been prepared under my direction and personally reviewed by me in its entirety. I confirm that the note above accurately reflects all work, treatment, procedures, and medical decision making performed by me. This note will not be viewable in [address]

## 2019-03-02 NOTE — ED NOTES
Pt reports diarrhea, gagging, and abdominal pain in the RLQ and LLQ since this morning. Pt reports these s/s are indicative of her past 3 Myocardial Infarctions. Pt denies chest pain. Denies SOB.

## 2019-03-02 NOTE — DISCHARGE INSTRUCTIONS
Patient Education        Dehydration: Care Instructions  Your Care Instructions  Dehydration happens when your body loses too much fluid. This might happen when you do not drink enough water or you lose large amounts of fluids from your body because of diarrhea, vomiting, or sweating. Severe dehydration can be life-threatening. Water and minerals called electrolytes help put your body fluids back in balance. Learn the early signs of fluid loss, and drink more fluids to prevent dehydration. Follow-up care is a key part of your treatment and safety. Be sure to make and go to all appointments, and call your doctor if you are having problems. It's also a good idea to know your test results and keep a list of the medicines you take. How can you care for yourself at home? · To prevent dehydration, drink plenty of fluids, enough so that your urine is light yellow or clear like water. Choose water and other caffeine-free clear liquids until you feel better. If you have kidney, heart, or liver disease and have to limit fluids, talk with your doctor before you increase the amount of fluids you drink. · If you do not feel like eating or drinking, try taking small sips of water, sports drinks, or other rehydration drinks. · Get plenty of rest.  To prevent dehydration  · Add more fluids to your diet and daily routine, unless your doctor has told you not to. · During hot weather, drink more fluids. Drink even more fluids if you exercise a lot. Stay away from drinks with alcohol or caffeine. · Watch for the symptoms of dehydration. These include:  ? A dry, sticky mouth. ? Dark yellow urine, and not much of it. ? Dry and sunken eyes. ? Feeling very tired. · Learn what problems can lead to dehydration. These include:  ? Diarrhea, fever, and vomiting. ? Any illness with a fever, such as pneumonia or the flu. ?  Activities that cause heavy sweating, such as endurance races and heavy outdoor work in hot or humid weather. ? Alcohol or drug abuse or withdrawal.  ? Certain medicines, such as cold and allergy pills (antihistamines), diet pills (diuretics), and laxatives. ? Certain diseases, such as diabetes, cancer, and heart or kidney disease. When should you call for help? Call 911 anytime you think you may need emergency care. For example, call if:    · You passed out (lost consciousness).    Call your doctor now or seek immediate medical care if:    · You are confused and cannot think clearly.     · You are dizzy or lightheaded, or you feel like you may faint.     · You have signs of needing more fluids. You have sunken eyes and a dry mouth, and you pass only a little dark urine.     · You cannot keep fluids down.    Watch closely for changes in your health, and be sure to contact your doctor if:    · You are not making tears.     · Your skin is very dry and sags slowly back into place after you pinch it.     · Your mouth and eyes are very dry. Where can you learn more? Go to http://nany-ariella.info/. Enter W084 in the search box to learn more about \"Dehydration: Care Instructions. \"  Current as of: September 23, 2018  Content Version: 11.9  © 2054-8154 [employer]. Care instructions adapted under license by [employer] (which disclaims liability or warranty for this information). If you have questions about a medical condition or this instruction, always ask your healthcare professional. [place] [address] any warranty or liability for your use of this information. Patient Education        Diarrhea: Care Instructions  Your Care Instructions    Diarrhea is loose, watery stools (bowel movements). The exact cause is often hard to find. Sometimes diarrhea is your body's way of getting rid of what caused an upset stomach. Viruses, food poisoning, and many medicines can cause diarrhea.  Some people get diarrhea in response to emotional stress, anxiety, or certain foods. Almost everyone has diarrhea now and then. It usually isn't serious, and your stools will return to normal soon. The important thing to do is replace the fluids you have lost, so you can prevent dehydration. The doctor has checked you carefully, but problems can develop later. If you notice any problems or new symptoms, get medical treatment right away. Follow-up care is a key part of your treatment and safety. Be sure to make and go to all appointments, and call your doctor if you are having problems. It's also a good idea to know your test results and keep a list of the medicines you take. How can you care for yourself at home? · Watch for signs of dehydration, which means your body has lost too much water. Dehydration is a serious condition and should be treated right away. Signs of dehydration are:  ? Increasing thirst and dry eyes and mouth. ? Feeling faint or lightheaded. ? Darker urine, and a smaller amount of urine than normal.  · To prevent dehydration, drink plenty of fluids, enough so that your urine is light yellow or clear like water. Choose water and other caffeine-free clear liquids until you feel better. If you have kidney, heart, or liver disease and have to limit fluids, talk with your doctor before you increase the amount of fluids you drink. · Begin eating small amounts of mild foods the next day, if you feel like it. ? Try yogurt that has live cultures of Lactobacillus. (Check the label.)  ? Avoid spicy foods, fruits, alcohol, and caffeine until 48 hours after all symptoms are gone. ? Avoid chewing gum that contains sorbitol. ? Avoid dairy products (except for yogurt with Lactobacillus) while you have diarrhea and for 3 days after symptoms are gone. · The doctor may recommend that you take over-the-counter medicine, such as loperamide (Imodium), if you still have diarrhea after 6 hours. Read and follow all instructions on the label.  Do not use this medicine if you have bloody diarrhea, a high fever, or other signs of serious illness. Call your doctor if you think you are having a problem with your medicine. When should you call for help? Call 911 anytime you think you may need emergency care. For example, call if:    · You passed out (lost consciousness).     · Your stools are maroon or very bloody.    Call your doctor now or seek immediate medical care if:    · You are dizzy or lightheaded, or you feel like you may faint.     · Your stools are black and look like tar, or they have streaks of blood.     · You have new or worse belly pain.     · You have symptoms of dehydration, such as:  ? Dry eyes and a dry mouth. ? Passing only a little dark urine. ? Feeling thirstier than usual.     · You have a new or higher fever.    Watch closely for changes in your health, and be sure to contact your doctor if:    · Your diarrhea is getting worse.     · You see pus in the diarrhea.     · You are not getting better after 2 days (48 hours). Where can you learn more? Go to http://nany-ariella.info/. Enter A279 in the search box to learn more about \"Diarrhea: Care Instructions. \"  Current as of: September 23, 2018  Content Version: 11.9  © 7746-2736 [employer]. Care instructions adapted under license by [employer] (which disclaims liability or warranty for this information). If you have questions about a medical condition or this instruction, always ask your healthcare professional. [place] [address] any warranty or liability for your use of this information.

## 2019-03-04 LAB
ATRIAL RATE: 416 BPM
CALCULATED R AXIS, ECG10: -54 DEGREES
CALCULATED T AXIS, ECG11: 87 DEGREES
DIAGNOSIS, 93000: NORMAL
P-R INTERVAL, ECG05: 252 MS
Q-T INTERVAL, ECG07: 486 MS
QRS DURATION, ECG06: 170 MS
QTC CALCULATION (BEZET), ECG08: 489 MS
VENTRICULAR RATE, ECG03: 61 BPM

## 2019-03-13 ENCOUNTER — HOSPITAL ENCOUNTER (EMERGENCY)
Age: 84
Discharge: HOME | End: 2019-03-14
Attending: EMERGENCY MEDICINE
Payer: MEDICARE

## 2019-03-13 VITALS
WEIGHT: 150 LBS | OXYGEN SATURATION: 100 % | DIASTOLIC BLOOD PRESSURE: 60 MMHG | SYSTOLIC BLOOD PRESSURE: 130 MMHG | TEMPERATURE: 97.3 F | HEART RATE: 63 BPM | HEIGHT: 57 IN | BODY MASS INDEX: 32.36 KG/M2 | RESPIRATION RATE: 18 BRPM

## 2019-03-13 DIAGNOSIS — R73.9 ELEVATED BLOOD SUGAR: ICD-10-CM

## 2019-03-13 DIAGNOSIS — R77.8 ELEVATED TROPONIN: ICD-10-CM

## 2019-03-13 DIAGNOSIS — R06.02 SOB (SHORTNESS OF BREATH): Primary | ICD-10-CM

## 2019-03-13 LAB
BASOPHILS # BLD: 0 K/UL (ref 0–0.1)
BASOPHILS NFR BLD: 0 % (ref 0–1)
DIFFERENTIAL METHOD BLD: ABNORMAL
EOSINOPHIL # BLD: 0.4 K/UL (ref 0–0.4)
EOSINOPHIL NFR BLD: 5 % (ref 0–7)
ERYTHROCYTE [DISTWIDTH] IN BLOOD BY AUTOMATED COUNT: 14.6 % (ref 11.5–14.5)
HCT VFR BLD AUTO: 40.1 % (ref 35–47)
HGB BLD-MCNC: 13 G/DL (ref 11.5–16)
IMM GRANULOCYTES # BLD AUTO: 0.1 K/UL (ref 0–0.04)
IMM GRANULOCYTES NFR BLD AUTO: 1 % (ref 0–0.5)
LYMPHOCYTES # BLD: 2.1 K/UL (ref 0.8–3.5)
LYMPHOCYTES NFR BLD: 25 % (ref 12–49)
MCH RBC QN AUTO: 31.6 PG (ref 26–34)
MCHC RBC AUTO-ENTMCNC: 32.4 G/DL (ref 30–36.5)
MCV RBC AUTO: 97.3 FL (ref 80–99)
MONOCYTES # BLD: 0.6 K/UL (ref 0–1)
MONOCYTES NFR BLD: 7 % (ref 5–13)
NEUTS SEG # BLD: 5.3 K/UL (ref 1.8–8)
NEUTS SEG NFR BLD: 62 % (ref 32–75)
NRBC # BLD: 0 K/UL (ref 0–0.01)
NRBC BLD-RTO: 0 PER 100 WBC
PLATELET # BLD AUTO: 261 K/UL (ref 150–400)
PMV BLD AUTO: 9.7 FL (ref 8.9–12.9)
RBC # BLD AUTO: 4.12 M/UL (ref 3.8–5.2)
WBC # BLD AUTO: 8.5 K/UL (ref 3.6–11)

## 2019-03-13 PROCEDURE — 80053 COMPREHEN METABOLIC PANEL: CPT

## 2019-03-13 PROCEDURE — 85025 COMPLETE CBC W/AUTO DIFF WBC: CPT

## 2019-03-13 PROCEDURE — 82550 ASSAY OF CK (CPK): CPT

## 2019-03-13 PROCEDURE — 83880 ASSAY OF NATRIURETIC PEPTIDE: CPT

## 2019-03-13 PROCEDURE — 99283 EMERGENCY DEPT VISIT LOW MDM: CPT

## 2019-03-13 PROCEDURE — 84484 ASSAY OF TROPONIN QUANT: CPT

## 2019-03-13 PROCEDURE — 36415 COLL VENOUS BLD VENIPUNCTURE: CPT

## 2019-03-14 ENCOUNTER — APPOINTMENT (OUTPATIENT)
Dept: GENERAL RADIOLOGY | Age: 84
End: 2019-03-14
Attending: EMERGENCY MEDICINE
Payer: MEDICARE

## 2019-03-14 LAB
ALBUMIN SERPL-MCNC: 3.1 G/DL (ref 3.5–5)
ALBUMIN/GLOB SERPL: 0.8 {RATIO} (ref 1.1–2.2)
ALP SERPL-CCNC: 124 U/L (ref 45–117)
ALT SERPL-CCNC: 13 U/L (ref 12–78)
ANION GAP SERPL CALC-SCNC: 10 MMOL/L (ref 5–15)
AST SERPL-CCNC: 15 U/L (ref 15–37)
BILIRUB SERPL-MCNC: 0.2 MG/DL (ref 0.2–1)
BNP SERPL-MCNC: 337 PG/ML
BUN SERPL-MCNC: 30 MG/DL (ref 6–20)
BUN/CREAT SERPL: 22 (ref 12–20)
CALCIUM SERPL-MCNC: 10.1 MG/DL (ref 8.5–10.1)
CHLORIDE SERPL-SCNC: 102 MMOL/L (ref 97–108)
CK SERPL-CCNC: 21 U/L (ref 26–192)
CO2 SERPL-SCNC: 26 MMOL/L (ref 21–32)
CREAT SERPL-MCNC: 1.37 MG/DL (ref 0.55–1.02)
GLOBULIN SER CALC-MCNC: 4 G/DL (ref 2–4)
GLUCOSE SERPL-MCNC: 260 MG/DL (ref 65–100)
POTASSIUM SERPL-SCNC: 3.9 MMOL/L (ref 3.5–5.1)
PROT SERPL-MCNC: 7.1 G/DL (ref 6.4–8.2)
SODIUM SERPL-SCNC: 138 MMOL/L (ref 136–145)
TROPONIN I SERPL-MCNC: 0.09 NG/ML
TROPONIN I SERPL-MCNC: 0.09 NG/ML

## 2019-03-14 PROCEDURE — 84484 ASSAY OF TROPONIN QUANT: CPT

## 2019-03-14 PROCEDURE — 71045 X-RAY EXAM CHEST 1 VIEW: CPT

## 2019-03-14 PROCEDURE — 36415 COLL VENOUS BLD VENIPUNCTURE: CPT

## 2019-03-14 NOTE — DISCHARGE INSTRUCTIONS
Patient Education        Shortness of Breath in Children: Care Instructions  Your Care Instructions  Shortness of breath has many causes. Sometimes conditions such as anxiety can lead to shortness of breath. Some children get mild shortness of breath when they exercise. Trouble breathing also can be a symptom of a serious problem, such as asthma, lung disease, heart problems, and pneumonia. If your child's shortness of breath continues, he or she may need tests and treatment. Watch for any changes in your child's breathing and other symptoms. Follow-up care is a key part of your child's treatment and safety. Be sure to make and go to all appointments, and call your doctor if your child is having problems. It's also a good idea to know your child's test results and keep a list of the medicines your child takes. How can you care for your child at home? · Keep your child away from smoke. Do not smoke or let anyone else smoke around your child or in your house. · Make sure your child gets plenty of rest and sleep. · Have your child take medicines exactly as prescribed. Call your doctor if you think your child is having a problem with his or her medicine. · Help your child find healthy ways to deal with stress. ? Have your child exercise daily. ? Make sure your child gets plenty of sleep. ? Make sure your child eats regularly and well. When should you call for help? Call 911 anytime you think your child may need emergency care. For example, call if:    · Your child has severe trouble breathing. Symptoms may include:  ? Using the belly muscles to breathe. ? The chest sinking in or the nostrils flaring when your child struggles to breathe.    Call your doctor now or seek immediate medical care if:    · Your child's shortness of breath gets worse or your child starts to wheeze.  Wheezing is a high-pitched sound when your child breathes.     · Your child wakes up at night out of breath or has to prop up his or her head on several pillows to breathe.     · Your child is short of breath after only light activity or while at rest.    Watch closely for changes in your child's health, and be sure to contact your doctor if:    · Your child does not get better over the next 1 to 2 days. Where can you learn more? Go to http://nany-ariella.info/. Enter K798 in the search box to learn more about \"Shortness of Breath in Children: Care Instructions. \"  Current as of: September 5, 2018  Content Version: 11.9  © 3285-6240 [employer]. Care instructions adapted under license by [employer] (which disclaims liability or warranty for this information). If you have questions about a medical condition or this instruction, always ask your healthcare professional. [address] any warranty or liability for your use of this information. Patient Education        Learning About High Blood Sugar  What is high blood sugar? Your body turns the food you eat into glucose (sugar), which it uses for energy. But if your body isn't able to use the sugar right away, it can build up in your blood and lead to high blood sugar. When the amount of sugar in your blood stays too high for too much of the time, you may have diabetes. Diabetes is a disease that can cause serious health problems. The good news is that lifestyle changes may help you get your blood sugar back to normal and avoid or delay diabetes. What causes high blood sugar? Sugar (glucose) can build up in your blood if you:  · Are overweight. · Have a family history of diabetes. · Take certain medicines, such as steroids. What are the symptoms? Having high blood sugar may not cause any symptoms at all. Or it may make you feel very thirsty or very hungry. You may also urinate more often than usual, have blurry vision, or lose weight without trying. How is high blood sugar treated?   You can take steps to lower your blood sugar level if you understand what makes it get higher. Your doctor may want you to learn how to test your blood sugar level at home. Then you can see how illness, stress, or different kinds of food or medicine raise or lower your blood sugar level. Other tests may be needed to see if you have diabetes. How can you prevent high blood sugar? · Watch your weight. If you're overweight, losing just a small amount of weight may help. Reducing fat around your waist is most important. · Limit the amount of calories, sweets, and unhealthy fat you eat. Ask your doctor if a dietitian can help you. A registered dietitian can help you create meal plans that fit your lifestyle. · Get at least 30 minutes of exercise on most days of the week. Exercise helps control your blood sugar. It also helps you maintain a healthy weight. Walking is a good choice. You also may want to do other activities, such as running, swimming, cycling, or playing tennis or team sports. · If your doctor prescribed medicines, take them exactly as prescribed. Call your doctor if you think you are having a problem with your medicine. You will get more details on the specific medicines your doctor prescribes. Follow-up care is a key part of your treatment and safety. Be sure to make and go to all appointments, and call your doctor if you are having problems. It's also a good idea to know your test results and keep a list of the medicines you take. Where can you learn more? Go to http://nayn-ariella.info/. Enter O108 in the search box to learn more about \"Learning About High Blood Sugar. \"  Current as of: July 25, 2018  Content Version: 11.9  © 3407-5242 [employer]. Care instructions adapted under license by [employer] (which disclaims liability or warranty for this information).  If you have questions about a medical condition or this instruction, always ask your healthcare professional. [employer] disclaims any warranty or liability for your use of this information.

## 2019-03-14 NOTE — ED PROVIDER NOTES
EMERGENCY DEPARTMENT HISTORY AND PHYSICAL EXAM      Date: 3/13/2019  Patient Name: Darby Chamberlain    History of Presenting Illness     Chief Complaint   Patient presents with    Shortness of Breath       History Provided By: Patient and EMS    HPI: Darby Chamberlain, 80 y.o. female with PMHx significant for HTN, CAD, DM, asthma, irregular heart rate, and s/p pacemaker, presents via EMS to the ED with cc of new onset SOB x 1 hour pta. EMS notes that the pt has had 2 prior NSTEMI's and note that the pt did not have a STEMI on EKG pta. Pt denies any CP and notes that she did not experience CP with her previous MI. EMS notes that the pts BP was 130/79, HR of 61 and was 100% on 2 L O2 nc and 95% on RA. EMS notes that the pt ambulates with a walker at home. Pt denies any modifying factors. She denies any associated sxs. She specifically denies any fevers, chills, nausea, vomiting, chest pain, headache, rash, or diarrhea. There are no other complaints, changes, or physical findings at this time. PCP: Cheyenne Enriquez NP    No current facility-administered medications on file prior to encounter. Current Outpatient Medications on File Prior to Encounter   Medication Sig Dispense Refill    diphenoxylate-atropine (LOMOTIL) 2.5-0.025 mg per tablet Take 1 Tab by mouth four (4) times daily as needed for Diarrhea (1 tab after each stool for max 8 per day). Max Daily Amount: 4 Tabs.  Take after each stool for a maximum of 8 tablets daily 20 Tab 0    nitroglycerin (NITROSTAT) 0.4 mg SL tablet place 1 tablet under the tongue if needed every 5 minutes for chest pain for 3 doses IF NO RELIEF AFTER 3RD DOSE CALL PRESCRIBER . 25 Tab 1    lidocaine 4 % patch Use as directed over affected area 1 Package 0    metoprolol tartrate (LOPRESSOR) 25 mg tablet take 1/2 tablet by mouth twice a day 90 Tab 3    erythromycin (ILOTYCIN) ophthalmic ointment Apply 1/2 inch ribbon to affected eye three times daily for 5 days 3.5 g 0    nystatin (MYCOSTATIN) 100,000 unit/gram ointment Apply  to affected area two (2) times a day.  triamcinolone acetonide (KENALOG) 0.5 % ointment Apply  to affected area two (2) times a day. use thin layer      polyethylene glycol (MIRALAX) 17 gram/dose powder Take 17 g by mouth daily as needed. 1 tablespoon with 8 oz of water daily 119 g 0    methocarbamol (ROBAXIN-750) 750 mg tablet Take 1 Tab by mouth four (4) times daily as needed. 20 Tab 0    morphine sulfate/PF (MORPHINE, PF,) soln by Intrathecal route continuous. CREAM APPLIED DAILY FOR FOOT PAIN      BRILINTA 60 mg tab tablet take 1 tablet by mouth twice a day 60 Tab 11    isosorbide mononitrate ER (IMDUR) 30 mg tablet take 1 tablet by mouth once daily TO HELP PREVENT CHEST PAIN 90 Tab 3    Cetirizine (ZYRTEC) 10 mg cap Take  by mouth daily.  ticagrelor (BRILINTA) 60 mg tab tablet Take 1 Tab by mouth two (2) times a day. 180 Tab 0    doxazosin (CARDURA) 2 mg tablet Take 2 mg by mouth daily. 1    magnesium hydroxide (ISIDRO MILK OF MAGNESIA) 400 mg/5 mL suspension Take 30 mL by mouth daily as needed for Constipation.  gabapentin (NEURONTIN) 100 mg capsule Take 2 Caps by mouth three (3) times daily. 180 Cap 1    metoprolol tartrate (LOPRESSOR) 25 mg tablet Take 0.5 Tabs by mouth two (2) times a day. 60 Tab 1    bumetanide (BUMEX) 1 mg tablet Take 1 Tab by mouth daily. 30 Tab 1    magnesium oxide (MAG-OX) 400 mg tablet Take 1 Tab by mouth daily. 30 Tab 1    pantoprazole (PROTONIX) 40 mg tablet Take 1 Tab by mouth Daily (before breakfast). 30 Tab 1    traMADol (ULTRAM) 50 mg tablet Take 1 Tab by mouth every six (6) hours as needed for Pain. Max Daily Amount: 200 mg. 40 Tab 0    ferrous sulfate 325 mg (65 mg iron) tablet Take 1 Tab by mouth two (2) times daily (with meals). 60 Tab 1    acetaminophen (TYLENOL) 325 mg tablet Take 1,300 mg by mouth nightly.       dextran 70-hypromellose (ARTIFICIAL TEARS) ophthalmic solution Administer 1 Drop to both eyes as needed.  fluticasone (FLONASE) 50 mcg/actuation nasal spray 2 Sprays by Both Nostrils route daily.  allopurinol (ZYLOPRIM) 100 mg tablet Take  by mouth daily.  aspirin delayed-release 81 mg tablet Take 81 mg by mouth daily.  albuterol (PROAIR HFA) 90 mcg/actuation inhaler Take 2 Puffs by inhalation every six (6) hours as needed for Wheezing. 2 PUFFS 4 TIMES DAILY      pravastatin (PRAVACHOL) 20 mg tablet Take 1 Tab by mouth nightly. 30 Tab 11    latanoprost (XALATAN) 0.005 % ophthalmic solution Administer 1 Drop to both eyes nightly. 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 Social History:  Social History     Tobacco Use    Smoking status: Never Smoker    Smokeless tobacco: Never Used   Substance Use Topics    Alcohol use: No    Drug use: No       Allergies: Allergies   Allergen Reactions    Pcn [Penicillins] Unknown (comments)         Review of Systems   Review of Systems   Constitutional: Negative for activity change, appetite change, fatigue and fever. HENT: Negative. Negative for congestion, rhinorrhea and sore throat. Respiratory: Positive for shortness of breath. Negative for cough and wheezing. Cardiovascular: Negative.   Negative for chest pain and leg swelling. Gastrointestinal: Negative. Negative for abdominal distention, abdominal pain, constipation, diarrhea, nausea and vomiting. Endocrine: Negative. Genitourinary: Negative for difficulty urinating, dysuria, menstrual problem, vaginal bleeding and vaginal discharge. Musculoskeletal: Negative. Negative for arthralgias, joint swelling and myalgias. Skin: Negative. Negative for rash. Neurological: Negative. Negative for dizziness, weakness, light-headedness and headaches. Psychiatric/Behavioral: Negative. Physical Exam   Physical Exam   Constitutional: She is oriented to person, place, and time. She appears well-developed and well-nourished. HENT:   Head: Atraumatic. Eyes: EOM are normal.   Cardiovascular: Normal rate, regular rhythm, normal heart sounds and intact distal pulses. Exam reveals no gallop and no friction rub. No murmur heard. Pulmonary/Chest: Effort normal. No respiratory distress. She has no wheezes. She has rhonchi (Slight L sided predominance). She has no rales. She exhibits no tenderness. Not hypoxic, no increased WOB    Abdominal: Soft. Bowel sounds are normal. She exhibits no distension and no mass. There is no tenderness. There is no rebound and no guarding. Musculoskeletal: Normal range of motion. She exhibits no edema or tenderness. Neurological: She is oriented to person, place, and time. Skin: Skin is warm. Psychiatric: She has a normal mood and affect. Nursing note and vitals reviewed. Diagnostic Study Results     Labs -     Recent Results (from the past 12 hour(s))   TROPONIN I    Collection Time: 03/14/19  1:51 AM   Result Value Ref Range    Troponin-I, Qt. 0.09 (H) <0.05 ng/mL       Radiologic Studies -   XR CHEST SNGL V   Final Result   IMPRESSION: No acute process. CXR Results  (Last 48 hours)               03/14/19 0122  XR CHEST SNGL V Final result    Impression:  IMPRESSION: No acute process.             Narrative: EXAM:  CR chest single view       INDICATION: Shortness of breath       COMPARISON: 11/13/2018. TECHNIQUE: Portable AP upright chest view at 0052 hours       FINDINGS: The left chest ICD and wires are stable. Cardiac monitoring wires   overlie the thorax. The cardiomediastinal contours are stable. The lungs and   pleural spaces are clear. There is no pneumothorax. The bones and upper abdomen   are stable. Medical Decision Making   I am the first provider for this patient. I reviewed the vital signs, available nursing notes, past medical history, past surgical history, family history and social history. Vital Signs-Reviewed the patient's vital signs. Patient Vitals for the past 12 hrs:   Temp Pulse Resp BP SpO2   03/13/19 2334 97.3 °F (36.3 °C) 63 18 130/60 100 %       Pulse Oximetry Analysis - 100% on room air    Cardiac Monitor:   Rate: 63 bpm  Rhythm: Normal Sinus Rhythm    Records Reviewed: Nursing Notes, Old Medical Records, Ambulance Run Sheet, Previous Radiology Studies and Previous Laboratory Studies    Provider Notes (Medical Decision Making): H/O ACS so MI cannot be excluded, CHF, volume overload, CAP, viral illness, without evidence of respiratory failure with hypoxia. Will perform cardiac work up while evaluating for other possible etiologies listed above. ED Course:   Initial assessment performed. The patients presenting problems have been discussed, and they are in agreement with the care plan formulated and outlined with them. I have encouraged them to ask questions as they arise throughout their visit. ED Course as of Mar 14 0344   Thu Mar 14, 2019   0153 Pt updated on lab findings, pt remains asymptomatic, not tachycardic, no oxygen requirement. Repeating troponin, pt has chronically elevated troponin, will ensure with repeat that it is not on an upward trend and just represents her chronic mild elevation.   [RS]   8905 Pt has had no recurrence of SOB, no CP, family and pt feel comfortable being discharged home. [RS]      ED Course User Index  [RS] Pasha GEORGES       Critical Care Time:   0 minutes. Disposition:  DISCHARGE NOTE:  3:43 AM  The patient is ready for discharge. The patients signs, symptoms, diagnosis, and instructions for discharge have been discussed and the pt has conveyed their understanding. The patient is to follow up as recommended with PCP or return to the ER should their symptoms worsen. Plan has been discussed and patient has conveyed their agreement. PLAN:  1. Discharge home. Current Discharge Medication List        2. Follow-up Information     Follow up With Specialties Details Why Contact Info    Tran Flores NP Family Practice In 1 day  [facility]       Brady Davis MD Cardiology, [address] [facility] Vascular Surgery, Internal Medicine In 3 days  [address]  [address] [address] [phone]      [facility] EMERGENCY DEPT Emergency Medicine  As needed, If symptoms worsen/RECUR [facility]  [address] [facility]  [phone]        Return to ED if worse     Diagnosis     Clinical Impression:   1. SOB (shortness of breath)    2. Elevated troponin    3. Elevated blood sugar        Attestations: This note is prepared by Melany Huntley, acting as Scribe for Troy Wagner MD.    Troy Wagner MD: The scribe's documentation has been prepared under my direction and personally reviewed by me in its entirety. I confirm that the note above accurately reflects all work, treatment, procedures, and medical decision making performed by me. This note will not be viewable in [address]

## 2019-03-14 NOTE — ED TRIAGE NOTES
PT arrives via EMS with complaint of SOB. Pt A&Ox4, no distress noted. VSS. PT speaks in complete sentences, laughs and smiles with staff. PT states her daughter called 911. No accessory muscle use noted by Sury Guy RN.

## 2019-03-18 ENCOUNTER — OFFICE VISIT (OUTPATIENT)
Dept: CARDIOLOGY CLINIC | Age: 84
End: 2019-03-18

## 2019-03-18 VITALS
SYSTOLIC BLOOD PRESSURE: 148 MMHG | WEIGHT: 149.8 LBS | OXYGEN SATURATION: 98 % | HEART RATE: 68 BPM | HEIGHT: 57 IN | RESPIRATION RATE: 18 BRPM | DIASTOLIC BLOOD PRESSURE: 84 MMHG | BODY MASS INDEX: 32.32 KG/M2

## 2019-03-18 DIAGNOSIS — I44.1 MOBITZ TYPE II ATRIOVENTRICULAR BLOCK: ICD-10-CM

## 2019-03-18 DIAGNOSIS — I10 ESSENTIAL HYPERTENSION: ICD-10-CM

## 2019-03-18 DIAGNOSIS — Z95.0 PACEMAKER: ICD-10-CM

## 2019-03-18 DIAGNOSIS — I25.10 CORONARY ARTERY DISEASE INVOLVING NATIVE CORONARY ARTERY OF NATIVE HEART WITHOUT ANGINA PECTORIS: Primary | ICD-10-CM

## 2019-03-18 DIAGNOSIS — I50.32 CHRONIC DIASTOLIC HEART FAILURE (HCC): ICD-10-CM

## 2019-03-18 RX ORDER — BUSPIRONE HYDROCHLORIDE 5 MG/1
5 TABLET ORAL 2 TIMES DAILY
Refills: 0 | COMMUNITY
Start: 2019-02-16

## 2019-03-18 RX ORDER — POTASSIUM CHLORIDE 750 MG/1
TABLET, FILM COATED, EXTENDED RELEASE ORAL
Refills: 1 | COMMUNITY
Start: 2019-03-17

## 2019-03-18 NOTE — PROGRESS NOTES
Chief Complaint   Patient presents with    Shortness of Breath     [facility]no sob since - no cardiac symptoms at this time      1. Have you been to the ER, urgent care clinic since your last visit? Hospitalized since your last visit? No    2. Have you seen or consulted any other health care providers outside of the [facility] since your last visit? Include any pap smears or colon screening.    Yes Dr Blaine Armas

## 2019-03-18 NOTE — PROGRESS NOTES
Subjective/HPI:     Ms. Rubie Crigler is a 80 y.o. female is here for ER f/u. She has a PMHx of CAD, DM2, HTN, HLD and obesity. She was seen in the ER recently for complaints of shortness of breath. Her troponins were noted to be mildly elevated, and she was recommended to follow-up with us for further management. Of note, she has chronically elevated troponins, always in the 0.09-0.14 range. Her shortness of breath has been chronic and unchanged from previous visits. This is improved with use of her inhaler. She was referred to pulmonary, but was unable to complete PFTs due to difficulties with effort in FEV1 calculations. She denies chest pain symptoms, orthopnea, PND or edema. PCP Provider  Mercy Condon NP  Past Medical History:   Diagnosis Date    Asthma     CAD (coronary artery disease)     Diabetes ([address]     Hypertension     Irregular heart rate 4/25/13    Morbid obesity ([address]     Other ill-defined conditions(799.89)     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   Procedure Laterality Date    COLONOSCOPY N/A 8/15/2016    COLONOSCOPY performed [facility]    HX CHOLECYSTECTOMY      HX CORONARY STENT PLACEMENT      HX HEART CATHETERIZATION      HX PACEMAKER      HX PACEMAKER PLACEMENT Left      No family history on file.   Social History     Socioeconomic History    Marital status:      Spouse name: Not on file    Number of children: Not on file    Years of education: Not on file    Highest education level: Not on file   Social Needs    Financial resource strain: Not on file    Food insecurity - worry: Not on file    Food insecurity - inability: Not on file    Transportation needs - medical: Not on file   doubleTwist needs - non-medical: Not on file   Occupational History    Not on file   Tobacco Use    Smoking status: Never Smoker    Smokeless tobacco: Never Used   Substance and Sexual Activity    Alcohol use: No    Drug use: No    Sexual activity: Yes     Partners: Male   Other Topics Concern    Not on file   Social History Narrative    Not on file       Allergies   Allergen Reactions    Pcn [Penicillins] Unknown (comments)        Current Outpatient Medications   Medication Sig    busPIRone (BUSPAR) 5 mg tablet     potassium chloride SR (KLOR-CON 10) 10 mEq tablet     diphenoxylate-atropine (LOMOTIL) 2.5-0.025 mg per tablet Take 1 Tab by mouth four (4) times daily as needed for Diarrhea (1 tab after each stool for max 8 per day). Max Daily Amount: 4 Tabs. Take after each stool for a maximum of 8 tablets daily    nitroglycerin (NITROSTAT) 0.4 mg SL tablet place 1 tablet under the tongue if needed every 5 minutes for chest pain for 3 doses IF NO RELIEF AFTER 3RD DOSE CALL PRESCRIBER .  metoprolol tartrate (LOPRESSOR) 25 mg tablet take 1/2 tablet by mouth twice a day    erythromycin (ILOTYCIN) ophthalmic ointment Apply 1/2 inch ribbon to affected eye three times daily for 5 days    nystatin (MYCOSTATIN) 100,000 unit/gram ointment Apply  to affected area two (2) times a day.  triamcinolone acetonide (KENALOG) 0.5 % ointment Apply  to affected area two (2) times a day. use thin layer    polyethylene glycol (MIRALAX) 17 gram/dose powder Take 17 g by mouth daily as needed. 1 tablespoon with 8 oz of water daily    methocarbamol (ROBAXIN-750) 750 mg tablet Take 1 Tab by mouth four (4) times daily as needed.  morphine sulfate/PF (MORPHINE, PF,) soln by Intrathecal route continuous. CREAM APPLIED DAILY FOR FOOT PAIN    BRILINTA 60 mg tab tablet take 1 tablet by mouth twice a day    isosorbide mononitrate ER (IMDUR) 30 mg tablet take 1 tablet by mouth once daily TO HELP PREVENT CHEST PAIN    Cetirizine (ZYRTEC) 10 mg cap Take  by mouth daily.  ticagrelor (BRILINTA) 60 mg tab tablet Take 1 Tab by mouth two (2) times a day.     doxazosin (CARDURA) 2 mg tablet Take 2 mg by mouth daily.  gabapentin (NEURONTIN) 100 mg capsule Take 2 Caps by mouth three (3) times daily.  metoprolol tartrate (LOPRESSOR) 25 mg tablet Take 0.5 Tabs by mouth two (2) times a day.  bumetanide (BUMEX) 1 mg tablet Take 1 Tab by mouth daily. (Patient taking differently: Take 1 mg by mouth daily. 1 and 1/2 daily)    magnesium oxide (MAG-OX) 400 mg tablet Take 1 Tab by mouth daily.  pantoprazole (PROTONIX) 40 mg tablet Take 1 Tab by mouth Daily (before breakfast).  traMADol (ULTRAM) 50 mg tablet Take 1 Tab by mouth every six (6) hours as needed for Pain. Max Daily Amount: 200 mg.  ferrous sulfate 325 mg (65 mg iron) tablet Take 1 Tab by mouth two (2) times daily (with meals).  acetaminophen (TYLENOL) 325 mg tablet Take 1,300 mg by mouth nightly.  dextran 70-hypromellose (ARTIFICIAL TEARS) ophthalmic solution Administer 1 Drop to both eyes as needed.  fluticasone (FLONASE) 50 mcg/actuation nasal spray 2 Sprays by Both Nostrils route daily.  allopurinol (ZYLOPRIM) 100 mg tablet Take  by mouth daily.  aspirin delayed-release 81 mg tablet Take 81 mg by mouth daily.  albuterol (PROAIR HFA) 90 mcg/actuation inhaler Take 2 Puffs by inhalation every six (6) hours as needed for Wheezing. 2 PUFFS 4 TIMES DAILY    pravastatin (PRAVACHOL) 20 mg tablet Take 1 Tab by mouth nightly.  latanoprost (XALATAN) 0.005 % ophthalmic solution Administer 1 Drop to both eyes nightly.  lidocaine 4 % patch Use as directed over affected area    magnesium hydroxide (ISIDRO MILK OF MAGNESIA) 400 mg/5 mL suspension Take 30 mL by mouth daily as needed for Constipation. No current facility-administered medications for this visit. I have reviewed the problem list, allergy list, medical history, family, social history and medications. Review of Symptoms:    Review of Systems   Constitutional: Negative for chills, fever and weight loss.    HENT: Negative for nosebleeds. Eyes: Negative for blurred vision and double vision. Respiratory: Negative for cough, shortness of breath and wheezing. Cardiovascular: Negative for chest pain, palpitations, orthopnea, leg swelling and PND. Gastrointestinal: Negative for abdominal pain, blood in stool, diarrhea, nausea and vomiting. Musculoskeletal: Negative for joint pain. Skin: Negative for rash. Neurological: Negative for dizziness, tingling and loss of consciousness. Endo/Heme/Allergies: Does not bruise/bleed easily. Physical Exam:      General: Well developed, in no acute distress, cooperative and alert  HEENT: No carotid bruits, no JVD, trach is midline. Neck Supple, PEERL, EOM intact. Heart:  reg rate and rhythm; normal S1/S2; no murmurs, gallops or rubs.   Respiratory: Clear bilaterally x 4, no wheezing or rales  Abdomen:   Soft, non-tender, no distention, no masses. + BS.   Extremities:  Normal cap refill, no cyanosis, atraumatic. Musculoskeletal: right foot in foot brace; ambulates using wheelch1+ BLE edema which is chronicair  Neuro: A&Ox3, speech clear, gait stable. Skin: Skin color is normal. No rashes or lesions.  Non diaphoretic  Vascular: 2+ pulses symmetric in all extremities    Vitals:    03/18/19 1503 03/18/19 1514   BP: 142/80 148/84   Pulse: 68    Resp: 18    SpO2: 98%    Weight: 149 lb 12.8 oz (67.9 kg)    Height: 4' 9\" (1.448 m)        Cardiographics    ECG: AV paced rhythm  Results for orders placed or performed during the hospital encounter of 11/13/18   EKG, 12 LEAD, INITIAL   Result Value Ref Range    Ventricular Rate 69 BPM    Atrial Rate 69 BPM    P-R Interval 224 ms    QRS Duration 72 ms    Q-T Interval 352 ms    QTC Calculation (Bezet) 377 ms    Calculated R Axis -18 degrees    Calculated T Axis -21 degrees    Diagnosis       AV dual-paced rhythm with prolonged AV conduction  with intermittent ventricular pacing  Confirmed by Jesse Marley ([id number]) on 11/13/2018 3:41:42 PM Cardiology Labs:  Lab Results   Component Value Date/Time    Cholesterol, total 128 03/28/2017 10:46 AM    HDL Cholesterol 43 03/28/2017 10:46 AM    LDL, calculated 70 03/28/2017 10:46 AM    Triglyceride 76 03/28/2017 10:46 AM    CHOL/HDL Ratio 2.5 01/05/2015 02:25 AM       Lab Results   Component Value Date/Time    Sodium 138 03/13/2019 11:40 AM    Potassium 3.9 03/13/2019 11:40 AM    Chloride 102 03/13/2019 11:40 AM    CO2 26 03/13/2019 11:40 AM    Anion gap 10 03/13/2019 11:40 AM    Glucose 260 (H) 03/13/2019 11:40 AM    BUN 30 (H) 03/13/2019 11:40 AM    Creatinine 1.37 (H) 03/13/2019 11:40 AM    BUN/Creatinine ratio 22 (H) 03/13/2019 11:40 AM    GFR est AA 44 (L) 03/13/2019 11:40 AM    GFR est non-AA 36 (L) 03/13/2019 11:40 AM    Calcium 10.1 03/13/2019 11:40 AM    Bilirubin, total 0.2 03/13/2019 11:40 AM    AST (SGOT) 15 03/13/2019 11:40 AM    Alk. phosphatase 124 (H) 03/13/2019 11:40 AM    Protein, total 7.1 03/13/2019 11:40 AM    Albumin 3.1 (L) 03/13/2019 11:40 AM    Globulin 4.0 03/13/2019 11:40 AM    A-G Ratio 0.8 (L) 03/13/2019 11:40 AM    ALT (SGPT) 13 03/13/2019 11:40 AM           Assessment:     Assessment:       ICD-10-CM ICD-9-CM    1. Coronary artery disease involving native coronary artery of native heart without angina pectoris I25.10 414.01    2. Essential hypertension I10 401.9 AMB POC EKG ROUTINE W/ 12 LEADS, INTER & REP   3. Chronic diastolic heart failure (HCC) I50.32 428.32    4. Mobitz type II atrioventricular block I44.1 426.12    5. Pacemaker Z95.0 V45.01         Plan:     1. Coronary artery disease involving native coronary artery of native heart without angina pectoris  Stable; no anginal or anginal equivalent symptoms. Troponins are chronically elevated; cardiac cath in 3/2016 with patent LAD, D2 and RCA stents without evidence of worsening CAD or in-stent restenosis.   Would continue with conservative management; her shortness of breath is not cardiac in etiology given its improvement with use of PRN albuterol inhaler. 2. Essential hypertension  BP controlled; continue with anti-hypertensive therapy and low sodium diet. - AMB POC EKG ROUTINE W/ 12 LEADS, INTER & REP    3. Chronic diastolic heart failure ([address]  Echo in 1/2015 with preserved LVEF 55-60% with grade 1 DD; euvolemic on exam today. Continue with medical management. 4. Mobitz type II atrioventricular block  S/p PPM placement.   Continue with routine device interrogations in device clinic with Dr. Clif Gudino.    5.  Dinah Nation NP

## 2019-04-18 ENCOUNTER — OFFICE VISIT (OUTPATIENT)
Dept: CARDIOLOGY CLINIC | Age: 84
End: 2019-04-18

## 2019-04-18 DIAGNOSIS — I49.5 SICK SINUS SYNDROME (HCC): ICD-10-CM

## 2019-04-18 DIAGNOSIS — Z95.0 PACEMAKER: Primary | ICD-10-CM

## 2019-04-18 DIAGNOSIS — I44.1 MOBITZ TYPE II ATRIOVENTRICULAR BLOCK: ICD-10-CM

## 2019-04-30 DIAGNOSIS — I25.10 CORONARY ARTERY DISEASE INVOLVING NATIVE CORONARY ARTERY OF NATIVE HEART WITHOUT ANGINA PECTORIS: ICD-10-CM

## 2019-05-01 RX ORDER — TICAGRELOR 60 MG/1
TABLET ORAL
Qty: 60 TAB | Refills: 11 | Status: SHIPPED | OUTPATIENT
Start: 2019-05-01 | End: 2020-01-01

## 2019-06-10 RX ORDER — ISOSORBIDE MONONITRATE 30 MG/1
TABLET, EXTENDED RELEASE ORAL
Qty: 90 TAB | Refills: 3 | Status: SHIPPED | OUTPATIENT
Start: 2019-06-10 | End: 2020-01-01

## 2019-07-25 ENCOUNTER — CLINICAL SUPPORT (OUTPATIENT)
Dept: CARDIOLOGY CLINIC | Age: 84
End: 2019-07-25

## 2019-07-25 ENCOUNTER — OFFICE VISIT (OUTPATIENT)
Dept: CARDIOLOGY CLINIC | Age: 84
End: 2019-07-25

## 2019-07-25 VITALS
HEART RATE: 60 BPM | WEIGHT: 148 LBS | HEIGHT: 57 IN | OXYGEN SATURATION: 95 % | SYSTOLIC BLOOD PRESSURE: 108 MMHG | DIASTOLIC BLOOD PRESSURE: 70 MMHG | BODY MASS INDEX: 31.93 KG/M2 | RESPIRATION RATE: 18 BRPM

## 2019-07-25 DIAGNOSIS — I49.5 SICK SINUS SYNDROME (HCC): Primary | ICD-10-CM

## 2019-07-25 DIAGNOSIS — I49.5 SICK SINUS SYNDROME (HCC): ICD-10-CM

## 2019-07-25 DIAGNOSIS — I44.1 MOBITZ TYPE II ATRIOVENTRICULAR BLOCK: ICD-10-CM

## 2019-07-25 DIAGNOSIS — Z95.0 CARDIAC PACEMAKER IN SITU: Primary | ICD-10-CM

## 2019-07-25 DIAGNOSIS — Z95.0 PACEMAKER: ICD-10-CM

## 2019-07-25 DIAGNOSIS — I10 ESSENTIAL HYPERTENSION: ICD-10-CM

## 2019-07-25 DIAGNOSIS — I25.10 CORONARY ARTERY DISEASE INVOLVING NATIVE CORONARY ARTERY OF NATIVE HEART WITHOUT ANGINA PECTORIS: ICD-10-CM

## 2019-07-25 RX ORDER — MIRTAZAPINE 15 MG/1
TABLET, FILM COATED ORAL
Refills: 0 | COMMUNITY
Start: 2019-06-30 | End: 2019-07-25

## 2019-07-25 NOTE — PROGRESS NOTES
Subjective:      Chevy Garcia is a 80 y.o. female is here for annual device follow up and follow up of her AF. She denies cardiac complaints today. The patient denies chest pain/ shortness of breath, orthopnea, PND, LE edema, palpitations, syncope, presyncope or fatigue.        Patient Active Problem List    Diagnosis Date Noted    Severe obesity (BMI 35.0-39.9) 06/14/2018    Chronic diastolic heart failure ([address] 08/14/2016    Acute blood loss anemia 08/13/2016    GI bleed 08/13/2016    Elevated troponin 08/13/2016    CKD (chronic kidney disease) stage 3, GFR 30-59 ml/min ([facility]) 08/13/2016    Coronary artery disease involving native coronary artery without angina pectoris 11/12/2015    S/P PTCA (percutaneous transluminal coronary angioplasty) 04/09/2015    Pacemaker 03/09/2015    Mobitz type II atrioventricular block 03/07/2015    Syncope and collapse 03/06/2015    CAD (coronary artery disease), native coronary artery 01/03/2015    Chest pain 08/14/2014    NSTEMI (non-ST elevated myocardial infarction) ([address] 04/07/2013    Other nonspecific abnormal serum enzyme levels 04/06/2013    Sick sinus syndrome ([address] 04/06/2013    Dehydration 04/06/2013    ACS (acute coronary syndrome) ([address] 04/06/2013    Hypertension 04/06/2013      Christiano Blankenship NP  Past Medical History:   Diagnosis Date    Asthma     CAD (coronary artery disease)     [facility] ([address]     Hypertension     Irregular heart rate 4/25/13    Morbid obesity ([address]     Other ill-defined conditions(799.89)     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   Procedure Laterality Date    COLONOSCOPY N/A 8/15/2016    COLONOSCOPY performed by Jose Armando Gupta MD at [facility] ENDOSCOPY    HX CHOLECYSTECTOMY      HX CORONARY STENT PLACEMENT      HX HEART CATHETERIZATION      HX PACEMAKER      HX PACEMAKER PLACEMENT Left      Allergies   Allergen Reactions    Pcn [Penicillins] Unknown (comments)      History reviewed. No pertinent family history. negative for cardiac disease  Social History     Socioeconomic History    Marital status:      Spouse name: Not on file    Number of children: Not on file    Years of education: Not on file    Highest education level: Not on file   Tobacco Use    Smoking status: Never Smoker    Smokeless tobacco: Never Used   Substance and Sexual Activity    Alcohol use: No    Drug use: No    Sexual activity: Yes     Partners: Male     Current Outpatient Medications   Medication Sig    isosorbide mononitrate ER (IMDUR) 30 mg tablet take 1 tablet by mouth once daily TO HELP PEVENT CHEST PAINS    BRILINTA 60 mg tab tablet take 1 tablet by mouth twice a day    busPIRone (BUSPAR) 5 mg tablet     potassium chloride SR (KLOR-CON 10) 10 mEq tablet     diphenoxylate-atropine (LOMOTIL) 2.5-0.025 mg per tablet Take 1 Tab by mouth four (4) times daily as needed for Diarrhea (1 tab after each stool for max 8 per day). Max Daily Amount: 4 Tabs. Take after each stool for a maximum of 8 tablets daily    nitroglycerin (NITROSTAT) 0.4 mg SL tablet place 1 tablet under the tongue if needed every 5 minutes for chest pain for 3 doses IF NO RELIEF AFTER 3RD DOSE CALL PRESCRIBER .  nystatin (MYCOSTATIN) 100,000 unit/gram ointment Apply  to affected area two (2) times a day.  triamcinolone acetonide (KENALOG) 0.5 % ointment Apply  to affected area two (2) times a day. use thin layer    polyethylene glycol (MIRALAX) 17 gram/dose powder Take 17 g by mouth daily as needed. 1 tablespoon with 8 oz of water daily    morphine sulfate/PF (MORPHINE, PF,) soln by Intrathecal route continuous. CREAM APPLIED DAILY FOR FOOT PAIN    Cetirizine (ZYRTEC) 10 mg cap Take  by mouth daily.  doxazosin (CARDURA) 2 mg tablet Take 2 mg by mouth daily.  gabapentin (NEURONTIN) 100 mg capsule Take 2 Caps by mouth three (3) times daily.     metoprolol tartrate (LOPRESSOR) 25 mg tablet Take 0.5 Tabs by mouth two (2) times a day.  bumetanide (BUMEX) 1 mg tablet Take 1 Tab by mouth daily. (Patient taking differently: Take 1 mg by mouth daily. 1 and 1/2 daily)    magnesium oxide (MAG-OX) 400 mg tablet Take 1 Tab by mouth daily.  pantoprazole (PROTONIX) 40 mg tablet Take 1 Tab by mouth Daily (before breakfast).  traMADol (ULTRAM) 50 mg tablet Take 1 Tab by mouth every six (6) hours as needed for Pain. Max Daily Amount: 200 mg.  ferrous sulfate 325 mg (65 mg iron) tablet Take 1 Tab by mouth two (2) times daily (with meals).  acetaminophen (TYLENOL) 325 mg tablet Take 1,300 mg by mouth nightly.  dextran 70-hypromellose (ARTIFICIAL TEARS) ophthalmic solution Administer 1 Drop to both eyes as needed.  fluticasone (FLONASE) 50 mcg/actuation nasal spray 2 Sprays by Both Nostrils route daily.  allopurinol (ZYLOPRIM) 100 mg tablet Take  by mouth daily.  aspirin delayed-release 81 mg tablet Take 81 mg by mouth daily.  albuterol (PROAIR HFA) 90 mcg/actuation inhaler Take 2 Puffs by inhalation every six (6) hours as needed for Wheezing. 2 PUFFS 4 TIMES DAILY    pravastatin (PRAVACHOL) 20 mg tablet Take 1 Tab by mouth nightly.  latanoprost (XALATAN) 0.005 % ophthalmic solution Administer 1 Drop to both eyes nightly. No current facility-administered medications for this visit. Vitals:    07/25/19 1407   BP: 108/70   Pulse: 60   Resp: 18   SpO2: 95%   Weight: 148 lb (67.1 kg)   Height: 4' 9\" (1.448 m)       I have reviewed the nurses notes, vitals, problem list, allergy list, medical history, family, social history and medications. Review of Symptoms:    General: Pt denies excessive weight gain or loss. Pt is able to conduct ADL's  HEENT: Denies blurred vision, headaches, epistaxis and difficulty swallowing. Respiratory: Denies shortness of breath, DOTSON, wheezing or stridor.   Cardiovascular: Denies precordial pain, palpitations, edema or PND  Gastrointestinal: Denies poor appetite, indigestion, abdominal pain or blood in stool  Urinary: Denies dysuria, pyuria  Musculoskeletal: Denies pain or swelling from muscles or joints  Neurologic: Denies tremor, paresthesias, or sensory motor disturbance  Skin: Denies rash, itching or texture change. Psych: Denies depression      Physical Exam:      General: Well developed, in no acute distress. HEENT: Eyes - PERRL, no jvd  Heart:  Normal S1/S2 negative S3 or S4. Regular, no murmur, gallop or rub. Respiratory: Clear bilaterally x 4, no wheezing or rales  Abdomen:   Soft, non-tender, bowel sounds are active. Extremities:  No edema, normal cap refill, no cyanosis. Musculoskeletal: No clubbing  Neuro: A&Ox3, speech clear, gait stable. Skin: Skin color is normal. No rashes or lesions.  Non diaphoretic  Vascular: 2+ pulses symmetric in all extremities    Cardiographics    Ekg: sinus rhythm     Results for orders placed or performed during the hospital encounter of 11/13/18   EKG, 12 LEAD, INITIAL   Result Value Ref Range    Ventricular Rate 69 BPM    Atrial Rate 69 BPM    P-R Interval 224 ms    QRS Duration 72 ms    Q-T Interval 352 ms    QTC Calculation (Bezet) 377 ms    Calculated R Axis -18 degrees    Calculated T Axis -21 degrees    Diagnosis       AV dual-paced rhythm with prolonged AV conduction  with intermittent ventricular pacing  Confirmed by Rafta Pop ([id number]) on 11/13/2018 3:41:42 PM           Lab Results   Component Value Date/Time    WBC 8.5 03/13/2019 11:40 AM    HGB 13.0 03/13/2019 11:40 AM    HCT 40.1 03/13/2019 11:40 AM    PLATELET 344 07/40/5991 11:40 AM    MCV 97.3 03/13/2019 11:40 AM      Lab Results   Component Value Date/Time    Sodium 138 03/13/2019 11:40 AM    Potassium 3.9 03/13/2019 11:40 AM    Chloride 102 03/13/2019 11:40 AM    CO2 26 03/13/2019 11:40 AM    Anion gap 10 03/13/2019 11:40 AM    Glucose 260 (H) 03/13/2019 11:40 AM    BUN 30 (H) 03/13/2019 11:40 AM Creatinine 1.37 (H) 03/13/2019 11:40 AM    BUN/Creatinine ratio 22 (H) 03/13/2019 11:40 AM    GFR est AA 44 (L) 03/13/2019 11:40 AM    GFR est non-AA 36 (L) 03/13/2019 11:40 AM    Calcium 10.1 03/13/2019 11:40 AM    Bilirubin, total 0.2 03/13/2019 11:40 AM    AST (SGOT) 15 03/13/2019 11:40 AM    Alk. phosphatase 124 (H) 03/13/2019 11:40 AM    Protein, total 7.1 03/13/2019 11:40 AM    Albumin 3.1 (L) 03/13/2019 11:40 AM    Globulin 4.0 03/13/2019 11:40 AM    A-G Ratio 0.8 (L) 03/13/2019 11:40 AM    ALT (SGPT) 13 03/13/2019 11:40 AM         Assessment:     Assessment:        ICD-10-CM ICD-9-CM    1. Sick sinus syndrome (HCC) I49.5 427.81 AMB POC EKG ROUTINE W/ 12 LEADS, INTER & REP   2. Pacemaker Z95.0 V45.01    3. Coronary artery disease involving native coronary artery of native heart without angina pectoris I25.10 414.01    4. Mobitz type II atrioventricular block I44.1 426.12    5. Essential hypertension I10 401.9      Orders Placed This Encounter    AMB POC EKG ROUTINE W/ 12 LEADS, INTER & REP     Order Specific Question:   Reason for Exam:     Answer:   routine    DISCONTD: mirtazapine (REMERON) 15 mg tablet     Sig: take 1/2 tablet by mouth at bedtime     Refill:  0        Plan:   Ms. Raymond Burroughs is here for her annual follow up with dual chamber PM.  She is 31% AP and 36% RVP, brief NSVT. Hx of PAF, no on [address] d/t fall risk and need for long term Brilinta, we will not initiate [address]. She has 4 years left on her battery. Continue current medical therapy and follow up in one month. Continue medical management for CAD, HTN. Thank you for allowing me to participate in [place] [id number] 's care.       Kelli Herrera NP

## 2019-07-25 NOTE — PROGRESS NOTES
Chief Complaint   Patient presents with    Pacemaker Check     Annual follow up     Shortness of Breath    Leg Swelling     jacqueline'    Foot Swelling     jacqueline'     1. Have you been to the ER, urgent care clinic since your last visit? Hospitalized since your last visit? Yes four times since last visit     2. Have you seen or consulted any other health care providers outside of the [address] [place] [address] since your last visit? Include any pap smears or colon screening.   No

## 2019-09-26 NOTE — PROGRESS NOTES
1. Have you been to the ER, urgent care clinic since your last visit? Hospitalized since your last visit? NO 
 
2. Have you seen or consulted any other health care providers outside of the [address] [place] [address] since your last visit? Include any pap smears or colon screening. YES, EYE DOCTOR, PODIATRIST. 6 MONTH FOLLOW UP. C/O SWELLING IN BLE AND HANDS.

## 2019-09-26 NOTE — PROGRESS NOTES
[address]  [phone] Subjective:  
  
Hong Bradshaw is a 80 y.o. female is here for routine f/u on ASHD HFpEF HTN HLD Unchanged right leg swelling, chronic, uses a brace. Wt down 5 lbs since 3/19. Reports improved sob. The patient denies chest pain/ shortness of breath, orthopnea, PND,  palpitations, syncope, or presyncope. Patient Active Problem List  
 Diagnosis Date Noted  Severe obesity (BMI 35.0-39.9) 06/14/2018  Chronic diastolic heart failure ([address] 08/14/2016  Acute blood loss anemia 08/13/2016  GI bleed 08/13/2016  Elevated troponin 08/13/2016  CKD (chronic kidney disease) stage 3, GFR 30-59 ml/min ([facility]) 08/13/2016  Coronary artery disease involving native coronary artery without angina pectoris 11/12/2015  S/P PTCA (percutaneous transluminal coronary angioplasty) 04/09/2015  Pacemaker 03/09/2015  Mobitz type II atrioventricular block 03/07/2015  Syncope and collapse 03/06/2015  CAD (coronary artery disease), native coronary artery 01/03/2015  Chest pain 08/14/2014  
 NSTEMI (non-ST elevated myocardial infarction) ([address] 04/07/2013  Other nonspecific abnormal serum enzyme levels 04/06/2013  Sick sinus syndrome ([address] 04/06/2013  Dehydration 04/06/2013  ACS (acute coronary syndrome) ([address] 04/06/2013  Hypertension 04/06/2013 Munir Huertas NP Past Medical History:  
Diagnosis Date  Asthma  CAD (coronary artery disease)  Diabetes ([address]  Hypertension  Irregular heart rate 4/25/13  Morbid obesity ([address]  Other ill-defined conditions(799.89)   
 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  
Procedure Laterality Date  COLONOSCOPY N/A 8/15/2016 COLONOSCOPY performed by Lilibeth Cook MD at [facility] ENDOSCOPY  
 HX CHOLECYSTECTOMY [facility]  HX HEART CATHETERIZATION    
 HX PACEMAKER    
 HX PACEMAKER PLACEMENT Left Allergies Allergen Reactions  Pcn [Penicillins] Unknown (comments) History reviewed. No pertinent family history. Social History Socioeconomic History  Marital status:  Spouse name: Not on file  Number of children: Not on file  Years of education: Not on file  Highest education level: Not on file Occupational History  Not on file Social Needs  Financial resource strain: Not on file  Food insecurity:  
  Worry: Not on file Inability: Not on file  Transportation needs:  
  Medical: Not on file Non-medical: Not on file Tobacco Use  Smoking status: Never Smoker  Smokeless tobacco: Never Used Substance and Sexual Activity  Alcohol use: No  
 Drug use: No  
 Sexual activity: Yes  
  Partners: Male Lifestyle  Physical activity:  
  Days per week: Not on file Minutes per session: Not on file  Stress: Not on file Relationships  Social connections:  
  Talks on phone: Not on file Gets together: Not on file Attends Buddhism service: Not on file Active member of club or organization: Not on file Attends meetings of clubs or organizations: Not on file Relationship status: Not on file  Intimate partner violence:  
  Fear of current or ex partner: Not on file Emotionally abused: Not on file Physically abused: Not on file Forced sexual activity: Not on file Other Topics Concern  Not on file Social History Narrative  Not on file Current Outpatient Medications Medication Sig  
 isosorbide mononitrate ER (IMDUR) 30 mg tablet take 1 tablet by mouth once daily TO HELP PEVENT CHEST PAINS  BRILINTA 60 mg tab tablet take 1 tablet by mouth twice a day  busPIRone (BUSPAR) 5 mg tablet Take 5 mg by mouth two (2) times a day.  potassium chloride SR (KLOR-CON 10) 10 mEq tablet  diphenoxylate-atropine (LOMOTIL) 2.5-0.025 mg per tablet Take 1 Tab by mouth four (4) times daily as needed for Diarrhea (1 tab after each stool for max 8 per day). Max Daily Amount: 4 Tabs. Take after each stool for a maximum of 8 tablets daily  nitroglycerin (NITROSTAT) 0.4 mg SL tablet place 1 tablet under the tongue if needed every 5 minutes for chest pain for 3 doses IF NO RELIEF AFTER 3RD DOSE CALL PRESCRIBER .  nystatin (MYCOSTATIN) 100,000 unit/gram ointment Apply  to affected area two (2) times a day.  triamcinolone acetonide (KENALOG) 0.5 % ointment Apply  to affected area two (2) times a day. use thin layer  polyethylene glycol (MIRALAX) 17 gram/dose powder Take 17 g by mouth daily as needed. 1 tablespoon with 8 oz of water daily  morphine sulfate/PF (MORPHINE, PF,) soln by Intrathecal route continuous. CREAM APPLIED DAILY FOR FOOT PAIN  
 Cetirizine (ZYRTEC) 10 mg cap Take  by mouth daily.  doxazosin (CARDURA) 2 mg tablet Take 2 mg by mouth daily.  gabapentin (NEURONTIN) 100 mg capsule Take 2 Caps by mouth three (3) times daily.  metoprolol tartrate (LOPRESSOR) 25 mg tablet Take 0.5 Tabs by mouth two (2) times a day.  bumetanide (BUMEX) 1 mg tablet Take 1 Tab by mouth daily. (Patient taking differently: Take 1 mg by mouth daily. 1 and 1/2 daily)  magnesium oxide (MAG-OX) 400 mg tablet Take 1 Tab by mouth daily.  pantoprazole (PROTONIX) 40 mg tablet Take 1 Tab by mouth Daily (before breakfast).  traMADol (ULTRAM) 50 mg tablet Take 1 Tab by mouth every six (6) hours as needed for Pain. Max Daily Amount: 200 mg.  ferrous sulfate 325 mg (65 mg iron) tablet Take 1 Tab by mouth two (2) times daily (with meals).  acetaminophen (TYLENOL) 325 mg tablet Take 1,300 mg by mouth nightly.  dextran 70-hypromellose (ARTIFICIAL TEARS) ophthalmic solution Administer 1 Drop to both eyes as needed.   
 fluticasone (FLONASE) 50 mcg/actuation nasal spray 2 Sprays by Both Nostrils route daily.  allopurinol (ZYLOPRIM) 100 mg tablet Take  by mouth daily.  aspirin delayed-release 81 mg tablet Take 81 mg by mouth daily.  albuterol (PROAIR HFA) 90 mcg/actuation inhaler Take 2 Puffs by inhalation every six (6) hours as needed for Wheezing. 2 PUFFS 4 TIMES DAILY  pravastatin (PRAVACHOL) 20 mg tablet Take 1 Tab by mouth nightly.  latanoprost (XALATAN) 0.005 % ophthalmic solution Administer 1 Drop to both eyes nightly. No current facility-administered medications for this visit. Review of Symptoms: 
11 systems reviewed, negative other than as stated in the HPI Physical ExamPhysical Exam:   
Vitals:  
 09/26/19 1034 09/26/19 1049 BP: 118/72 118/70 Pulse: 76 Resp: 16 Weight: 145 lb 1.6 oz (65.8 kg) Height: 4' 9\" (1.448 m) Body mass index is 31.4 kg/m². General PE Gen:  NAD Mental Status - Alert. General Appearance - Not in acute distress. HEENT: 
PERRL, no carotid bruits or JVD Chest and Lung Exam  
Inspection: Accessory muscles - No use of accessory muscles in breathing. Auscultation:  
Breath sounds: - Normal.  
Cardiovascular Inspection: Jugular vein - Bilateral - Inspection Normal.  
Palpation/Percussion:  
Apical Impulse: - Normal.  
Auscultation: Rhythm - Regular. Heart Sounds - S1 WNL and S2 WNL. No S3 or S4. Murmurs & Other Heart Sounds: Auscultation of the heart reveals - No Murmurs. Peripheral Vascular Upper Extremity: Inspection - Bilateral - No Cyanotic nailbeds or Digital clubbing. Lower Extremity:  
Palpation: Edema - Bilateral - dependent R>L, chronic Abdomen:   Soft, non-tender, bowel sounds are active. Neuro: A&O times 3, CN and motor grossly WNL Labs:  
Lab Results Component Value Date/Time  Cholesterol, total 128 03/28/2017 10:46 AM  
 Cholesterol, total 107 01/19/2015 09:35 AM  
 Cholesterol, total 115 01/05/2015 02:25 AM  
 Cholesterol, total 73 04/08/2013 03:40 AM  
 Cholesterol, total 88 04/07/2013 03:35 AM  
 HDL Cholesterol 43 03/28/2017 10:46 AM  
 HDL Cholesterol 50 01/19/2015 09:35 AM  
 HDL Cholesterol 46 01/05/2015 02:25 AM  
 HDL Cholesterol 23 04/08/2013 03:40 AM  
 HDL Cholesterol 27 04/07/2013 03:35 AM  
 LDL, calculated 70 03/28/2017 10:46 AM  
 LDL, calculated 46 01/19/2015 09:35 AM  
 LDL, calculated 54 01/05/2015 02:25 AM  
 LDL, calculated 36.6 04/08/2013 03:40 AM  
 LDL, calculated 48 04/07/2013 03:35 AM  
 Triglyceride 76 03/28/2017 10:46 AM  
 Triglyceride 55 01/19/2015 09:35 AM  
 Triglyceride 75 01/05/2015 02:25 AM  
 Triglyceride 67 04/08/2013 03:40 AM  
 Triglyceride 65 04/07/2013 03:35 AM  
 CHOL/HDL Ratio 2.5 01/05/2015 02:25 AM  
 CHOL/HDL Ratio 3.2 04/08/2013 03:40 AM  
 CHOL/HDL Ratio 3.3 04/07/2013 03:35 AM  
 
Lab Results Component Value Date/Time CK 23 (L) 11/13/2018 01:37 PM  
 
Lab Results Component Value Date/Time Sodium 138 03/13/2019 11:40 AM  
 Potassium 3.9 03/13/2019 11:40 AM  
 Chloride 102 03/13/2019 11:40 AM  
 CO2 26 03/13/2019 11:40 AM  
 Anion gap 10 03/13/2019 11:40 AM  
 Glucose 260 (H) 03/13/2019 11:40 AM  
 BUN 30 (H) 03/13/2019 11:40 AM  
 Creatinine 1.37 (H) 03/13/2019 11:40 AM  
 BUN/Creatinine ratio 22 (H) 03/13/2019 11:40 AM  
 GFR est AA 44 (L) 03/13/2019 11:40 AM  
 GFR est non-AA 36 (L) 03/13/2019 11:40 AM  
 Calcium 10.1 03/13/2019 11:40 AM  
 Bilirubin, total 0.2 03/13/2019 11:40 AM  
 AST (SGOT) 15 03/13/2019 11:40 AM  
 Alk. phosphatase 124 (H) 03/13/2019 11:40 AM  
 Protein, total 7.1 03/13/2019 11:40 AM  
 Albumin 3.1 (L) 03/13/2019 11:40 AM  
 Globulin 4.0 03/13/2019 11:40 AM  
 A-G Ratio 0.8 (L) 03/13/2019 11:40 AM  
 ALT (SGPT) 13 03/13/2019 11:40 AM  
 
 
EKG: 
Paced Assessment: 
 
 Assessment: 1. Coronary artery disease involving native coronary artery of native heart without angina pectoris 2. Pacemaker 3. Chronic diastolic heart failure ([address] 4. Essential hypertension 5. Sick sinus syndrome ([address] 6. CKD (chronic kidney disease), stage III ([address] Orders Placed This Encounter  AMB POC EKG ROUTINE W/ 12 LEADS, INTER & REP Order Specific Question:   Reason for Exam: Answer:   routine Plan:  
 
Patient presents for 6 mos follow up, doing well and stable from cardiac standpoint. Unchanged right leg swelling, chronic, uses a brace. Wt down 5 lbs since 3/19. Reports improved sob. ASHD Cardiac cath in 3/2016 with patent LAD, D2 and RCA stents without evidence of worsening CAD or in-stent restenosis. Continue ASA, Brilinta 60 mg BID, Imdur, BB, statin Note: Maintain Brilinta 60mg bid lifelong with h/o bifurcation stenting and NSTEMI thereafter with no new coronary lesions- ? Thrombus resolved HTN 
BP controlled; continue with anti-hypertensive therapy and low sodium diet. HFpEF Echo in 1/2015 with preserved LVEF 55-60% with grade 1 DD Wt down 5 lbs since 3/19 Cr 1.47 in 3/19  
PCP increased Bumex to 1.5 mg daily---has f/u next mos  
 
  
Mobitz type II atrioventricular block, PM  
Per ep 7/19: She is 31% AP and 36% RVP, brief NSVT. Hx of PAF, no on [facility] d/t fall risk and need for long term Brilinta, we will not initiate [facility] Continue with routine device interrogations in device clinic with Dr. Jerardo Lopez. 
 
CKD III Cr 1.47 in 3/19  
 
HLD On statin. Labs and lipids per PCP Continue current care and f/u in 6 months.  
 
Darryl Ramirez MD

## 2019-09-26 NOTE — LETTER
9/26/19 Patient: Conrad Portillo YOB: 1927 Date of Visit: 9/26/2019 Ambrose Martinez NP 
[address] [place] [address] VIA Facsimile: [phone] Dear Ambrose Martinez NP, Thank you for referring Ms. Piero Patton to [address] [place] [address] for evaluation. My notes for this consultation are attached. If you have questions, please do not hesitate to call me. I look forward to following your patient along with you.  
 
 
Sincerely, 
 
Vale Keith MD

## 2020-01-01 ENCOUNTER — APPOINTMENT (OUTPATIENT)
Dept: ULTRASOUND IMAGING | Age: 85
DRG: 442 | End: 2020-01-01
Attending: SPECIALIST
Payer: MEDICARE

## 2020-01-01 ENCOUNTER — APPOINTMENT (OUTPATIENT)
Dept: NON INVASIVE DIAGNOSTICS | Age: 85
DRG: 442 | End: 2020-01-01
Attending: INTERNAL MEDICINE
Payer: MEDICARE

## 2020-01-01 ENCOUNTER — OFFICE VISIT (OUTPATIENT)
Dept: CARDIOLOGY CLINIC | Age: 85
End: 2020-01-01
Payer: OTHER MISCELLANEOUS

## 2020-01-01 ENCOUNTER — APPOINTMENT (OUTPATIENT)
Dept: ULTRASOUND IMAGING | Age: 85
DRG: 442 | End: 2020-01-01
Attending: EMERGENCY MEDICINE
Payer: MEDICARE

## 2020-01-01 ENCOUNTER — HOSPITAL ENCOUNTER (INPATIENT)
Age: 85
LOS: 4 days | Discharge: NURSING, REHAB OR LONG-TERM CARE | DRG: 442 | End: 2020-02-19
Attending: EMERGENCY MEDICINE | Admitting: INTERNAL MEDICINE
Payer: MEDICARE

## 2020-01-01 ENCOUNTER — APPOINTMENT (OUTPATIENT)
Dept: CT IMAGING | Age: 85
DRG: 442 | End: 2020-01-01
Attending: EMERGENCY MEDICINE
Payer: MEDICARE

## 2020-01-01 ENCOUNTER — TELEPHONE (OUTPATIENT)
Dept: CARDIOLOGY CLINIC | Age: 85
End: 2020-01-01

## 2020-01-01 ENCOUNTER — OFFICE VISIT (OUTPATIENT)
Dept: CARDIOLOGY CLINIC | Age: 85
End: 2020-01-01

## 2020-01-01 VITALS
RESPIRATION RATE: 18 BRPM | TEMPERATURE: 97.7 F | SYSTOLIC BLOOD PRESSURE: 149 MMHG | DIASTOLIC BLOOD PRESSURE: 76 MMHG | OXYGEN SATURATION: 99 % | HEART RATE: 99 BPM

## 2020-01-01 DIAGNOSIS — Z95.0 CARDIAC PACEMAKER IN SITU: Primary | ICD-10-CM

## 2020-01-01 DIAGNOSIS — R11.2 NON-INTRACTABLE VOMITING WITH NAUSEA, UNSPECIFIED VOMITING TYPE: ICD-10-CM

## 2020-01-01 DIAGNOSIS — I44.1 MOBITZ TYPE II ATRIOVENTRICULAR BLOCK: ICD-10-CM

## 2020-01-01 DIAGNOSIS — R74.01 TRANSAMINITIS: Primary | ICD-10-CM

## 2020-01-01 DIAGNOSIS — M79.2 NEUROPATHIC PAIN: ICD-10-CM

## 2020-01-01 DIAGNOSIS — R10.11 ABDOMINAL PAIN, RIGHT UPPER QUADRANT: ICD-10-CM

## 2020-01-01 LAB
A1AT SERPL-MCNC: 168 MG/DL (ref 101–187)
ALBUMIN SERPL ELPH-MCNC: 2.8 G/DL (ref 2.9–4.4)
ALBUMIN SERPL-MCNC: 2.5 G/DL (ref 3.5–5)
ALBUMIN SERPL-MCNC: 2.5 G/DL (ref 3.5–5)
ALBUMIN SERPL-MCNC: 2.7 G/DL (ref 3.5–5)
ALBUMIN SERPL-MCNC: 3 G/DL (ref 3.5–5)
ALBUMIN SERPL-MCNC: 3.2 G/DL (ref 3.5–5)
ALBUMIN/GLOB SERPL: 0.6 {RATIO} (ref 1.1–2.2)
ALBUMIN/GLOB SERPL: 0.7 {RATIO} (ref 1.1–2.2)
ALBUMIN/GLOB SERPL: 1 {RATIO} (ref 0.7–1.7)
ALP SERPL-CCNC: 318 U/L (ref 45–117)
ALP SERPL-CCNC: 326 U/L (ref 45–117)
ALP SERPL-CCNC: 364 U/L (ref 45–117)
ALP SERPL-CCNC: 373 U/L (ref 45–117)
ALP SERPL-CCNC: 406 U/L (ref 45–117)
ALPHA1 GLOB SERPL ELPH-MCNC: 0.3 G/DL (ref 0–0.4)
ALPHA2 GLOB SERPL ELPH-MCNC: 0.9 G/DL (ref 0.4–1)
ALT SERPL-CCNC: 1111 U/L (ref 12–78)
ALT SERPL-CCNC: 1520 U/L (ref 12–78)
ALT SERPL-CCNC: 573 U/L (ref 12–78)
ALT SERPL-CCNC: 648 U/L (ref 12–78)
ALT SERPL-CCNC: 771 U/L (ref 12–78)
AMMONIA PLAS-SCNC: 24 UMOL/L
ANA TITR SER IF: NEGATIVE {TITER}
ANION GAP SERPL CALC-SCNC: 6 MMOL/L (ref 5–15)
ANION GAP SERPL CALC-SCNC: 7 MMOL/L (ref 5–15)
APAP SERPL-MCNC: 14 UG/ML (ref 10–30)
APPEARANCE UR: CLEAR
AST SERPL-CCNC: 1553 U/L (ref 15–37)
AST SERPL-CCNC: 157 U/L (ref 15–37)
AST SERPL-CCNC: 1941 U/L (ref 15–37)
AST SERPL-CCNC: 244 U/L (ref 15–37)
AST SERPL-CCNC: 788 U/L (ref 15–37)
ATRIAL RATE: 71 BPM
AV VELOCITY RATIO: 0.96
B-GLOBULIN SERPL ELPH-MCNC: 0.9 G/DL (ref 0.7–1.3)
BACTERIA URNS QL MICRO: NEGATIVE /HPF
BASOPHILS # BLD: 0 K/UL (ref 0–0.1)
BASOPHILS NFR BLD: 0 % (ref 0–1)
BILIRUB SERPL-MCNC: 1 MG/DL (ref 0.2–1)
BILIRUB SERPL-MCNC: 1.2 MG/DL (ref 0.2–1)
BILIRUB SERPL-MCNC: 1.6 MG/DL (ref 0.2–1)
BILIRUB SERPL-MCNC: 2.3 MG/DL (ref 0.2–1)
BILIRUB SERPL-MCNC: 2.4 MG/DL (ref 0.2–1)
BILIRUB UR QL CFM: NEGATIVE
BUN SERPL-MCNC: 15 MG/DL (ref 6–20)
BUN SERPL-MCNC: 22 MG/DL (ref 6–20)
BUN SERPL-MCNC: 41 MG/DL (ref 6–20)
BUN SERPL-MCNC: 8 MG/DL (ref 6–20)
BUN SERPL-MCNC: 9 MG/DL (ref 6–20)
BUN/CREAT SERPL: 12 (ref 12–20)
BUN/CREAT SERPL: 13 (ref 12–20)
BUN/CREAT SERPL: 19 (ref 12–20)
BUN/CREAT SERPL: 26 (ref 12–20)
BUN/CREAT SERPL: 26 (ref 12–20)
CALCIUM SERPL-MCNC: 10 MG/DL (ref 8.5–10.1)
CALCIUM SERPL-MCNC: 10.4 MG/DL (ref 8.5–10.1)
CALCIUM SERPL-MCNC: 10.7 MG/DL (ref 8.5–10.1)
CALCIUM SERPL-MCNC: 10.8 MG/DL (ref 8.5–10.1)
CALCIUM SERPL-MCNC: 9.8 MG/DL (ref 8.5–10.1)
CALCULATED R AXIS, ECG10: -3 DEGREES
CALCULATED T AXIS, ECG11: -2 DEGREES
CHLORIDE SERPL-SCNC: 104 MMOL/L (ref 97–108)
CHLORIDE SERPL-SCNC: 111 MMOL/L (ref 97–108)
CHLORIDE SERPL-SCNC: 112 MMOL/L (ref 97–108)
CHLORIDE SERPL-SCNC: 114 MMOL/L (ref 97–108)
CHLORIDE SERPL-SCNC: 115 MMOL/L (ref 97–108)
CO2 SERPL-SCNC: 21 MMOL/L (ref 21–32)
CO2 SERPL-SCNC: 22 MMOL/L (ref 21–32)
CO2 SERPL-SCNC: 22 MMOL/L (ref 21–32)
CO2 SERPL-SCNC: 23 MMOL/L (ref 21–32)
CO2 SERPL-SCNC: 27 MMOL/L (ref 21–32)
COLOR UR: ABNORMAL
COMMENT, HOLDF: NORMAL
COMMENT, HOLDF: NORMAL
CREAT SERPL-MCNC: 0.65 MG/DL (ref 0.55–1.02)
CREAT SERPL-MCNC: 0.7 MG/DL (ref 0.55–1.02)
CREAT SERPL-MCNC: 0.79 MG/DL (ref 0.55–1.02)
CREAT SERPL-MCNC: 0.85 MG/DL (ref 0.55–1.02)
CREAT SERPL-MCNC: 1.55 MG/DL (ref 0.55–1.02)
DIAGNOSIS, 93000: NORMAL
DIFFERENTIAL METHOD BLD: ABNORMAL
ECHO AO ROOT DIAM: 2.48 CM
ECHO AV AREA PEAK VELOCITY: 1.8 CM2
ECHO AV AREA/BSA PEAK VELOCITY: 1.1 CM2/M2
ECHO AV PEAK GRADIENT: 8.8 MMHG
ECHO AV PEAK VELOCITY: 148.19 CM/S
ECHO EST RA PRESSURE: 10 MMHG
ECHO LA AREA 4C: 12.1 CM2
ECHO LA MAJOR AXIS: 2.56 CM
ECHO LA TO AORTIC ROOT RATIO: 1.03
ECHO LA VOL 4C: 24.75 ML (ref 22–52)
ECHO LV E' LATERAL VELOCITY: 6.35 CM/S
ECHO LV E' SEPTAL VELOCITY: 6.17 CM/S
ECHO LV EDV TEICHHOLZ: 0.2 ML
ECHO LV ESV TEICHHOLZ: 0.06 ML
ECHO LV INTERNAL DIMENSION DIASTOLIC: 2.76 CM (ref 3.9–5.3)
ECHO LV INTERNAL DIMENSION SYSTOLIC: 1.76 CM
ECHO LV IVSD: 1.49 CM (ref 0.6–0.9)
ECHO LV MASS 2D: 120.5 G (ref 67–162)
ECHO LV POSTERIOR WALL DIASTOLIC: 1.06 CM (ref 0.6–0.9)
ECHO LV POSTERIOR WALL SYSTOLIC: 1.13 CM
ECHO LVOT DIAM: 1.55 CM
ECHO LVOT PEAK GRADIENT: 8 MMHG
ECHO LVOT PEAK VELOCITY: 141.81 CM/S
ECHO LVOT SV: 70.1 ML
ECHO LVOT VTI: 37.11 CM
ECHO MV A VELOCITY: 96.82 CM/S
ECHO MV AREA VTI: 2.4 CM2
ECHO MV E DECELERATION TIME (DT): 190.7 MS
ECHO MV E VELOCITY: 89.66 CM/S
ECHO MV E/A RATIO: 0.93
ECHO MV E/E' LATERAL: 14.12
ECHO MV E/E' RATIO (AVERAGED): 14.33
ECHO MV E/E' SEPTAL: 14.53
ECHO MV MAX VELOCITY: 123.79 CM/S
ECHO MV MEAN GRADIENT: 2.8 MMHG
ECHO MV MEAN INFLOW VELOCITY: 0.76 M/S
ECHO MV PEAK GRADIENT: 6.1 MMHG
ECHO MV REGURGITANT PEAK GRADIENT: 8.6 MMHG
ECHO MV REGURGITANT PEAK VELOCITY: 146.7 CM/S
ECHO MV VTI: 28.94 CM
ECHO PULMONARY ARTERY SYSTOLIC PRESSURE (PASP): 50 MMHG
ECHO RA AREA 4C: 9.58 CM2
ECHO RIGHT VENTRICULAR SYSTOLIC PRESSURE (RVSP): 47 MMHG
ECHO TV REGURGITANT MAX VELOCITY: 304.27 CM/S
ECHO TV REGURGITANT PEAK GRADIENT: 37 MMHG
EOSINOPHIL # BLD: 0 K/UL (ref 0–0.4)
EOSINOPHIL # BLD: 0.2 K/UL (ref 0–0.4)
EOSINOPHIL # BLD: 0.4 K/UL (ref 0–0.4)
EOSINOPHIL # BLD: 0.5 K/UL (ref 0–0.4)
EOSINOPHIL NFR BLD: 1 % (ref 0–7)
EOSINOPHIL NFR BLD: 3 % (ref 0–7)
EOSINOPHIL NFR BLD: 6 % (ref 0–7)
EOSINOPHIL NFR BLD: 7 % (ref 0–7)
EPITH CASTS URNS QL MICRO: ABNORMAL /LPF
ERYTHROCYTE [DISTWIDTH] IN BLOOD BY AUTOMATED COUNT: 13.6 % (ref 11.5–14.5)
ERYTHROCYTE [DISTWIDTH] IN BLOOD BY AUTOMATED COUNT: 13.7 % (ref 11.5–14.5)
ERYTHROCYTE [DISTWIDTH] IN BLOOD BY AUTOMATED COUNT: 13.7 % (ref 11.5–14.5)
ERYTHROCYTE [DISTWIDTH] IN BLOOD BY AUTOMATED COUNT: 13.9 % (ref 11.5–14.5)
FERRITIN SERPL-MCNC: 2759 NG/ML (ref 8–252)
GAMMA GLOB SERPL ELPH-MCNC: 0.8 G/DL (ref 0.4–1.8)
GLOBULIN SER CALC-MCNC: 2.8 G/DL (ref 2.2–3.9)
GLOBULIN SER CALC-MCNC: 3.5 G/DL (ref 2–4)
GLOBULIN SER CALC-MCNC: 3.7 G/DL (ref 2–4)
GLOBULIN SER CALC-MCNC: 4.2 G/DL (ref 2–4)
GLOBULIN SER CALC-MCNC: 4.2 G/DL (ref 2–4)
GLOBULIN SER CALC-MCNC: 4.3 G/DL (ref 2–4)
GLUCOSE BLD STRIP.AUTO-MCNC: 103 MG/DL (ref 65–100)
GLUCOSE SERPL-MCNC: 109 MG/DL (ref 65–100)
GLUCOSE SERPL-MCNC: 144 MG/DL (ref 65–100)
GLUCOSE SERPL-MCNC: 241 MG/DL (ref 65–100)
GLUCOSE SERPL-MCNC: 74 MG/DL (ref 65–100)
GLUCOSE SERPL-MCNC: 96 MG/DL (ref 65–100)
GLUCOSE UR STRIP.AUTO-MCNC: NEGATIVE MG/DL
HAV IGM SER QL: NONREACTIVE
HBV CORE IGM SER QL: NONREACTIVE
HBV SURFACE AG SER QL: <0.1 INDEX
HBV SURFACE AG SER QL: NEGATIVE
HCT VFR BLD AUTO: 31.2 % (ref 35–47)
HCT VFR BLD AUTO: 32.6 % (ref 35–47)
HCT VFR BLD AUTO: 37.8 % (ref 35–47)
HCT VFR BLD AUTO: 38.3 % (ref 35–47)
HCV AB SERPL QL IA: NONREACTIVE
HCV COMMENT,HCGAC: NORMAL
HFE GENE MUT ANL BLD/T: NORMAL
HGB BLD-MCNC: 10.1 G/DL (ref 11.5–16)
HGB BLD-MCNC: 10.4 G/DL (ref 11.5–16)
HGB BLD-MCNC: 11.9 G/DL (ref 11.5–16)
HGB BLD-MCNC: 12.1 G/DL (ref 11.5–16)
HGB UR QL STRIP: NEGATIVE
HYALINE CASTS URNS QL MICRO: ABNORMAL /LPF (ref 0–5)
IMM GRANULOCYTES # BLD AUTO: 0 K/UL (ref 0–0.04)
IMM GRANULOCYTES # BLD AUTO: 0.1 K/UL (ref 0–0.04)
IMM GRANULOCYTES NFR BLD AUTO: 1 % (ref 0–0.5)
INR PPP: 1 (ref 0.9–1.1)
INR PPP: 1.1 (ref 0.9–1.1)
IRON SATN MFR SERPL: 59 % (ref 20–50)
IRON SERPL-MCNC: 109 UG/DL (ref 35–150)
KETONES UR QL STRIP.AUTO: NEGATIVE MG/DL
LACTATE SERPL-SCNC: 1.1 MMOL/L (ref 0.4–2)
LACTATE SERPL-SCNC: 3 MMOL/L (ref 0.4–2)
LEUKOCYTE ESTERASE UR QL STRIP.AUTO: NEGATIVE
LIPASE SERPL-CCNC: 127 U/L (ref 73–393)
LVFS 2D: 36.18 %
LVOT MG: 4.92 MMHG
LVOT MV: 1.01 CM/S
LVSV (TEICH): 11.9 ML
LYMPHOCYTES # BLD: 1 K/UL (ref 0.8–3.5)
LYMPHOCYTES # BLD: 1.1 K/UL (ref 0.8–3.5)
LYMPHOCYTES # BLD: 1.1 K/UL (ref 0.8–3.5)
LYMPHOCYTES # BLD: 1.2 K/UL (ref 0.8–3.5)
LYMPHOCYTES NFR BLD: 16 % (ref 12–49)
LYMPHOCYTES NFR BLD: 16 % (ref 12–49)
LYMPHOCYTES NFR BLD: 17 % (ref 12–49)
LYMPHOCYTES NFR BLD: 18 % (ref 12–49)
M PROTEIN SERPL ELPH-MCNC: ABNORMAL G/DL
MCH RBC QN AUTO: 31.3 PG (ref 26–34)
MCH RBC QN AUTO: 31.8 PG (ref 26–34)
MCH RBC QN AUTO: 31.8 PG (ref 26–34)
MCH RBC QN AUTO: 32.2 PG (ref 26–34)
MCHC RBC AUTO-ENTMCNC: 31.1 G/DL (ref 30–36.5)
MCHC RBC AUTO-ENTMCNC: 31.9 G/DL (ref 30–36.5)
MCHC RBC AUTO-ENTMCNC: 32 G/DL (ref 30–36.5)
MCHC RBC AUTO-ENTMCNC: 32.4 G/DL (ref 30–36.5)
MCV RBC AUTO: 100.5 FL (ref 80–99)
MCV RBC AUTO: 100.8 FL (ref 80–99)
MCV RBC AUTO: 98.1 FL (ref 80–99)
MCV RBC AUTO: 99.7 FL (ref 80–99)
MITOCHONDRIA M2 IGG SER-ACNC: <20 UNITS (ref 0–20)
MONOCYTES # BLD: 0.2 K/UL (ref 0–1)
MONOCYTES # BLD: 0.3 K/UL (ref 0–1)
MONOCYTES # BLD: 0.3 K/UL (ref 0–1)
MONOCYTES # BLD: 0.4 K/UL (ref 0–1)
MONOCYTES NFR BLD: 4 % (ref 5–13)
MONOCYTES NFR BLD: 5 % (ref 5–13)
MV DEC SLOPE: 4.7
NEUTS SEG # BLD: 3.9 K/UL (ref 1.8–8)
NEUTS SEG # BLD: 4.8 K/UL (ref 1.8–8)
NEUTS SEG # BLD: 5.1 K/UL (ref 1.8–8)
NEUTS SEG # BLD: 5.8 K/UL (ref 1.8–8)
NEUTS SEG NFR BLD: 69 % (ref 32–75)
NEUTS SEG NFR BLD: 72 % (ref 32–75)
NEUTS SEG NFR BLD: 75 % (ref 32–75)
NEUTS SEG NFR BLD: 77 % (ref 32–75)
NITRITE UR QL STRIP.AUTO: NEGATIVE
NRBC # BLD: 0 K/UL (ref 0–0.01)
NRBC BLD-RTO: 0 PER 100 WBC
PH UR STRIP: 6 [PH] (ref 5–8)
PLATELET # BLD AUTO: 196 K/UL (ref 150–400)
PLATELET # BLD AUTO: 201 K/UL (ref 150–400)
PLATELET # BLD AUTO: 206 K/UL (ref 150–400)
PLATELET # BLD AUTO: 214 K/UL (ref 150–400)
PMV BLD AUTO: 9.4 FL (ref 8.9–12.9)
PMV BLD AUTO: 9.4 FL (ref 8.9–12.9)
PMV BLD AUTO: 9.6 FL (ref 8.9–12.9)
PMV BLD AUTO: 9.7 FL (ref 8.9–12.9)
POTASSIUM SERPL-SCNC: 3.6 MMOL/L (ref 3.5–5.1)
POTASSIUM SERPL-SCNC: 3.7 MMOL/L (ref 3.5–5.1)
POTASSIUM SERPL-SCNC: 3.8 MMOL/L (ref 3.5–5.1)
POTASSIUM SERPL-SCNC: 3.9 MMOL/L (ref 3.5–5.1)
POTASSIUM SERPL-SCNC: 4.2 MMOL/L (ref 3.5–5.1)
PROT SERPL-MCNC: 5.6 G/DL (ref 6–8.5)
PROT SERPL-MCNC: 6 G/DL (ref 6.4–8.2)
PROT SERPL-MCNC: 6.2 G/DL (ref 6.4–8.2)
PROT SERPL-MCNC: 6.9 G/DL (ref 6.4–8.2)
PROT SERPL-MCNC: 7.2 G/DL (ref 6.4–8.2)
PROT SERPL-MCNC: 7.5 G/DL (ref 6.4–8.2)
PROT UR STRIP-MCNC: 30 MG/DL
PROTHROMBIN TIME: 10.3 SEC (ref 9–11.1)
PROTHROMBIN TIME: 10.8 SEC (ref 9–11.1)
Q-T INTERVAL, ECG07: 398 MS
QRS DURATION, ECG06: 68 MS
QTC CALCULATION (BEZET), ECG08: 432 MS
RBC # BLD AUTO: 3.18 M/UL (ref 3.8–5.2)
RBC # BLD AUTO: 3.27 M/UL (ref 3.8–5.2)
RBC # BLD AUTO: 3.76 M/UL (ref 3.8–5.2)
RBC # BLD AUTO: 3.8 M/UL (ref 3.8–5.2)
RBC #/AREA URNS HPF: ABNORMAL /HPF (ref 0–5)
SAMPLES BEING HELD,HOLD: NORMAL
SAMPLES BEING HELD,HOLD: NORMAL
SERVICE CMNT-IMP: ABNORMAL
SODIUM SERPL-SCNC: 138 MMOL/L (ref 136–145)
SODIUM SERPL-SCNC: 139 MMOL/L (ref 136–145)
SODIUM SERPL-SCNC: 140 MMOL/L (ref 136–145)
SODIUM SERPL-SCNC: 142 MMOL/L (ref 136–145)
SODIUM SERPL-SCNC: 143 MMOL/L (ref 136–145)
SP GR UR REFRACTOMETRY: 1.02 (ref 1–1.03)
SP1: NORMAL
SP2: NORMAL
SP3: NORMAL
TIBC SERPL-MCNC: 186 UG/DL (ref 250–450)
TROPONIN I SERPL-MCNC: 0.12 NG/ML
TROPONIN I SERPL-MCNC: 0.16 NG/ML
TROPONIN I SERPL-MCNC: 0.18 NG/ML
UA: UC IF INDICATED,UAUC: ABNORMAL
UROBILINOGEN UR QL STRIP.AUTO: 2 EU/DL (ref 0.2–1)
VENTRICULAR RATE, ECG03: 71 BPM
WBC # BLD AUTO: 5.6 K/UL (ref 3.6–11)
WBC # BLD AUTO: 6.2 K/UL (ref 3.6–11)
WBC # BLD AUTO: 6.9 K/UL (ref 3.6–11)
WBC # BLD AUTO: 7.9 K/UL (ref 3.6–11)
WBC URNS QL MICRO: ABNORMAL /HPF (ref 0–4)

## 2020-01-01 PROCEDURE — 74011250637 HC RX REV CODE- 250/637: Performed by: INTERNAL MEDICINE

## 2020-01-01 PROCEDURE — 80053 COMPREHEN METABOLIC PANEL: CPT

## 2020-01-01 PROCEDURE — 36415 COLL VENOUS BLD VENIPUNCTURE: CPT

## 2020-01-01 PROCEDURE — 80307 DRUG TEST PRSMV CHEM ANLYZR: CPT

## 2020-01-01 PROCEDURE — 74011250637 HC RX REV CODE- 250/637: Performed by: FAMILY MEDICINE

## 2020-01-01 PROCEDURE — 99285 EMERGENCY DEPT VISIT HI MDM: CPT

## 2020-01-01 PROCEDURE — 85025 COMPLETE CBC W/AUTO DIFF WBC: CPT

## 2020-01-01 PROCEDURE — 74011250636 HC RX REV CODE- 250/636: Performed by: INTERNAL MEDICINE

## 2020-01-01 PROCEDURE — 65660000000 HC RM CCU STEPDOWN

## 2020-01-01 PROCEDURE — 74011250636 HC RX REV CODE- 250/636: Performed by: EMERGENCY MEDICINE

## 2020-01-01 PROCEDURE — 74176 CT ABD & PELVIS W/O CONTRAST: CPT

## 2020-01-01 PROCEDURE — 93296 REM INTERROG EVL PM/IDS: CPT | Performed by: INTERNAL MEDICINE

## 2020-01-01 PROCEDURE — 76700 US EXAM ABDOM COMPLETE: CPT

## 2020-01-01 PROCEDURE — 80074 ACUTE HEPATITIS PANEL: CPT

## 2020-01-01 PROCEDURE — 94760 N-INVAS EAR/PLS OXIMETRY 1: CPT

## 2020-01-01 PROCEDURE — 83540 ASSAY OF IRON: CPT

## 2020-01-01 PROCEDURE — 83605 ASSAY OF LACTIC ACID: CPT

## 2020-01-01 PROCEDURE — 77030038269 HC DRN EXT URIN PURWCK BARD -A

## 2020-01-01 PROCEDURE — 83516 IMMUNOASSAY NONANTIBODY: CPT

## 2020-01-01 PROCEDURE — 96374 THER/PROPH/DIAG INJ IV PUSH: CPT

## 2020-01-01 PROCEDURE — 93005 ELECTROCARDIOGRAM TRACING: CPT

## 2020-01-01 PROCEDURE — 84484 ASSAY OF TROPONIN QUANT: CPT

## 2020-01-01 PROCEDURE — 93306 TTE W/DOPPLER COMPLETE: CPT

## 2020-01-01 PROCEDURE — 77030040361 HC SLV COMPR DVT MDII -B

## 2020-01-01 PROCEDURE — 85610 PROTHROMBIN TIME: CPT

## 2020-01-01 PROCEDURE — 74011000250 HC RX REV CODE- 250: Performed by: INTERNAL MEDICINE

## 2020-01-01 PROCEDURE — 74011250637 HC RX REV CODE- 250/637: Performed by: EMERGENCY MEDICINE

## 2020-01-01 PROCEDURE — 84165 PROTEIN E-PHORESIS SERUM: CPT

## 2020-01-01 PROCEDURE — 81256 HFE GENE: CPT

## 2020-01-01 PROCEDURE — 82140 ASSAY OF AMMONIA: CPT

## 2020-01-01 PROCEDURE — 82103 ALPHA-1-ANTITRYPSIN TOTAL: CPT

## 2020-01-01 PROCEDURE — 93294 REM INTERROG EVL PM/LDLS PM: CPT | Performed by: INTERNAL MEDICINE

## 2020-01-01 PROCEDURE — 74011250636 HC RX REV CODE- 250/636: Performed by: FAMILY MEDICINE

## 2020-01-01 PROCEDURE — 82962 GLUCOSE BLOOD TEST: CPT

## 2020-01-01 PROCEDURE — 81001 URINALYSIS AUTO W/SCOPE: CPT

## 2020-01-01 PROCEDURE — 76705 ECHO EXAM OF ABDOMEN: CPT

## 2020-01-01 PROCEDURE — 82728 ASSAY OF FERRITIN: CPT

## 2020-01-01 PROCEDURE — 96375 TX/PRO/DX INJ NEW DRUG ADDON: CPT

## 2020-01-01 PROCEDURE — 83690 ASSAY OF LIPASE: CPT

## 2020-01-01 PROCEDURE — 86038 ANTINUCLEAR ANTIBODIES: CPT

## 2020-01-01 RX ORDER — ALBUTEROL SULFATE 0.83 MG/ML
2.5 SOLUTION RESPIRATORY (INHALATION)
Status: DISCONTINUED | OUTPATIENT
Start: 2020-01-01 | End: 2020-01-01 | Stop reason: HOSPADM

## 2020-01-01 RX ORDER — LATANOPROST 50 UG/ML
1 SOLUTION/ DROPS OPHTHALMIC
Status: DISCONTINUED | OUTPATIENT
Start: 2020-01-01 | End: 2020-01-01 | Stop reason: HOSPADM

## 2020-01-01 RX ORDER — SODIUM CHLORIDE 9 MG/ML
50 INJECTION, SOLUTION INTRAVENOUS CONTINUOUS
Status: DISCONTINUED | OUTPATIENT
Start: 2020-01-01 | End: 2020-01-01

## 2020-01-01 RX ORDER — FLUTICASONE PROPIONATE 50 MCG
2 SPRAY, SUSPENSION (ML) NASAL DAILY
Status: DISCONTINUED | OUTPATIENT
Start: 2020-01-01 | End: 2020-01-01 | Stop reason: HOSPADM

## 2020-01-01 RX ORDER — SODIUM CHLORIDE 0.9 % (FLUSH) 0.9 %
5-40 SYRINGE (ML) INJECTION AS NEEDED
Status: DISCONTINUED | OUTPATIENT
Start: 2020-01-01 | End: 2020-01-01 | Stop reason: HOSPADM

## 2020-01-01 RX ORDER — PANTOPRAZOLE SODIUM 40 MG/1
40 TABLET, DELAYED RELEASE ORAL
Status: DISCONTINUED | OUTPATIENT
Start: 2020-01-01 | End: 2020-01-01 | Stop reason: HOSPADM

## 2020-01-01 RX ORDER — ISOSORBIDE MONONITRATE 30 MG/1
30 TABLET, EXTENDED RELEASE ORAL DAILY
Status: DISCONTINUED | OUTPATIENT
Start: 2020-01-01 | End: 2020-01-01 | Stop reason: HOSPADM

## 2020-01-01 RX ORDER — SODIUM CHLORIDE 9 MG/ML
75 INJECTION, SOLUTION INTRAVENOUS CONTINUOUS
Status: DISCONTINUED | OUTPATIENT
Start: 2020-01-01 | End: 2020-01-01

## 2020-01-01 RX ORDER — ASPIRIN 81 MG/1
81 TABLET ORAL DAILY
Status: DISCONTINUED | OUTPATIENT
Start: 2020-01-01 | End: 2020-01-01 | Stop reason: HOSPADM

## 2020-01-01 RX ORDER — LANOLIN ALCOHOL/MO/W.PET/CERES
400 CREAM (GRAM) TOPICAL DAILY
Status: DISCONTINUED | OUTPATIENT
Start: 2020-01-01 | End: 2020-01-01 | Stop reason: HOSPADM

## 2020-01-01 RX ORDER — ONDANSETRON 2 MG/ML
4 INJECTION INTRAMUSCULAR; INTRAVENOUS
Status: DISCONTINUED | OUTPATIENT
Start: 2020-01-01 | End: 2020-01-01 | Stop reason: HOSPADM

## 2020-01-01 RX ORDER — MORPHINE SULFATE 2 MG/ML
4 INJECTION, SOLUTION INTRAMUSCULAR; INTRAVENOUS
Status: DISCONTINUED | OUTPATIENT
Start: 2020-01-01 | End: 2020-01-01

## 2020-01-01 RX ORDER — SODIUM CHLORIDE 0.9 % (FLUSH) 0.9 %
10 SYRINGE (ML) INJECTION
Status: DISCONTINUED | OUTPATIENT
Start: 2020-01-01 | End: 2020-01-01

## 2020-01-01 RX ORDER — SODIUM CHLORIDE 0.9 % (FLUSH) 0.9 %
5-40 SYRINGE (ML) INJECTION EVERY 8 HOURS
Status: DISCONTINUED | OUTPATIENT
Start: 2020-01-01 | End: 2020-01-01 | Stop reason: HOSPADM

## 2020-01-01 RX ORDER — SODIUM CHLORIDE 0.9 % (FLUSH) 0.9 %
SYRINGE (ML) INJECTION
Status: COMPLETED
Start: 2020-01-01 | End: 2020-01-01

## 2020-01-01 RX ORDER — METOPROLOL TARTRATE 25 MG/1
12.5 TABLET, FILM COATED ORAL 2 TIMES DAILY
Status: DISCONTINUED | OUTPATIENT
Start: 2020-01-01 | End: 2020-01-01 | Stop reason: HOSPADM

## 2020-01-01 RX ORDER — GABAPENTIN 100 MG/1
200 CAPSULE ORAL 3 TIMES DAILY
Qty: 90 CAP | Refills: 0 | Status: SHIPPED | OUTPATIENT
Start: 2020-01-01

## 2020-01-01 RX ORDER — BISACODYL 5 MG
5 TABLET, DELAYED RELEASE (ENTERIC COATED) ORAL DAILY PRN
Status: DISCONTINUED | OUTPATIENT
Start: 2020-01-01 | End: 2020-01-01 | Stop reason: HOSPADM

## 2020-01-01 RX ORDER — ACETAMINOPHEN 500 MG
1000 TABLET ORAL ONCE
Status: COMPLETED | OUTPATIENT
Start: 2020-01-01 | End: 2020-01-01

## 2020-01-01 RX ORDER — HEPARIN SODIUM 5000 [USP'U]/ML
5000 INJECTION, SOLUTION INTRAVENOUS; SUBCUTANEOUS EVERY 8 HOURS
Status: DISCONTINUED | OUTPATIENT
Start: 2020-01-01 | End: 2020-01-01 | Stop reason: HOSPADM

## 2020-01-01 RX ORDER — METOPROLOL TARTRATE 25 MG/1
TABLET, FILM COATED ORAL
Qty: 90 TAB | Refills: 0 | Status: SHIPPED | OUTPATIENT
Start: 2020-01-01 | End: 2020-01-01

## 2020-01-01 RX ORDER — MICONAZOLE NITRATE 20 MG/G
CREAM TOPICAL 2 TIMES DAILY
Status: DISCONTINUED | OUTPATIENT
Start: 2020-01-01 | End: 2020-01-01 | Stop reason: HOSPADM

## 2020-01-01 RX ORDER — BUMETANIDE 1 MG/1
1 TABLET ORAL DAILY
Status: CANCELLED | OUTPATIENT
Start: 2020-01-01

## 2020-01-01 RX ORDER — TRAMADOL HYDROCHLORIDE 50 MG/1
50 TABLET ORAL
Status: DISCONTINUED | OUTPATIENT
Start: 2020-01-01 | End: 2020-01-01 | Stop reason: HOSPADM

## 2020-01-01 RX ORDER — DOXAZOSIN 2 MG/1
2 TABLET ORAL DAILY
Status: DISCONTINUED | OUTPATIENT
Start: 2020-01-01 | End: 2020-01-01 | Stop reason: HOSPADM

## 2020-01-01 RX ORDER — ALBUTEROL SULFATE 90 UG/1
2 AEROSOL, METERED RESPIRATORY (INHALATION)
Status: DISCONTINUED | OUTPATIENT
Start: 2020-01-01 | End: 2020-01-01

## 2020-01-01 RX ADMIN — DOXAZOSIN 2 MG: 2 TABLET ORAL at 08:58

## 2020-01-01 RX ADMIN — TICAGRELOR 60 MG: 60 TABLET ORAL at 18:59

## 2020-01-01 RX ADMIN — Medication 10 ML: at 09:50

## 2020-01-01 RX ADMIN — METOPROLOL TARTRATE 12.5 MG: 25 TABLET ORAL at 18:19

## 2020-01-01 RX ADMIN — SODIUM CHLORIDE 50 ML/HR: 900 INJECTION, SOLUTION INTRAVENOUS at 18:00

## 2020-01-01 RX ADMIN — TICAGRELOR 60 MG: 60 TABLET ORAL at 06:41

## 2020-01-01 RX ADMIN — ISOSORBIDE MONONITRATE 30 MG: 30 TABLET, EXTENDED RELEASE ORAL at 09:12

## 2020-01-01 RX ADMIN — TRAMADOL HYDROCHLORIDE 50 MG: 50 TABLET, FILM COATED ORAL at 05:20

## 2020-01-01 RX ADMIN — HEPARIN SODIUM 5000 UNITS: 5000 INJECTION INTRAVENOUS; SUBCUTANEOUS at 05:27

## 2020-01-01 RX ADMIN — SODIUM CHLORIDE 75 ML/HR: 900 INJECTION, SOLUTION INTRAVENOUS at 00:11

## 2020-01-01 RX ADMIN — DOXAZOSIN 2 MG: 2 TABLET ORAL at 09:18

## 2020-01-01 RX ADMIN — HEPARIN SODIUM 5000 UNITS: 5000 INJECTION INTRAVENOUS; SUBCUTANEOUS at 15:27

## 2020-01-01 RX ADMIN — HEPARIN SODIUM 5000 UNITS: 5000 INJECTION INTRAVENOUS; SUBCUTANEOUS at 06:34

## 2020-01-01 RX ADMIN — Medication 10 ML: at 06:41

## 2020-01-01 RX ADMIN — FLUTICASONE PROPIONATE 2 SPRAY: 50 SPRAY, METERED NASAL at 09:13

## 2020-01-01 RX ADMIN — Medication 10 ML: at 06:34

## 2020-01-01 RX ADMIN — Medication 400 MG: at 09:02

## 2020-01-01 RX ADMIN — HEPARIN SODIUM 5000 UNITS: 5000 INJECTION INTRAVENOUS; SUBCUTANEOUS at 06:26

## 2020-01-01 RX ADMIN — Medication 400 MG: at 08:56

## 2020-01-01 RX ADMIN — LATANOPROST 1 DROP: 50 SOLUTION OPHTHALMIC at 21:19

## 2020-01-01 RX ADMIN — HEPARIN SODIUM 5000 UNITS: 5000 INJECTION INTRAVENOUS; SUBCUTANEOUS at 15:56

## 2020-01-01 RX ADMIN — PANTOPRAZOLE SODIUM 40 MG: 40 TABLET, DELAYED RELEASE ORAL at 06:30

## 2020-01-01 RX ADMIN — DOXAZOSIN 2 MG: 2 TABLET ORAL at 09:02

## 2020-01-01 RX ADMIN — TICAGRELOR 60 MG: 60 TABLET ORAL at 06:05

## 2020-01-01 RX ADMIN — FLUTICASONE PROPIONATE 2 SPRAY: 50 SPRAY, METERED NASAL at 09:04

## 2020-01-01 RX ADMIN — SODIUM CHLORIDE 1000 ML: 900 INJECTION, SOLUTION INTRAVENOUS at 01:59

## 2020-01-01 RX ADMIN — Medication 10 ML: at 21:17

## 2020-01-01 RX ADMIN — Medication 10 ML: at 21:18

## 2020-01-01 RX ADMIN — LATANOPROST 1 DROP: 50 SOLUTION OPHTHALMIC at 21:17

## 2020-01-01 RX ADMIN — MICONAZOLE NITRATE: 20 CREAM TOPICAL at 09:20

## 2020-01-01 RX ADMIN — Medication 10 ML: at 16:32

## 2020-01-01 RX ADMIN — HEPARIN SODIUM 5000 UNITS: 5000 INJECTION INTRAVENOUS; SUBCUTANEOUS at 05:36

## 2020-01-01 RX ADMIN — PANTOPRAZOLE SODIUM 40 MG: 40 TABLET, DELAYED RELEASE ORAL at 06:34

## 2020-01-01 RX ADMIN — ACETAMINOPHEN 1000 MG: 500 TABLET ORAL at 00:51

## 2020-01-01 RX ADMIN — Medication 400 MG: at 09:11

## 2020-01-01 RX ADMIN — ASPIRIN 81 MG: 81 TABLET ORAL at 09:17

## 2020-01-01 RX ADMIN — METOPROLOL TARTRATE 12.5 MG: 25 TABLET ORAL at 09:02

## 2020-01-01 RX ADMIN — ASPIRIN 81 MG: 81 TABLET ORAL at 09:02

## 2020-01-01 RX ADMIN — ONDANSETRON 4 MG: 2 INJECTION INTRAMUSCULAR; INTRAVENOUS at 00:51

## 2020-01-01 RX ADMIN — Medication 10 ML: at 15:27

## 2020-01-01 RX ADMIN — SODIUM CHLORIDE 50 ML/HR: 900 INJECTION, SOLUTION INTRAVENOUS at 09:22

## 2020-01-01 RX ADMIN — Medication 10 ML: at 22:00

## 2020-01-01 RX ADMIN — FLUTICASONE PROPIONATE 2 SPRAY: 50 SPRAY, METERED NASAL at 09:20

## 2020-01-01 RX ADMIN — METOPROLOL TARTRATE 12.5 MG: 25 TABLET ORAL at 18:10

## 2020-01-01 RX ADMIN — Medication 10 ML: at 15:55

## 2020-01-01 RX ADMIN — PANTOPRAZOLE SODIUM 40 MG: 40 TABLET, DELAYED RELEASE ORAL at 09:11

## 2020-01-01 RX ADMIN — METOPROLOL TARTRATE 12.5 MG: 25 TABLET ORAL at 08:59

## 2020-01-01 RX ADMIN — TICAGRELOR 60 MG: 60 TABLET ORAL at 17:56

## 2020-01-01 RX ADMIN — LATANOPROST 1 DROP: 50 SOLUTION OPHTHALMIC at 22:00

## 2020-01-01 RX ADMIN — Medication 400 MG: at 09:18

## 2020-01-01 RX ADMIN — ASPIRIN 81 MG: 81 TABLET ORAL at 08:56

## 2020-01-01 RX ADMIN — METOPROLOL TARTRATE 12.5 MG: 25 TABLET ORAL at 18:45

## 2020-01-01 RX ADMIN — HEPARIN SODIUM 5000 UNITS: 5000 INJECTION INTRAVENOUS; SUBCUTANEOUS at 21:38

## 2020-01-01 RX ADMIN — METOPROLOL TARTRATE 12.5 MG: 25 TABLET ORAL at 09:11

## 2020-01-01 RX ADMIN — HEPARIN SODIUM 5000 UNITS: 5000 INJECTION INTRAVENOUS; SUBCUTANEOUS at 21:18

## 2020-01-01 RX ADMIN — MICONAZOLE NITRATE: 20 CREAM TOPICAL at 10:25

## 2020-01-01 RX ADMIN — MORPHINE SULFATE 4 MG: 2 INJECTION, SOLUTION INTRAMUSCULAR; INTRAVENOUS at 01:59

## 2020-01-01 RX ADMIN — Medication 10 ML: at 05:20

## 2020-01-01 RX ADMIN — HEPARIN SODIUM 5000 UNITS: 5000 INJECTION INTRAVENOUS; SUBCUTANEOUS at 16:29

## 2020-01-01 RX ADMIN — MICONAZOLE NITRATE: 20 CREAM TOPICAL at 18:00

## 2020-01-01 RX ADMIN — HEPARIN SODIUM 5000 UNITS: 5000 INJECTION INTRAVENOUS; SUBCUTANEOUS at 21:16

## 2020-01-01 RX ADMIN — ISOSORBIDE MONONITRATE 30 MG: 30 TABLET, EXTENDED RELEASE ORAL at 09:18

## 2020-01-01 RX ADMIN — FLUTICASONE PROPIONATE 2 SPRAY: 50 SPRAY, METERED NASAL at 10:24

## 2020-01-01 RX ADMIN — HEPARIN SODIUM 5000 UNITS: 5000 INJECTION INTRAVENOUS; SUBCUTANEOUS at 15:54

## 2020-01-01 RX ADMIN — DOXAZOSIN 2 MG: 2 TABLET ORAL at 09:12

## 2020-01-01 RX ADMIN — TICAGRELOR 60 MG: 60 TABLET ORAL at 06:29

## 2020-01-01 RX ADMIN — Medication 10 ML: at 05:36

## 2020-01-01 RX ADMIN — ISOSORBIDE MONONITRATE 30 MG: 30 TABLET, EXTENDED RELEASE ORAL at 09:02

## 2020-01-01 RX ADMIN — HEPARIN SODIUM 5000 UNITS: 5000 INJECTION INTRAVENOUS; SUBCUTANEOUS at 22:06

## 2020-01-01 RX ADMIN — ASPIRIN 81 MG: 81 TABLET ORAL at 09:11

## 2020-01-01 RX ADMIN — Medication 10 ML: at 21:38

## 2020-01-01 RX ADMIN — METOPROLOL TARTRATE 12.5 MG: 25 TABLET ORAL at 09:18

## 2020-01-01 RX ADMIN — LATANOPROST 1 DROP: 50 SOLUTION OPHTHALMIC at 21:38

## 2020-01-01 RX ADMIN — Medication 10 ML: at 15:57

## 2020-01-01 RX ADMIN — TICAGRELOR 60 MG: 60 TABLET ORAL at 09:12

## 2020-01-01 RX ADMIN — ISOSORBIDE MONONITRATE 30 MG: 30 TABLET, EXTENDED RELEASE ORAL at 08:58

## 2020-01-01 RX ADMIN — SODIUM CHLORIDE 75 ML/HR: 900 INJECTION, SOLUTION INTRAVENOUS at 09:49

## 2020-01-01 RX ADMIN — TICAGRELOR 60 MG: 60 TABLET ORAL at 18:09

## 2020-02-15 PROBLEM — R79.89 ELEVATED LFTS: Status: ACTIVE | Noted: 2020-01-01

## 2020-02-15 NOTE — PROGRESS NOTES
Attending physician Dr. Ofe Reyes paged via perfect serve to advise of lab results. Troponin 0.12 and Lactic acid 3.0. Per perfect serve Dr. Flor Ferguson is still the attending. Per Dr. Flor Ferguson he is not. Number on Kardex for Dr. Ofe Reyes isn't correct nor os he listed on call via the hospitalist call service. 1 - Spoke with Dr. Alejandrina Cao who helped me find assigned physician - Dr. Merlin Franco. Will page to update on pt care. [id number] - Dr. Angelina Cobb called back per consult. LFT lab results provided. Telephone order given for labs (Ptt/INR and MO). Was also advised that pt's cardiologist needs to be consulted. 1000 - Dr. Merlin Franco paged via perfect serve but it routes back to Dr. Flor Ferguson. Dr. Merlin Franco paged via hospitalist answering service to update on pt status. 1132 - second page to Dr. Merlin Franco; no call back from first page. 1135 - Echo completed 46 - Dr. Merlin Franco called back. Will review pt status. No orders given 1327 - Three total attempts made to draw labs as ordered by two nurses which were unsuccessful. PICC team called. 200 - Dr. Rajesh Graf called back per consult and advised of troponin of 0.16. He is aware and will round on pt tomorrow. No orders given

## 2020-02-15 NOTE — ED PROVIDER NOTES
EMERGENCY DEPARTMENT HISTORY AND PHYSICAL EXAM 
 
 
Date: 2/14/2020 Patient Name: Arnol Gann Patient Age and Sex: 80 y.o. female History of Presenting Illness Chief Complaint Patient presents with  Abdominal Pain  
  onset of sxs, yesterday - abd pain with nausea and vomitting, x2 - Denies fevers / sick contacts / abd surgeries History Provided By: Patient HPI: Arnol Gann Is a 31-year-old female with past medical history of diabetes, CAD, hypertension presenting today with abdominal discomfort. The patient reports that she started having abdominal pain today. She states that this is all over her abdomen. She has had multiple episodes of nonbloody nonbilious emesis. She also endorses nausea. No previous abdominal surgeries. No fevers. No change in her bowel habits. She denies dysuria. She is unsure whether or not she is ever had abdominal surgery. There are no other complaints, changes, or physical findings at this time. PCP: Hugo Lang NP No current facility-administered medications on file prior to encounter. Current Outpatient Medications on File Prior to Encounter Medication Sig Dispense Refill  metoprolol tartrate (LOPRESSOR) 25 mg tablet TAKE 1/2 TABLET BY MOUTH TWICE A DAY 90 Tab 0  
 isosorbide mononitrate ER (IMDUR) 30 mg tablet take 1 tablet by mouth once daily TO HELP PEVENT CHEST PAINS 90 Tab 3  
 BRILINTA 60 mg tab tablet take 1 tablet by mouth twice a day 60 Tab 11  
 busPIRone (BUSPAR) 5 mg tablet Take 5 mg by mouth two (2) times a day.  0  
 polyethylene glycol (MIRALAX) 17 gram/dose powder Take 17 g by mouth daily as needed. 1 tablespoon with 8 oz of water daily 119 g 0  
 morphine sulfate/PF (MORPHINE, PF,) soln by Intrathecal route continuous. CREAM APPLIED DAILY FOR FOOT PAIN    
 Cetirizine (ZYRTEC) 10 mg cap Take  by mouth daily.  doxazosin (CARDURA) 2 mg tablet Take 2 mg by mouth daily.   1  
  gabapentin (NEURONTIN) 100 mg capsule Take 2 Caps by mouth three (3) times daily. 180 Cap 1  
 bumetanide (BUMEX) 1 mg tablet Take 1 Tab by mouth daily. (Patient taking differently: Take 1 mg by mouth daily. 1 and 1/2 daily) 30 Tab 1  
 magnesium oxide (MAG-OX) 400 mg tablet Take 1 Tab by mouth daily. 30 Tab 1  
 pantoprazole (PROTONIX) 40 mg tablet Take 1 Tab by mouth Daily (before breakfast). 30 Tab 1  
 traMADol (ULTRAM) 50 mg tablet Take 1 Tab by mouth every six (6) hours as needed for Pain. Max Daily Amount: 200 mg. 40 Tab 0  
 ferrous sulfate 325 mg (65 mg iron) tablet Take 1 Tab by mouth two (2) times daily (with meals). 60 Tab 1  
 acetaminophen (TYLENOL) 325 mg tablet Take 1,300 mg by mouth nightly.  allopurinol (ZYLOPRIM) 100 mg tablet Take  by mouth daily.  aspirin delayed-release 81 mg tablet Take 81 mg by mouth daily.  albuterol (PROAIR HFA) 90 mcg/actuation inhaler Take 2 Puffs by inhalation every six (6) hours as needed for Wheezing. 2 PUFFS 4 TIMES DAILY  pravastatin (PRAVACHOL) 20 mg tablet Take 1 Tab by mouth nightly. 30 Tab 11  
 potassium chloride SR (KLOR-CON 10) 10 mEq tablet   1  
 diphenoxylate-atropine (LOMOTIL) 2.5-0.025 mg per tablet Take 1 Tab by mouth four (4) times daily as needed for Diarrhea (1 tab after each stool for max 8 per day). Max Daily Amount: 4 Tabs. Take after each stool for a maximum of 8 tablets daily 20 Tab 0  
 nitroglycerin (NITROSTAT) 0.4 mg SL tablet place 1 tablet under the tongue if needed every 5 minutes for chest pain for 3 doses IF NO RELIEF AFTER 3RD DOSE CALL PRESCRIBER . 25 Tab 1  
 nystatin (MYCOSTATIN) 100,000 unit/gram ointment Apply  to affected area two (2) times a day.  triamcinolone acetonide (KENALOG) 0.5 % ointment Apply  to affected area two (2) times a day. use thin layer  dextran 70-hypromellose (ARTIFICIAL TEARS) ophthalmic solution Administer 1 Drop to both eyes as needed.  fluticasone (FLONASE) 50 mcg/actuation nasal spray 2 Sprays by Both Nostrils route daily.  latanoprost (XALATAN) 0.005 % ophthalmic solution Administer 1 Drop to both eyes nightly. Past History Past Medical History: 
Past Medical History:  
Diagnosis Date  Asthma  CAD (coronary artery disease)  Diabetes ([address]  Hypertension  Irregular heart rate 4/25/13  Morbid obesity ([address]  Other ill-defined conditions(799.89)   
 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 
Past Surgical History:  
Procedure Laterality Date  COLONOSCOPY N/A 8/15/2016 COLONOSCOPY performed by Zaida Wilkins MD at [facility]  
 HX CHOLECYSTECTOMY  HX CORONARY STENT PLACEMENT    
 HX HEART CATHETERIZATION    
 HX PACEMAKER    
 HX PACEMAKER PLACEMENT Left Family History: 
History reviewed. No pertinent family history. Social History: 
Social History Tobacco Use  Smoking status: Never Smoker  Smokeless tobacco: Never Used Substance Use Topics  Alcohol use: No  
 Drug use: No  
 
 
Allergies: Allergies Allergen Reactions  Pcn [Penicillins] Unknown (comments) Review of Systems Constitutional: No  fever,  No  headache Skin: No  rash, No  jaundice HEENT: No  nasal congestion, No  eye drainage. Resp: No cough,  No  wheezing CV: No chest pain, No  palpitations GI: + vomiting,  No  diarrhea.,  No  constipation : No dysuria,  No  hematuria MSK: No joint pain,  No  trauma Neuro: No numbness, No  tingling Psych: No suicidal, No  paranoid Physical Exam  
 
Patient Vitals for the past 12 hrs: 
 Temp Pulse Resp BP SpO2  
02/14/20 2339 97.9 °F (36.6 °C) 88 18 105/52 99 % General: alert, No acute distress Eyes: EOMI, normal conjunctiva ENT: moist mucous membranes. Neck: Active, full ROM of neck. Skin: No rashes. no jaundice Lungs: Equal chest expansion. no respiratory distress. clear to auscultation bilaterally No accessory muscle usage Heart: regular rate     no peripheral edema   2+ radial pulses and DPs bilaterally Abd:  non distended soft, Generalized tenderness. No rebound tenderness. No guarding Back: Full ROM 
MSK: Full, active ROM in all 4 extremities. Neuro: Person, Place, Time and Situation; normal speech;  
Psych: Cooperative with exam; Appropriate mood and affect Diagnostic Study Results Labs - Recent Results (from the past 12 hour(s)) EKG, 12 LEAD, INITIAL Collection Time: 02/15/20 12:22 AM  
Result Value Ref Range Ventricular Rate 71 BPM  
 Atrial Rate 71 BPM  
 QRS Duration 68 ms Q-T Interval 398 ms QTC Calculation (Bezet) 432 ms Calculated R Axis -3 degrees Calculated T Axis -2 degrees Diagnosis Accelerated Junctional rhythm with frequent ventricular-paced complexes Inferior infarct , age undetermined When compared with ECG of 02-MAR-2019 17:42, 
Vent. rate has increased BY  10 BPM 
  
CBC WITH AUTOMATED DIFF Collection Time: 02/15/20 12:30 AM  
Result Value Ref Range WBC 6.2 3.6 - 11.0 K/uL  
 RBC 3.76 (L) 3.80 - 5.20 M/uL  
 HGB 12.1 11.5 - 16.0 g/dL HCT 37.8 35.0 - 47.0 % .5 (H) 80.0 - 99.0 FL  
 MCH 32.2 26.0 - 34.0 PG  
 MCHC 32.0 30.0 - 36.5 g/dL  
 RDW 13.7 11.5 - 14.5 % PLATELET 550 183 - 044 K/uL MPV 9.6 8.9 - 12.9 FL  
 NRBC 0.0 0  WBC ABSOLUTE NRBC 0.00 0.00 - 0.01 K/uL NEUTROPHILS 77 (H) 32 - 75 % LYMPHOCYTES 17 12 - 49 % MONOCYTES 4 (L) 5 - 13 % EOSINOPHILS 1 0 - 7 % BASOPHILS 0 0 - 1 % IMMATURE GRANULOCYTES 1 (H) 0.0 - 0.5 % ABS. NEUTROPHILS 4.8 1.8 - 8.0 K/UL  
 ABS. LYMPHOCYTES 1.1 0.8 - 3.5 K/UL  
 ABS. MONOCYTES 0.2 0.0 - 1.0 K/UL  
 ABS. EOSINOPHILS 0.0 0.0 - 0.4 K/UL  
 ABS. BASOPHILS 0.0 0.0 - 0.1 K/UL  
 ABS. IMM. GRANS. 0.1 (H) 0.00 - 0.04 K/UL  
 DF AUTOMATED METABOLIC PANEL, COMPREHENSIVE Collection Time: 02/15/20 12:30 AM  
Result Value Ref Range Sodium 138 136 - 145 mmol/L Potassium 4.2 3.5 - 5.1 mmol/L Chloride 104 97 - 108 mmol/L  
 CO2 27 21 - 32 mmol/L Anion gap 7 5 - 15 mmol/L Glucose 241 (H) 65 - 100 mg/dL BUN 41 (H) 6 - 20 MG/DL Creatinine 1.55 (H) 0.55 - 1.02 MG/DL  
 BUN/Creatinine ratio 26 (H) 12 - 20 GFR est AA 38 (L) >60 ml/min/1.73m2 GFR est non-AA 31 (L) >60 ml/min/1.73m2 Calcium 10.7 (H) 8.5 - 10.1 MG/DL Bilirubin, total 1.0 0.2 - 1.0 MG/DL  
 ALT (SGPT) 771 (H) 12 - 78 U/L  
 AST (SGOT) 1,941 (H) 15 - 37 U/L Alk. phosphatase 406 (H) 45 - 117 U/L Protein, total 7.5 6.4 - 8.2 g/dL Albumin 3.2 (L) 3.5 - 5.0 g/dL Globulin 4.3 (H) 2.0 - 4.0 g/dL A-G Ratio 0.7 (L) 1.1 - 2.2    
TROPONIN I Collection Time: 02/15/20 12:30 AM  
Result Value Ref Range Troponin-I, Qt. 0.12 (H) <0.05 ng/mL LIPASE Collection Time: 02/15/20 12:30 AM  
Result Value Ref Range Lipase 127 73 - 393 U/L  
TROPONIN I Collection Time: 02/15/20  4:09 AM  
Result Value Ref Range Troponin-I, Qt. 0.12 (H) <0.05 ng/mL SAMPLES BEING HELD Collection Time: 02/15/20  4:09 AM  
Result Value Ref Range SAMPLES BEING HELD PST COMMENT Add-on orders for these samples will be processed based on acceptable specimen integrity and analyte stability, which may vary by analyte. URINALYSIS W/ REFLEX CULTURE Collection Time: 02/15/20  4:41 AM  
Result Value Ref Range Color YELLOW/STRAW Appearance CLEAR CLEAR Specific gravity 1.020 1.003 - 1.030    
 pH (UA) 6.0 5.0 - 8.0 Protein 30 (A) NEG mg/dL Glucose NEGATIVE  NEG mg/dL Ketone NEGATIVE  NEG mg/dL Blood NEGATIVE  NEG Urobilinogen 2.0 (H) 0.2 - 1.0 EU/dL Nitrites NEGATIVE  NEG Leukocyte Esterase NEGATIVE  NEG    
 WBC 0-4 0 - 4 /hpf  
 RBC 0-5 0 - 5 /hpf Epithelial cells FEW FEW /lpf  Bacteria NEGATIVE  NEG /hpf  
 UA: UC IF INDICATED CULTURE NOT INDICATED BY UA RESULT CNI Hyaline cast 2-5 0 - 5 /lpf  
BILIRUBIN, CONFIRM Collection Time: 02/15/20  4:41 AM  
Result Value Ref Range Bilirubin UA, confirm NEGATIVE  NEG    
ACETAMINOPHEN Collection Time: 02/15/20  6:01 AM  
Result Value Ref Range Acetaminophen level 14 10 - 30 ug/mL Radiologic Studies -  
CT ABD PELV WO CONT Final Result IMPRESSION:  
Sigmoid diverticulosis Nonspecific gas pattern US ABD LTD Final Result IMPRESSION: Normal right upper quadrant ultrasound CT Results  (Last 48 hours) 02/15/20 0437  CT ABD PELV WO CONT Final result Impression:  IMPRESSION:  
Sigmoid diverticulosis Nonspecific gas pattern Narrative:  EXAM: CT ABD PELV WO CONT INDICATION: Abdominal pain, nausea, vomiting. Symptoms present for 2 days. COMPARISON: 11/13/2018 CONTRAST:  None. TECHNIQUE:   
Thin axial images were obtained through the abdomen and pelvis. Coronal and  
sagittal reconstructions were generated. Oral contrast was not administered. CT  
dose reduction was achieved through use of a standardized protocol tailored for  
this examination and automatic exposure control for dose modulation. The absence of intravenous contrast material reduces the sensitivity for  
evaluation of the solid parenchymal organs of the abdomen. FINDINGS:   
LUNG BASES: Clear. INCIDENTALLY IMAGED HEART AND MEDIASTINUM: Unremarkable. Coronary artery  
calcification. LIVER: No mass or biliary dilatation. GALLBLADDER: Surgically absent SPLEEN: No mass. PANCREAS: No mass or ductal dilatation. ADRENALS: Unremarkable. KIDNEYS/URETERS: No mass, calculus, or hydronephrosis. STOMACH: Unremarkable. SMALL BOWEL: No dilatation or wall thickening. COLON: No dilatation or wall thickening. Sigmoid diverticulosis APPENDIX: Not visualized convincing only, possibly present on coronal image 62. PERITONEUM: No ascites or pneumoperitoneum. RETROPERITONEUM: No lymphadenopathy or aortic aneurysm. REPRODUCTIVE ORGANS: Uterus is surgically absent URINARY BLADDER: No mass or calculus. BONES: No destructive bone lesion. Lower lumbar facet arthropathy. Disc  
desiccation L4-5. ADDITIONAL COMMENTS: N/A  
   
  
  
 
CXR Results  (Last 48 hours) None Medical Decision Making Differential Diagnosis: Pancreatitis, hepatitis, viral gastroenteritis, colitis, cholecystitis I reviewed the vital signs, available nursing notes, past medical history, past surgical history, family history and social history and old medical records. On my interpretation, Laboratory workup is significant for elevated LFTs with an AST nearly 2000, and , no elevation in bilirubin, creatinine is 1.55, no elevation white blood cell count On my interpretation of the radiology studies CT the abdomen pelvis shows diverticulosis without evidence of diverticulitis, right upper quadrant ultrasound with absence of the gallbladder, and normal bile duct On my interpretation of the EKG junctional rhythm with PVCs and intermittent paced complexes, rate is 71, QTC is 432, no ST elevation or depression. Management/ED course: Patient presents today with abdominal discomfort, nausea, and vomiting. Found to have elevated LFTs. She does not use alcohol, and has not been taking significant amount of Tylenol. She did have improvement in pain after analgesics and fluids. Ultimately is admitted to the hospitalist service for this transaminitis. ED Course:  
Initial assessment performed. The patients presenting problems have been discussed, and they are in agreement with the care plan formulated and outlined with them. I have encouraged them to ask questions as they arise throughout their visit. Procedures: 
0 Disposition: Admitted Admission Note: Patient is being admitted to the hospital by Service: Hospitalist.  The results of their tests and reasons for their admission have been discussed with them and available family. They convey agreement and understanding for the need to be admitted and for their admission diagnosis. Diagnosis Clinical Impression: 1. Transaminitis 2. Abdominal pain, right upper quadrant 3. Non-intractable vomiting with nausea, unspecified vomiting type Attestations: 
Rmairo Mccoy MD 
 
 
 
Please note that this dictation was completed with [employer], the computer voice recognition software. Quite often unanticipated grammatical, syntax, homophones, and other interpretive errors are inadvertently transcribed by the computer software. Please disregard these errors. Please excuse any errors that have escaped final proofreading. Thank you.

## 2020-02-15 NOTE — H&P
Hospitalist Admission Note NAME: Dean Avelar :  1927 MRN:  [id number] Date/Time:  2/15/2020 6:13 AM 
 
Patient PCP: Robert Cleaning, RADHA 
______________________________________________________________________ Given the patient's current clinical presentation, I have a high level of concern for decompensation if discharged from the emergency department. Complex decision making was performed, which includes reviewing the patient's available past medical records, laboratory results, and x-ray films. My assessment of this patient's clinical condition and my plan of care is as follows. Assessment / Plan: 
 
Elevated LFTs cause unknown Admit patient to telemetry  GI consultation  Check ammonia level Check hepatitis profile  Check PT/INR 
 
HTN continue home antihypertensive medication Hyperlipidemiahold statin because of elevated LFT Elevated troponin  Monitor troponin 
-Echocardiogram 
 
History of coronary artery disease Continue aspirin CHF diastolic chronichold Bumex worsening renal function Acute on chronic renal failure -IV fluid  
-monitoring Code Status: FULL Surrogate Decision Maker:Alma Rosa Khalil DVT Prophylaxis: HEPARIN  
GI Prophylaxis: not indicated Subjective: CHIEF COMPLAINT: ABDOMEN PAIN  
 
HISTORY OF PRESENT ILLNESS:    
80years old female from home with past medical history significant for diabetes, coronary artery disease, hypertension presented to the hospital for evaluation of abdominal pain started since yesterday associated with nausea vomiting denies any fever any chills denies any diarrhea, CT abdomen was done and it was negative for any acute abnormality, blood work was done was significant for elevated LFT. We were asked to admit for work up and evaluation of the above problems. Past Medical History:  
Diagnosis Date  Asthma  CAD (coronary artery disease)  Diabetes ([facility] [address]  Hypertension  Irregular heart rate 4/25/13  Morbid obesity ([facility] [address]  Other ill-defined conditions(799.89)   
 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  
Procedure Laterality Date  COLONOSCOPY N/A 8/15/2016 COLONOSCOPY performed by Monserrat Wilder MD at [facility] ENDOSCOPY  
 HX CHOLECYSTECTOMY  HX CORONARY STENT PLACEMENT    
 HX HEART CATHETERIZATION    
 HX PACEMAKER    
 HX PACEMAKER PLACEMENT Left Social History Tobacco Use  Smoking status: Never Smoker  Smokeless tobacco: Never Used Substance Use Topics  Alcohol use: No  
  
 
History reviewed. family history. HTN Allergies Allergen Reactions  Pcn [Penicillins] Unknown (comments) Prior to Admission medications Medication Sig Start Date End Date Taking? Authorizing Provider  
metoprolol tartrate (LOPRESSOR) 25 mg tablet TAKE 1/2 TABLET BY MOUTH TWICE A DAY 11/25/19  Yes Allen Cullen MD  
isosorbide mononitrate ER (IMDUR) 30 mg tablet take 1 tablet by mouth once daily TO HELP PEVENT CHEST PAINS 6/10/19  Yes Jesse Mckenzie NP  
BRILINTA 60 mg tab tablet take 1 tablet by mouth twice a day 5/1/19  Yes Jesse Mckenzie NP  
busPIRone (BUSPAR) 5 mg tablet Take 5 mg by mouth two (2) times a day. 2/16/19  Yes Provider, Historical  
polyethylene glycol (MIRALAX) 17 gram/dose powder Take 17 g by mouth daily as needed. 1 tablespoon with 8 oz of water daily 11/13/18  Yes Lori Garcia MD  
morphine sulfate/PF (MORPHINE, PF,) soln by Intrathecal route continuous. CREAM APPLIED DAILY FOR FOOT PAIN   Yes Provider, Historical  
Cetirizine (ZYRTEC) 10 mg cap Take  by mouth daily. Yes Provider, Historical  
doxazosin (CARDURA) 2 mg tablet Take 2 mg by mouth daily. 3/13/17  Yes Provider, Historical  
gabapentin (NEURONTIN) 100 mg capsule Take 2 Caps by mouth three (3) times daily.  8/18/16  Yes Christophe Marie MD  
 bumetanide (BUMEX) 1 mg tablet Take 1 Tab by mouth daily. Patient taking differently: Take 1 mg by mouth daily. 1 and 1/2 daily 8/18/16  Yes Alexis Brewer MD  
magnesium oxide (MAG-OX) 400 mg tablet Take 1 Tab by mouth daily. 8/18/16  Yes Alexis Brewer MD  
pantoprazole (PROTONIX) 40 mg tablet Take 1 Tab by mouth Daily (before breakfast). 8/18/16  Yes Alexis Brewer MD  
traMADol (ULTRAM) 50 mg tablet Take 1 Tab by mouth every six (6) hours as needed for Pain. Max Daily Amount: 200 mg. 8/18/16  Yes Alexis Brewer MD  
ferrous sulfate 325 mg (65 mg iron) tablet Take 1 Tab by mouth two (2) times daily (with meals). 8/18/16  Yes Alexis Brewer MD  
acetaminophen (TYLENOL) 325 mg tablet Take 1,300 mg by mouth nightly. Yes Provider, Historical  
allopurinol (ZYLOPRIM) 100 mg tablet Take  by mouth daily. Yes Provider, Historical  
aspirin delayed-release 81 mg tablet Take 81 mg by mouth daily. Yes Provider, Historical  
albuterol (PROAIR HFA) 90 mcg/actuation inhaler Take 2 Puffs by inhalation every six (6) hours as needed for Wheezing. 2 PUFFS 4 TIMES DAILY   Yes Provider, Historical  
pravastatin (PRAVACHOL) 20 mg tablet Take 1 Tab by mouth nightly. 4/10/13  Yes [place], NP  
potassium chloride SR (KLOR-CON 10) 10 mEq tablet  3/17/19   Provider, Historical  
diphenoxylate-atropine (LOMOTIL) 2.5-0.025 mg per tablet Take 1 Tab by mouth four (4) times daily as needed for Diarrhea (1 tab after each stool for max 8 per day). Max Daily Amount: 4 Tabs. Take after each stool for a maximum of 8 tablets daily 3/2/19   Solomon Hung MD  
nitroglycerin (NITROSTAT) 0.4 mg SL tablet place 1 tablet under the tongue if needed every 5 minutes for chest pain for 3 doses IF NO RELIEF AFTER 3RD DOSE CALL PRESCRIBER . 2/3/19   Yamini Martinez MD  
nystatin (MYCOSTATIN) 100,000 unit/gram ointment Apply  to affected area two (2) times a day.     Other, MD Jose R  
 triamcinolone acetonide (KENALOG) 0.5 % ointment Apply  to affected area two (2) times a day. use thin layer    Jose R Wu MD  
dextran 70-hypromellose (ARTIFICIAL TEARS) ophthalmic solution Administer 1 Drop to both eyes as needed. Provider, Historical  
fluticasone (FLONASE) 50 mcg/actuation nasal spray 2 Sprays by Both Nostrils route daily. Provider, Historical  
latanoprost (XALATAN) 0.005 % ophthalmic solution Administer 1 Drop to both eyes nightly. Jose R Wu MD  
 
 
REVIEW OF SYSTEMS:    
I am not able to complete the review of systems because: The patient is intubated and sedated The patient has altered mental status due to his acute medical problems The patient has baseline aphasia from prior stroke(s) The patient has baseline dementia and is not reliable historian The patient is in acute medical distress and unable to provide information Total of 12 systems reviewed as follows:   
   POSITIVE= underlined text  Negative = text not underlined General:  fever, chills, sweats, generalized weakness, weight loss/gain,  
   loss of appetite Eyes:    blurred vision, eye pain, loss of vision, double vision ENT:    rhinorrhea, pharyngitis Respiratory:   cough, sputum production, SOB, DOTSON, wheezing, pleuritic pain  
Cardiology:   chest pain, palpitations, orthopnea, PND, edema, syncope Gastrointestinal:  abdominal pain , N/V, diarrhea, dysphagia, constipation, bleeding Genitourinary:  frequency, urgency, dysuria, hematuria, incontinence Muskuloskeletal :  arthralgia, myalgia, back pain Hematology:  easy bruising, nose or gum bleeding, lymphadenopathy Dermatological: rash, ulceration, pruritis, color change / jaundice Endocrine:   hot flashes or polydipsia Neurological:  headache, dizziness, confusion, focal weakness, paresthesia, Speech difficulties, memory loss, gait difficulty Psychological: Feelings of anxiety, depression, agitation Objective: VITALS:   
Visit Vitals /52 (BP 1 Location: Right arm, BP Patient Position: Sitting) Pulse 88 Temp 97.9 °F (36.6 °C) Resp 18 SpO2 99% PHYSICAL EXAM: 
 
 
_______________________________________________________________________ Care Plan discussed with: 
  Comments Patient y Family RN y   
Care Manager Consultant:     
_______________________________________________________________________ Expected  Disposition:  
Home with Family y HH/PT/OT/RN   
SNF/LTC   
DAPHNIE   
________________________________________________________________________ TOTAL TIME:  60 Minutes Critical Care Provided     Minutes non procedure based Comments  
 y Reviewed previous records  
>50% of visit spent in counseling and coordination of care y Discussion with patient and/or family and questions answered 
  
 
________________________________________________________________________ Signed:  Erna Francisco MD 
 
Procedures: see electronic medical records for all procedures/Xrays and details which were not copied into this note but were reviewed prior to creation of Plan. LAB DATA REVIEWED:   
Recent Results (from the past 24 hour(s)) EKG, 12 LEAD, INITIAL Collection Time: 02/15/20 12:22 AM  
Result Value Ref Range Ventricular Rate 71 BPM  
 Atrial Rate 71 BPM  
 QRS Duration 68 ms Q-T Interval 398 ms QTC Calculation (Bezet) 432 ms Calculated R Axis -3 degrees Calculated T Axis -2 degrees Diagnosis Accelerated Junctional rhythm with frequent ventricular-paced complexes Inferior infarct , age undetermined When compared with ECG of 02-MAR-2019 17:42, 
Vent. rate has increased BY  10 BPM 
  
CBC WITH AUTOMATED DIFF Collection Time: 02/15/20 12:30 AM  
Result Value Ref Range WBC 6.2 3.6 - 11.0 K/uL  
 RBC 3.76 (L) 3.80 - 5.20 M/uL  
 HGB 12.1 11.5 - 16.0 g/dL HCT 37.8 35.0 - 47.0 % .5 (H) 80.0 - 99.0 FL  
 MCH 32.2 26.0 - 34.0 PG  
 MCHC 32.0 30.0 - 36.5 g/dL  
 RDW 13.7 11.5 - 14.5 % PLATELET 768 583 - 817 K/uL MPV 9.6 8.9 - 12.9 FL  
 NRBC 0.0 0  WBC ABSOLUTE NRBC 0.00 0.00 - 0.01 K/uL NEUTROPHILS 77 (H) 32 - 75 % LYMPHOCYTES 17 12 - 49 % MONOCYTES 4 (L) 5 - 13 % EOSINOPHILS 1 0 - 7 % BASOPHILS 0 0 - 1 % IMMATURE GRANULOCYTES 1 (H) 0.0 - 0.5 % ABS. NEUTROPHILS 4.8 1.8 - 8.0 K/UL  
 ABS. LYMPHOCYTES 1.1 0.8 - 3.5 K/UL  
 ABS. MONOCYTES 0.2 0.0 - 1.0 K/UL  
 ABS. EOSINOPHILS 0.0 0.0 - 0.4 K/UL  
 ABS. BASOPHILS 0.0 0.0 - 0.1 K/UL  
 ABS. IMM. GRANS. 0.1 (H) 0.00 - 0.04 K/UL  
 DF AUTOMATED METABOLIC PANEL, COMPREHENSIVE Collection Time: 02/15/20 12:30 AM  
Result Value Ref Range Sodium 138 136 - 145 mmol/L Potassium 4.2 3.5 - 5.1 mmol/L Chloride 104 97 - 108 mmol/L  
 CO2 27 21 - 32 mmol/L Anion gap 7 5 - 15 mmol/L Glucose 241 (H) 65 - 100 mg/dL BUN 41 (H) 6 - 20 MG/DL  Creatinine 1.55 (H) 0.55 - 1.02 MG/DL  
 BUN/Creatinine ratio 26 (H) 12 - 20    
 GFR est AA 38 (L) >60 ml/min/1.73m2 GFR est non-AA 31 (L) >60 ml/min/1.73m2 Calcium 10.7 (H) 8.5 - 10.1 MG/DL Bilirubin, total 1.0 0.2 - 1.0 MG/DL  
 ALT (SGPT) 771 (H) 12 - 78 U/L  
 AST (SGOT) 1,941 (H) 15 - 37 U/L Alk. phosphatase 406 (H) 45 - 117 U/L Protein, total 7.5 6.4 - 8.2 g/dL Albumin 3.2 (L) 3.5 - 5.0 g/dL Globulin 4.3 (H) 2.0 - 4.0 g/dL A-G Ratio 0.7 (L) 1.1 - 2.2    
TROPONIN I Collection Time: 02/15/20 12:30 AM  
Result Value Ref Range Troponin-I, Qt. 0.12 (H) <0.05 ng/mL LIPASE Collection Time: 02/15/20 12:30 AM  
Result Value Ref Range Lipase 127 73 - 393 U/L  
TROPONIN I Collection Time: 02/15/20  4:09 AM  
Result Value Ref Range Troponin-I, Qt. 0.12 (H) <0.05 ng/mL SAMPLES BEING HELD Collection Time: 02/15/20  4:09 AM  
Result Value Ref Range SAMPLES BEING HELD PST COMMENT Add-on orders for these samples will be processed based on acceptable specimen integrity and analyte stability, which may vary by analyte. URINALYSIS W/ REFLEX CULTURE Collection Time: 02/15/20  4:41 AM  
Result Value Ref Range Color YELLOW/STRAW Appearance CLEAR CLEAR Specific gravity 1.020 1.003 - 1.030    
 pH (UA) 6.0 5.0 - 8.0 Protein 30 (A) NEG mg/dL Glucose NEGATIVE  NEG mg/dL Ketone NEGATIVE  NEG mg/dL Blood NEGATIVE  NEG Urobilinogen 2.0 (H) 0.2 - 1.0 EU/dL Nitrites NEGATIVE  NEG Leukocyte Esterase NEGATIVE  NEG    
 WBC 0-4 0 - 4 /hpf  
 RBC 0-5 0 - 5 /hpf Epithelial cells FEW FEW /lpf Bacteria NEGATIVE  NEG /hpf  
 UA:UC IF INDICATED CULTURE NOT INDICATED BY UA RESULT CNI Hyaline cast 2-5 0 - 5 /lpf  
BILIRUBIN, CONFIRM Collection Time: 02/15/20  4:41 AM  
Result Value Ref Range  Bilirubin UA, confirm NEGATIVE  NEG

## 2020-02-15 NOTE — ED NOTES
0004: Patient arrives to ED with complaints of abdominal pain with vomiting that began yesterday evening. Patient accompanied by daughters who report pt PMH of MI. Patient placed on the monitor x3. Family at bedside 0130: Patient remains resting in bed. Monitor x3. SR x2. 
 
0230: Patient reports improvement in pain with medication. Family remains at bedside. 2191: Patient back from CT at this time. Family remains at bedside. 200: TRANSFER - OUT REPORT: 
 
Verbal report given to Kingsley davis RN (name) on Vernal Gall  being transferred to [facility]) for routine progression of care Report consisted of patients Situation, Background, Assessment and  
Recommendations(SBAR). Information from the following report(s) SBAR, Kardex, ED Summary, Intake/Output, MAR and Recent Results was reviewed with the receiving nurse. Lines:  
Peripheral IV 02/15/20 Right Antecubital (Active) Site Assessment Clean, dry, & intact 2/15/2020  1:08 AM  
Phlebitis Assessment 0 2/15/2020  1:08 AM  
Infiltration Assessment 0 2/15/2020  1:08 AM  
Dressing Status Clean, dry, & intact 2/15/2020  1:08 AM  
Dressing Type Transparent 2/15/2020  1:08 AM  
Hub Color/Line Status Flushed;Capped 2/15/2020  1:08 AM  
  
 
Opportunity for questions and clarification was provided. Patient transported with: 
 [employer]

## 2020-02-15 NOTE — PROGRESS NOTES
Hospitalist Progress Note NAME: Jacqui Sam :  1927 MRN:  [id number] Assessment / Plan: 
 
Elevated LFTs cause unknown Admit patient to telemetry  GI consultation  Check ammonia level, normal  
Check hepatitis profile  Check PT/INR 
  
HTN continue home antihypertensive medication 
  
Hyperlipidemiahold statin because of elevated LFT 
  
Elevated troponin  Monitor troponin 
-Echocardiogram pending  
  
History of coronary artery disease Continue aspirin 
  
CHF diastolic chronichold Bumex worsening renal function  
  
Acute on chronic renal failure -IV fluid  
-monitoring  
  
Code Status: FULL Surrogate Decision Maker:Alma Rosa Khalil 
  
DVT Prophylaxis: HEPARIN  
GI Prophylaxis: not indicated 
  
 
  
 
Subjective: Chief Complaint / Reason for Physician Visit \"abdominal pain \". Discussed with RN events overnight. Review of Systems: 
Symptom Y/N Comments  Symptom Y/N Comments Fever/Chills    Chest Pain Poor Appetite    Edema Cough    Abdominal Pain Sputum    Joint Pain SOB/DOTSON    Pruritis/Rash Nausea/vomit    Tolerating PT/OT Diarrhea    Tolerating Diet Constipation    Other Could NOT obtain due to:   
 
Objective: VITALS:  
Last 24hrs VS reviewed since prior progress note. Most recent are: 
Patient Vitals for the past 24 hrs: 
 Temp Pulse Resp BP SpO2  
02/15/20 1214 97.8 °F (36.6 °C) 73 18 119/45 100 % 02/15/20 0757 98 °F (36.7 °C) 72 20 123/73 100 % 02/15/20 0723 97.9 °F (36.6 °C) 67 25  98 % 02/15/20 0430  63 18 111/55 97 % 02/15/20 0330  73 16 105/56 98 % 20 2339 97.9 °F (36.6 °C) 88 18 105/52 99 % No intake or output data in the 24 hours ending 02/15/20 1245 PHYSICAL EXAM: 
General: WD, WN. Alert, cooperative, no acute distress   
EENT:  EOMI. Anicteric sclerae. MMM Resp:  CTA bilaterally, no wheezing or rales. No accessory muscle use CV:  Regular  rhythm,  No edema GI:  Soft, Non distended, Non tender.  +Bowel sounds Neurologic:  Alert and oriented X 3, normal speech, Psych:   Good insight. Not anxious nor agitated Skin:  No rashes. No jaundice Reviewed most current lab test results and cultures  YES Reviewed most current radiology test results   YES Review and summation of old records today    NO Reviewed patient's current orders and MAR    YES 
PMH/SH reviewed - no change compared to H&P 
________________________________________________________________________ Care Plan discussed with: 
  Comments Patient Family RN Care Manager Consultant Multidiciplinary team rounds were held today with , nursing, pharmacist and clinical coordinator. Patient's plan of care was discussed; medications were reviewed and discharge planning was addressed. ________________________________________________________________________ Total NON critical care TIME:  35 Minutes Total CRITICAL CARE TIME Spent:   Minutes non procedure based Comments >50% of visit spent in counseling and coordination of care    
________________________________________________________________________ Maday Mcnamara MD  
 
Procedures: see electronic medical records for all procedures/Xrays and details which were not copied into this note but were reviewed prior to creation of Plan. LABS: 
I reviewed today's most current labs and imaging studies. Pertinent labs include: 
Recent Labs  
  02/15/20 
0030 WBC 6.2 HGB 12.1 HCT 37.8  Recent Labs  
  02/15/20 
0030   
K 4.2  CO2 27 * BUN 41* CREA 1.55* CA 10.7* ALB 3.2* TBILI 1.0  
SGOT 1,941* * Signed: Maday Mcnamara MD

## 2020-02-16 PROBLEM — R10.9 ABDOMINAL PAIN: Status: ACTIVE | Noted: 2020-01-01

## 2020-02-16 NOTE — PROGRESS NOTES
Problem: Falls - Risk of 
Goal: *Absence of Falls Description Document Alphonso Francisco Fall Risk and appropriate interventions in the flowsheet. 2/16/2020 0327 by Carter Lindsey RN Outcome: Progressing Towards Goal 
Note: Fall Risk Interventions: 
Mobility Interventions: Communicate number of staff needed for ambulation/transfer, Patient to call before getting OOB, Utilize walker, cane, or other assistive device Medication Interventions: Evaluate medications/consider consulting pharmacy, Patient to call before getting OOB Elimination Interventions: Call light in reach, Patient to call for help with toileting needs 2/16/2020 0156 by Carter Lindsey RN Outcome: Progressing Towards Goal 
Note: Fall Risk Interventions: 
Mobility Interventions: Communicate number of staff needed for ambulation/transfer, Patient to call before getting OOB, Utilize walker, cane, or other assistive device Medication Interventions: Evaluate medications/consider consulting pharmacy, Patient to call before getting OOB Elimination Interventions: Call light in reach, Patient to call for help with toileting needs Problem: Patient Education: Go to Patient Education Activity Goal: Patient/Family Education 2/16/2020 0327 by Carter Lindsey RN Outcome: Progressing Towards Goal 
2/16/2020 0156 by Carter Lindsey RN Outcome: Progressing Towards Goal 
  
Problem: Pain Goal: *Control of Pain 2/16/2020 0327 by Carter Lindsey RN Outcome: Progressing Towards Goal 
2/16/2020 0156 by Carter Lindsey RN Outcome: Progressing Towards Goal 
  
Problem: Pain Goal: *Control of Pain 2/16/2020 0327 by Carter Lindsey RN Outcome: Progressing Towards Goal 
2/16/2020 0156 by Carter Lindsey RN Outcome: Progressing Towards Goal 
  
Problem: Nutrition Deficit Goal: *Optimize nutritional status Outcome: Progressing Towards Goal

## 2020-02-16 NOTE — PROGRESS NOTES
Problem: Falls - Risk of 
Goal: *Absence of Falls Description Document Dilip Escalera Fall Risk and appropriate interventions in the flowsheet. Outcome: Progressing Towards Goal 
Note: Fall Risk Interventions: 
Mobility Interventions: Communicate number of staff needed for ambulation/transfer, Patient to call before getting OOB, Utilize walker, cane, or other assistive device Medication Interventions: Evaluate medications/consider consulting pharmacy, Patient to call before getting OOB Elimination Interventions: Call light in reach, Patient to call for help with toileting needs Problem: Patient Education: Go to Patient Education Activity Goal: Patient/Family Education Outcome: Progressing Towards Goal 
  
Problem: Pain Goal: *Control of Pain Outcome: Progressing Towards Goal

## 2020-02-16 NOTE — PROGRESS NOTES
GI PROGRESS NOTE 
 
NAME:             Elias Zapata :              1927 MRN:              [id number] Admit Date:     2020 Todays Date:  2020 Subjective:  
 
    
Mild abdominal pain no nausea. Greatest complaint is regarding leg pain Patient taking acetaminophen 1350 mg nightly. Level on admission therapeutic  Not repeated. Review of Systems - Respiratory ROS: no cough, shortness of breath, or wheezing Cardiovascular ROS: no chest pain or dyspnea on exertion Medications-reviewed Current Facility-Administered Medications Medication Dose Route Frequency  traMADol (ULTRAM) tablet 50 mg  50 mg Oral BID PRN  
 0.9% sodium chloride infusion  50 mL/hr IntraVENous CONTINUOUS  
 ondansetron (ZOFRAN) injection 4 mg  4 mg IntraVENous Q30MIN PRN  
 sodium chloride (NS) flush 5-40 mL  5-40 mL IntraVENous Q8H  
 sodium chloride (NS) flush 5-40 mL  5-40 mL IntraVENous PRN  
 bisacodyL (DULCOLAX) tablet 5 mg  5 mg Oral DAILY PRN  
 heparin (porcine) injection 5,000 Units  5,000 Units SubCUTAneous Q8H  
 doxazosin (CARDURA) tablet 2 mg  2 mg Oral DAILY  metoprolol tartrate (LOPRESSOR) tablet 12.5 mg  12.5 mg Oral BID  magnesium oxide (MAG-OX) tablet 400 mg  400 mg Oral DAILY  latanoprost (XALATAN) 0.005 % ophthalmic solution 1 Drop  1 Drop Both Eyes QHS  fluticasone propionate (FLONASE) 50 mcg/actuation nasal spray 2 Spray  2 Spray Both Nostrils DAILY  pantoprazole (PROTONIX) tablet 40 mg  40 mg Oral ACB  aspirin delayed-release tablet 81 mg  81 mg Oral DAILY  ticagrelor (BRILINTA) tablet 60 mg  60 mg Oral Q12H  
 isosorbide mononitrate ER (IMDUR) tablet 30 mg  30 mg Oral DAILY  albuterol (PROVENTIL VENTOLIN) nebulizer solution 2.5 mg  2.5 mg Nebulization Q6H PRN Objective:  
 
Patient Vitals for the past 8 hrs: 
 BP Temp Pulse Resp SpO2  
20 0754 141/61 97 °F (36.1 °C) 63 16 94 % 02/16/20 0426 113/52 97.8 °F (36.6 °C) 96 18 100 % No intake/output data recorded. 02/14 1901 - 02/16 0700 In: 1707.5 [P.O.:220; I.V.:1487.5] Out: 540 [Urine:540] EXAM:   
Visit Vitals /61 (BP 1 Location: Left arm, BP Patient Position: At rest) Pulse 63 Temp 97 °F (36.1 °C) Resp 16 SpO2 94% Breastfeeding No  
 
GENERAL:  The patient is an elderly woman, who is oriented x3, in no obvious distress. She is morbidly obese. SKIN:  Warm and dry. No petechiae or ecchymoses. HEAD, EYES, EARS, NOSE, AND THROAT:  Sclerae are anicteric. Conjunctivae are pink. Moist mucous membranes. NECK:  Supple. There is no adenopathy of the neck or groin. LUNGS:  Chest is clear to auscultation and percussion. HEART:  Regular rate and rhythm. ABDOMEN:  Soft with several surgical scars. Some localized RUQ tenderness EXTREMITIES:  Reveals weakness in the right foot, moving the extremities. Data Review Recent Labs  
  02/16/20 
0349 02/15/20 
0030 WBC 7.9 6.2 HGB 10.1* 12.1 HCT 31.2* 37.8  214 Recent Labs  
  02/16/20 
0349 02/15/20 
0030  138  
K 3.9 4.2 * 104 CO2 23 27 BUN 22* 41* CREA 0.85 1.55* GLU 96 241* CA 9.8 10.7* Recent Labs  
  02/16/20 
0349 02/15/20 
0030 SGOT 1,553* 1,941* * 406* TP 6.0* 7.5 ALB 2.5* 3.2*  
GLOB 3.5 4.3*  
LPSE  --  127 Assessment:  
1. Marked acute onset abdominal pain and associated abnormal liver function test, certainly it sounds like an ischemic event. No imaging evidence for biliary tract disease  No evidence on CT scan to suggest bowel ischemia. Lactic acid now better as is renal function. Suspect possible acute cardiac event and await full cardiac evaluation. Troponin still elevated. Katherene Means ALT and Bili worse and suggest acute hepatic injury. Do not think chronic use of Tylenol high enough to cause toxicity.   We will review get a complete abdomenal ultrasound and review CT scan to make sure there is no evidence of acute hepatic vein or portal vein thrombosis that could explain this acute elevation in liver function test.  More likely, it represents transient hepatic ischemia secondary to a cardiac event, less likely mesenteric event. We will follow this closely with you. 2.  Coronary artery disease. 3.  Diastolic heart failure. 4.  Chronic kidney disease. Active Problems: 
  Elevated LFTs (2/15/2020) Plan: 1. Abdominal US stat. Continued close observation.   
  
 
 
Jose M Milligan MD

## 2020-02-16 NOTE — PHYSICIAN ADVISORY
Letter of Status Determination:  
Recommend hospitalization status maintain as 
INPATIENT  status Pt Name:  Arnol Gann MR#  
TERESA # [id number] / 
[id number] Payor: Otoniel Shine / Plan: Marisol Fay / Product Type: Medicare /   
[facility]#  [id number] Room and Hospital  2135/01  @ [facility] Hospitalization date  2/14/2020 11:41 PM  
Current Attending Physician  Thu Tomlinson MD  
Principal diagnosis  Elevated LFTs [R94.5] Clinicals  80 y.o. y.o  female hospitalized with above diagnosis ALT has nearly doubled over last 24 hours. Per chart documentation, patient's family states that when she has angina or acute coronary events, she tends to respond with these GI symptoms. Chronically minimally elevated troponin elevation, echo normal. Per cardiology, plan interrogate pacer to be complete. Per GI, marked acute onset abdominal pain and associated abnormal liver function test, sounds like an ischemic event. More likely, it represents transient hepatic ischemia secondary to a cardiac event, less likely mesenteric event. Milliman (MCG) criteria Does  apply Severe hepatitis (eg, AST or ALT level 15 times upper limit of normal or higher). STATUS DETERMINATION  inpatient Additional comments Payor: Otoniel Shine / Plan: [place] MEDICARE PART A & B / Product Type: Medicare /   
  
 
 
Irma Griggs M.D. Physician Advisor [address], [address] [place] [address] C: [phone] Walter Galvan. [email or web address]

## 2020-02-16 NOTE — CONSULTS
CARDIOLOGY CONSULT Date of  Admission: 2/14/2020 11:41 PM  
 
Admission type:Emergency Consult for:  Troponin elevation, evaluate for cardiac event Consulted by: Dr. Ron Zuniga Subjective:  
 
Britt Ayala is a 60-year-old female with past medical history of diabetes, CAD, hypertension presenting with abdominal discomfort. The patient reports that she started having abdominal pain Friday night. Had a small normal bowel movement PTN. She states that this is all over her abdomen. She has had multiple episodes of nonbloody nonbilious emesis. She also endorses nausea. No previous abdominal surgeries. No fevers. No change in her bowel habits. She denies dysuria. She is unsure whether or not she is ever had abdominal surgery. The patient denies chest pain/ shortness of breath, orthopnea, PND, LE edema, palpitations, syncope, or presyncope. CT abdomen was done and it was negative for any acute abnormality, blood work was done was significant for elevated LFT. Troponin was minimally elevated and flat. Echo was essentially normal as noted below. Patient Active Problem List  
 Diagnosis Date Noted  Abdominal pain 02/16/2020  Elevated LFTs 02/15/2020  Severe obesity (BMI 35.0-39.9) 06/14/2018  Chronic diastolic heart failure ([address] 08/14/2016  Acute blood loss anemia 08/13/2016  GI bleed 08/13/2016  Elevated troponin 08/13/2016  CKD (chronic kidney disease) stage 3, GFR 30-59 ml/min ([facility]) 08/13/2016  Coronary artery disease involving native coronary artery without angina pectoris 11/12/2015  S/P PTCA (percutaneous transluminal coronary angioplasty) 04/09/2015  Pacemaker 03/09/2015  Mobitz type II atrioventricular block 03/07/2015  Syncope and collapse 03/06/2015  CAD (coronary artery disease), native coronary artery 01/03/2015  Chest pain 08/14/2014  
 NSTEMI (non-ST elevated myocardial infarction) ([address] 04/07/2013  Other nonspecific abnormal serum enzyme levels 04/06/2013  Sick sinus syndrome ([address] 04/06/2013  Dehydration 04/06/2013  ACS (acute coronary syndrome) [facility] 04/06/2013  Hypertension 04/06/2013 Regla Carroll NP Past Medical History:  
Diagnosis Date  Asthma  CAD (coronary artery disease)  Diabetes ([facility]  Hypertension  Irregular heart rate 4/25/13  Morbid obesity ([facility]  Other ill-defined conditions(799.89)   
 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  
 Drug use: No  
 Sexual activity: Yes  
  Partners: Male Allergies Allergen Reactions  Pcn [Penicillins] Unknown (comments) History reviewed. No pertinent family history. Current Facility-Administered Medications Medication Dose Route Frequency  traMADol (ULTRAM) tablet 50 mg  50 mg Oral BID PRN  
 0.9% sodium chloride infusion  50 mL/hr IntraVENous CONTINUOUS  
 ondansetron (ZOFRAN) injection 4 mg  4 mg IntraVENous Q30MIN PRN  
 sodium chloride (NS) flush 5-40 mL  5-40 mL IntraVENous Q8H  
 sodium chloride (NS) flush 5-40 mL  5-40 mL IntraVENous PRN  
 bisacodyL (DULCOLAX) tablet 5 mg  5 mg Oral DAILY PRN  
 heparin (porcine) injection 5,000 Units  5,000 Units SubCUTAneous Q8H  
 doxazosin (CARDURA) tablet 2 mg  2 mg Oral DAILY  metoprolol tartrate (LOPRESSOR) tablet 12.5 mg  12.5 mg Oral BID  magnesium oxide (MAG-OX) tablet 400 mg  400 mg Oral DAILY  latanoprost (XALATAN) 0.005 % ophthalmic solution 1 Drop  1 Drop Both Eyes QHS  fluticasone propionate (FLONASE) 50 mcg/actuation nasal spray 2 Spray  2 Spray Both Nostrils DAILY  pantoprazole (PROTONIX) tablet 40 mg  40 mg Oral ACB  aspirin delayed-release tablet 81 mg  81 mg Oral DAILY  ticagrelor (BRILINTA) tablet 60 mg  60 mg Oral Q12H  
 isosorbide mononitrate ER (IMDUR) tablet 30 mg  30 mg Oral DAILY  albuterol (PROVENTIL VENTOLIN) nebulizer solution 2.5 mg  2.5 mg Nebulization Q6H PRN Review of Symptoms: 
11 systems reviewed, negative other than as stated in HPI Subjective:  
 
  
Visit Vitals /62 (BP 1 Location: Left arm, BP Patient Position: At rest) Pulse 60 Temp 97.8 °F (36.6 °C) Resp 16 SpO2 96% Breastfeeding No  
 
 
Physical: 
Gen:  NAD Heart: regular rhythm, no murmur, gallop or rub Lungs: clear to auscultation bilaterally Neck: supple, symmetrical, trachea midline, no adenopathy, thyroid: not enlarged, symmetric, no tenderness/mass/nodules, no carotid bruit and no JVD Abdomen: soft, with surgical scares Extremities: positive femorals without bruits, normal dp pulses Neurologic: CN, motor and speech grossly normal 
 
Data Review:  
Recent Labs  
  02/16/20 
0349 02/15/20 
0030 WBC 7.9 6.2 HGB 10.1* 12.1 HCT 31.2* 37.8  214 Recent Labs  
  02/16/20 
0349 02/15/20 
1206 02/15/20 
0030   --  138  
K 3.9  --  4.2 *  --  104 CO2 23  --  27  
GLU 96  --  241* BUN 22*  --  41* CREA 0.85  --  1.55* CA 9.8  --  10.7* ALB 2.5*  --  3.2* TBILI 2.3*  --  1.0 SGOT 1,553*  --  1,941* ALT 1,520*  --  771* INR  --  1.1  --   
 
 
Recent Labs  
  02/16/20 
0349 02/15/20 
1329 02/15/20 
0712 02/15/20 
0409 02/15/20 
0030 TROIQ 0.18* 0.16* 0.12* 0.12* 0.12* Lab Results Component Value Date/Time  Cholesterol, total 128 03/28/2017 10:46 AM  
 HDL Cholesterol 43 03/28/2017 10:46 AM  
 LDL, calculated 70 03/28/2017 10:46 AM  
 VLDL, calculated 15 03/28/2017 10:46 AM  
 Triglyceride 76 03/28/2017 10:46 AM  
 CHOL/HDL Ratio 2.5 01/05/2015 02:25 AM  
 
 
 
 
Intake/Output Summary (Last 24 hours) at 2/16/2020 1631 Last data filed at 2/16/2020 1309 Gross per 24 hour Intake 2205 ml Output 290 ml Net 1915 ml  
  
 
Cardiographics Telemetry: normal sinus rhythm with intermittent pacing ECG: 
NSR with intermittent pacing Echocardiogram: · Normal cavity size and systolic function (ejection fraction normal). Mild concentric hypertrophy. Estimated left ventricular ejection fraction is 55 - 60%. Age-appropriate left ventricular diastolic function. · Mitral valve non-specific thickening. Trace mitral valve regurgitation is present. · Mild to moderate tricuspid valve regurgitation is present. · Moderate pulmonary hypertension. Assessment:  
  
 Principal Problem: 
  Abdominal pain (2/16/2020) Active Problems: 
  Sick sinus syndrome ([address] (4/6/2013) Hypertension (4/6/2013) Mobitz type II atrioventricular block (3/7/2015) Pacemaker (3/9/2015) S/P PTCA (percutaneous transluminal coronary angioplasty) (4/9/2015) Coronary artery disease involving native coronary artery without angina pectoris (11/12/2015) Elevated LFTs (2/15/2020) Plan: Abdominal pain with nausea and vomiting, elevated LFTs, with apparently fairly normal abdominal ultrasound and abdominal CT. GI following. Chronically minimally elevated troponin elevation without chest pain or complications, echo normal.   No evidence of acute coronary syndrome. Last catheterization in 2016 showed widely patent stents in the setting of a higher troponin. On indefinite DAPT due to prior NSTEMI's when Brilinta interrupted. Interrogate pacer to be complete. Low suspicion for cardiac cause of events. Certainly there is no evidence of LV or RV dysfunction that would cause LFT elevation and there is no evidence of myocardial infarction. Thanks for the consult. We appreciate the opportunity to participate in this patient's care and will follow with you.

## 2020-02-16 NOTE — PROGRESS NOTES
Reason for Admission:   Elevated LFT's  
            
RUR Score:     27 Resources/supports as identified by patient/family:   Family support. Top Challenges facing patient (as identified by patient/family and CM): Finances/Medication cost?   Patient has dual coverage w/Medicare (A & B) and Blue Cross. Voiced no financial concern regarding affording medications. [facility] ([address]). Transportation? Pt doesn't drive. Daughter and family transports to medical appointments'. Support system or lack thereof? Family and friends support system in place. Living arrangements? Lives w/daughter in ranch style home w/ramp to enter the front door. Self-care/ADLs/Cognition? Patient is alert and oriented x4. Requires assistance w/ADL's & IADL's. Current Advanced Directive/Advance Care Plan: FULL code. Verbalized daughter is her primary decision maker. Plan for utilizing home health:    Prior [facility] in the past.  Not currently open w/HH at this present time. DME (rollator/cane/ramp/bedside commode). Transition of Care Plan:  Lives w/daughter in ranch style home w/a ramp to enter the front door. Last seen in PCP office x 1 mo ago. [address] [place] [address] ([address] No O2. DME (cane, rollator, ramp, and bedside commode). Offered HH at time of discharge daughter declined until they have a better understanding of what's medically going on w/patient. CM to follow back up re: d/c disposition. Care Management Interventions PCP Verified by CM: Yes(x 1 month) Mode of Transport at Discharge: Other (see comment)(Family) Transition of Care Consult (CM Consult): Discharge Planning Discharge Durable Medical Equipment: (DME (rollator/bedside commode)) Current Support Network: Relative's Home(Lives w/daughter in ranch style home) Confirm Follow Up Transport: Family Discharge Location Discharge Placement: (Home) SW to continue to follow for discharge plan. Crispin Freeman, MSW 
0823

## 2020-02-16 NOTE — PROGRESS NOTES
Problem: Falls - Risk of 
Goal: *Absence of Falls Description Document Guillermina Bradshaw Fall Risk and appropriate interventions in the flowsheet. 2/16/2020 0327 by Yeni Kay RN Outcome: Progressing Towards Goal 
Note: Fall Risk Interventions: 
Mobility Interventions: Communicate number of staff needed for ambulation/transfer, Patient to call before getting OOB, Utilize walker, cane, or other assistive device Medication Interventions: Evaluate medications/consider consulting pharmacy, Patient to call before getting OOB Elimination Interventions: Call light in reach, Patient to call for help with toileting needs 2/16/2020 0327 by Yeni Kay RN Outcome: Progressing Towards Goal 
Note: Fall Risk Interventions: 
Mobility Interventions: Communicate number of staff needed for ambulation/transfer, Patient to call before getting OOB, Utilize walker, cane, or other assistive device Medication Interventions: Evaluate medications/consider consulting pharmacy, Patient to call before getting OOB Elimination Interventions: Call light in reach, Patient to call for help with toileting needs 2/16/2020 0156 by Yeni Kay RN Outcome: Progressing Towards Goal 
Note: Fall Risk Interventions: 
Mobility Interventions: Communicate number of staff needed for ambulation/transfer, Patient to call before getting OOB, Utilize walker, cane, or other assistive device Medication Interventions: Evaluate medications/consider consulting pharmacy, Patient to call before getting OOB Elimination Interventions: Call light in reach, Patient to call for help with toileting needs Problem: Patient Education: Go to Patient Education Activity Goal: Patient/Family Education 2/16/2020 0327 by Yeni Kay RN Outcome: Progressing Towards Goal 
2/16/2020 0327 by Yeni Kay RN Outcome: Progressing Towards Goal 
2/16/2020 0156 by Yeni Kay RN 
 Outcome: Progressing Towards Goal 
  
Problem: Pain Goal: *Control of Pain 2/16/2020 0327 by Sri Khan RN Outcome: Progressing Towards Goal 
2/16/2020 0327 by Sri Khan RN Outcome: Progressing Towards Goal 
2/16/2020 0156 by Sri Khan RN Outcome: Progressing Towards Goal 
  
Problem: Nutrition Deficit Goal: *Optimize nutritional status 2/16/2020 0327 by Sri Khan RN Outcome: Progressing Towards Goal 
2/16/2020 0327 by Sri Khan RN Outcome: Progressing Towards Goal

## 2020-02-16 NOTE — PROGRESS NOTES
.General Surgery End of Shift Nursing Note Bedside shift change report given to Nydia De La O RN (oncoming nurse) by Nikole Blount (offgoing nurse). Report included the following information SBAR, ED Summary, Procedure Summary, Intake/Output, MAR and Recent Results. Shift worked:   [id number] Summary of shift:   Patient has worsening liver function. Had to get an arterial stick for labs. Her urine output is minimal. She is hooked up to a purewick. She complains of a lot of foot pain. Called hospitalist and obtained an order for Tramadol BID. +3 edema to R foot with +2 to RLE. +2 edema on L foot and +1 to LLE. GI ordered a STAT CT of abdomen. Issues for physician to address:  Would like cardiac dr input on case. Need ECHO reviewed. Possible doppler to BLLE. Number times ambulated in hallway past shift: 0 Number of times OOB to chair past shift: 0 Pain Management: 
Current medication: Tramadol Patient states pain is manageable on current pain medication: no 
 
GI: 
 
Current diet:  DIET NPO Tolerating current diet:yes Passing flatus:yes Last Bowel Movement: 2/15/20 Respiratory: 
 
Incentive Spirometer at bedside: no 
Patient instructed on use: no 
 
Patient Safety: 
 
Falls Score: 3 Bed Alarm On? no 
Sitter?  no 
 
Lurlene Romberg, RN

## 2020-02-16 NOTE — ROUTINE PROCESS
Bedside and Verbal shift change report given to Hui (oncoming nurse) by Raissa Ortiz (offgoing nurse). Report included the following information SBAR, Kardex, Procedure Summary, Intake/Output, MAR and Cardiac Rhythm paced. Pt had only minor complained of foot pain when moved. Pure wick used for urine output. Meals tolerated with no reports of nausea or vomiting. US of the abdomen completed to rule out hepatic vein or portal vein obstruction; negative. Family at the bedside.

## 2020-02-16 NOTE — CONSULTS
[address] [facility] Name:  Leigh Goodrich 
MR#:  [id number] :  [id number] ACCOUNT #:  [de-identified] DATE OF SERVICE:  02/15/2020 CHIEF COMPLAINT:  Abdominal pain, abnormal liver function test. 
 
HISTORY OF PRESENT ILLNESS:  A 24-year-old woman with a history of sick sinus syndrome, hypertension, coronary artery disease, CKD, chronic diastolic heart failure, prior history of GI bleed three years ago, thought to be related to diverticular disease. She presented yesterday evening with very acute onset of upper abdominal pain, nausea, and nonbloody vomiting associated with diaphoresis as observed by her daughter. This was 10/10 in severity noted diffusely. The patient's family states that when she has angina or acute coronary events, she tends to respond that way with these GI symptoms. She is now in the hospital almost 24 hours. She has been hydrated and notes marked improvement in her abdominal pain. On admission to the hospital, she was found to have elevated lactic acid, rising troponin level, normal bilirubin of 1.0, ALT of 771, AST of 1941 and alkaline phosphatase of 406 with a normal lipase. Serum ammonia level was normal.  The patient has had no further bowel movements. PAST MEDICAL HISTORY:  Positive for asthma, coronary artery disease, diastolic heart disease, diastolic heart failure, diabetes, hypertension, morbid obesity, lower GI bleed suspected to be diverticular, chronic leg pain and footdrop. PAST SURGICAL HISTORY:  Colonoscopy, cholecystectomy, coronary stents, catheterization, pacemaker, status post ROBYN and BSO. FAMILY HISTORY:  Noncontributory. SOCIAL HISTORY:  Nonsmoker and nondrinker. ALLERGIES:  PENICILLIN. MEDICATIONS:  Prior to admission include metoprolol, Imdur, Brilinta, BuSpar, MiraLax, morphine sulfate intrathecal root for foot pain, Zyrtec, Cardura, Neurontin, Bumex, magnesium oxide, Protonix, Ultram, ferrous sulphate, Tylenol, allopurinol, aspirin, albuterol, Pravachol, potassium, Lomotil, nitroglycerin, nystatin, Kenalog, Flonase. REVIEW OF SYSTEMS:  As noted above and her biggest complaint at this point is foot pain and her longstanding history of footdrop. PHYSICAL EXAMINATION: 
GENERAL:  The patient is an elderly woman, who is oriented x3, in no obvious distress. She is morbidly obese. VITAL SIGNS:  Temperature is 98.4, pulse is 86, blood pressure is 121/75, respirations 16. SKIN:  Warm and dry. No petechiae or ecchymoses. HEAD, EYES, EARS, NOSE, AND THROAT:  Sclerae are anicteric. Conjunctivae are pink. Moist mucous membranes. NECK:  Supple. There is no adenopathy of the neck or groin. LUNGS:  Chest is clear to auscultation and percussion. HEART:  Regular rate and rhythm. ABDOMEN:  Soft with several surgical scars. EXTREMITIES:  Reveals weakness in the right foot, moving the extremities. LABORATORY DATA:  WBC is 6.2, hemoglobin is 12.1, MCV is 100. Differential is basically normal.  Platelet count is 881,329. Urinalysis is normal.  PT/INR is 1.1, normal.  Liver function test as note above. BUN is 41, creatinine is 1.55. Calcium is slightly elevated at 10.7 with an albumin of 3.2. Lactic acid is 3.0, troponin level now 0.16. Hepatitis panel for hepatitis A, B, and C is all negative. MO is pending. CT scan without contrast demonstrated normal liver. Gallbladder surgically absent. Normal spleen. Normal pancreas. Normal kidneys and ureters. Normal stomach. Normal small bowel. There was no dilation or wall thickening. Normal colon. There is sigmoid diverticulosis. Appendix seems to be present. No ascites or pneumoperitoneum. Uterus is surgically absent. The chest x-ray has no acute process. Ultrasound of the abdomen demonstrates normal right upper quadrant ultrasound. Echocardiogram performed today is reported to be abnormal.  I do not have any more results from that. IMPRESSION AND PLAN: 
1.  Marked acute onset abdominal pain and associated abnormal liver function test, certainly it sounds like an ischemic event. No imaging evidence for biliary tract disease  No evidence on CT scan to suggest bowel ischemia. Lactic acid is increased. Suspect possible acute cardiac event and await full cardiac evaluation. The elevated liver function test suspect are ischemic. We will review the ultrasound result and CT scan to make sure there is no evidence of acute hepatic vein or portal vein thrombosis that could explain this acute elevation in liver function test.  More likely, it represents transient hepatic ischemia secondary to a cardiac event, less likely mesenteric event. We will follow this closely with you. 2.  Coronary artery disease. 3.  Diastolic heart failure. 4.  Chronic kidney disease. PLAN:  Close observation, hydration, support and await final results of the echo and cardiac evaluation. MD ARIANNE Lucero/[email or web address] 
D:  02/15/2020 21:44 
T:  02/16/2020 2:32 JOB #:  [id number]

## 2020-02-16 NOTE — PROGRESS NOTES
Received a call from telemetry that patient had a quick change from paced to Aflutter and back to paced. Patient is asymptomatic. Patient said she was trying to sit up to get something off of bedside table. Dr. Josephine Silvestre of cardiology has been consulted. Repeat labs in process. Echo and EKG are negative. Family at the bedside.  
 
Thompson Wade RN

## 2020-02-16 NOTE — ROUTINE PROCESS
Bedside and Verbal shift change report given to Dondra Harada (oncoming nurse) by Nima Ding (offgoing nurse). Report included the following information SBAR, Kardex, ED Summary, Procedure Summary, Intake/Output, MAR, Recent Results and Cardiac Rhythm paced. Pt only complained of leg pain from siting in the bed all day. Pt was turned appropriately and requested to sit in the recliner. Family at the bedside. Pt remained NPO with exception of meds. Pt up with assist to the [facility]. See progress notes for pertinent events of the shift. Cardiologist aware of Troponin 0.16. Attending Dr. Mariano Godinez aware of Lactic acid of 3.0 and other labs. Echo completed; see results.

## 2020-02-16 NOTE — PROGRESS NOTES
Notified hospitalist to see if we could get an order for an arterial line stick r/t 3 nurses being unsuccessful in obtaining labs. On day shift, the PICC team was called to obtain labs. The PICC team is not here on Sunday. 5 - obtained an order for an arterial stick for labs. 2977 - lab stated that blue top=aptt+inr needed to be redrawn.

## 2020-02-16 NOTE — PROGRESS NOTES
Hospitalist Progress Note NAME: Arleth Decker :  1927 MRN:  [id number] Assessment / Plan: 
 
Elevated LFTs cause unknown Admit patient to telemetry  GI consultation  Check ammonia level, normal  
Check hepatitis profile  Check PT/INR 
  
HTN continue home antihypertensive medication 
  
Hyperlipidemiahold statin because of elevated LFT 
  
Elevated troponin  Monitor troponin 
-Echocardiogram Reviewed   
  
History of coronary artery disease Continue aspirin 
  
CHF diastolic chronichold Bumex worsening renal function  
  
Acute on chronic renal failure -IV fluid  
-monitoring  
  
Code Status: FULL Surrogate Decision Maker:Alma Rosa Khalil 
  
DVT Prophylaxis: HEPARIN  
GI Prophylaxis: not indicated 
  
 
  
 
Subjective: Chief Complaint / Reason for Physician Visit \"slight improvement in abdominal pain  \". Discussed with RN events overnight. Review of Systems: 
Symptom Y/N Comments  Symptom Y/N Comments Fever/Chills    Chest Pain Poor Appetite    Edema Cough    Abdominal Pain Sputum    Joint Pain SOB/DOTSON    Pruritis/Rash Nausea/vomit    Tolerating PT/OT Diarrhea    Tolerating Diet Constipation    Other Could NOT obtain due to:   
 
Objective: VITALS:  
Last 24hrs VS reviewed since prior progress note. Most recent are: 
Patient Vitals for the past 24 hrs: 
 Temp Pulse Resp BP SpO2  
20 1204 97.9 °F (36.6 °C) 60  100/58 96 % 20 0754 97 °F (36.1 °C) 63 16 141/61 94 % 20 0426 97.8 °F (36.6 °C) 96 18 113/52 100 % 02/15/20 2310 98.1 °F (36.7 °C) (!) 59 19 127/57 97 % 02/15/20 1825 98.4 °F (36.9 °C) 86 16 121/75 95 % 02/15/20 1531 97.9 °F (36.6 °C) 77 16 107/46 95 % 02/15/20 1424 98.4 °F (36.9 °C) 77 18 112/62 94 % Intake/Output Summary (Last 24 hours) at 2020 1325 Last data filed at 2020 1309 Gross per 24 hour Intake 2205 ml Output 490 ml Net 1715 ml  
  
 
 PHYSICAL EXAM: 
General: WD, WN. Alert, cooperative, no acute distress   
EENT:  EOMI. Anicteric sclerae. MMM Resp:  CTA bilaterally, no wheezing or rales. No accessory muscle use CV:  Regular  rhythm,  No edema GI:  Soft, Non distended, Non tender.  +Bowel sounds Neurologic:  Alert and oriented X 3, normal speech, Psych:   Good insight. Not anxious nor agitated Skin:  No rashes. No jaundice Reviewed most current lab test results and cultures  YES Reviewed most current radiology test results   YES Review and summation of old records today    NO Reviewed patient's current orders and MAR    YES 
PMH/SH reviewed - no change compared to H&P 
________________________________________________________________________ Care Plan discussed with: 
  Comments Patient Family RN Care Manager Consultant Multidiciplinary team rounds were held today with , nursing, pharmacist and clinical coordinator. Patient's plan of care was discussed; medications were reviewed and discharge planning was addressed. ________________________________________________________________________ Total NON critical care TIME:  35 Minutes Total CRITICAL CARE TIME Spent:   Minutes non procedure based Comments >50% of visit spent in counseling and coordination of care    
________________________________________________________________________ Cristina Bennett MD  
 
Procedures: see electronic medical records for all procedures/Xrays and details which were not copied into this note but were reviewed prior to creation of Plan. LABS: 
I reviewed today's most current labs and imaging studies. Pertinent labs include: 
Recent Labs  
  02/16/20 
0349 02/15/20 
0030 WBC 7.9 6.2 HGB 10.1* 12.1 HCT 31.2* 37.8  214 Recent Labs  
  02/16/20 
0349 02/15/20 
1206 02/15/20 
0030   --  138  
K 3.9  --  4.2 *  --  104 CO2 23  --  27  
 GLU 96  --  241* BUN 22*  --  41* CREA 0.85  --  1.55* CA 9.8  --  10.7* ALB 2.5*  --  3.2* TBILI 2.3*  --  1.0 SGOT 1,553*  --  1,941* ALT 1,520*  --  771* INR  --  1.1  --   
 
 
Signed: Mercedes Michaud MD

## 2020-02-17 NOTE — PROGRESS NOTES
...     
 
F/U for elevated liver tests Abdominal pain, nausea and vomiting. S: Ms. Camille Fischer was seen by me today during rounds. At this time, she is resting + comfortably. She c/o abdominal pain when nursing aides were doing bp, but otherwise none since admission and no nausea and no vomiting. I reveiwed the h/p on the chart and Dr Mehreen Graham consult  She had significant abdominal pain and nausea and vomiting on admission. The patient's sister is with her today but she does not add much to the history, deferring to \"the other sister\"   Both hpi and ros are limited by this and the patient's memory disturbance. The patient has no other new complaints today. Please see admission consult for details of ROS; there are no changes today. O: Blood pressure 158/77, pulse 79, temperature 98 °F (36.7 °C), resp. rate 18, SpO2 96 %, not currently breastfeeding. Knows her name and that she is in hospital.  Cannot tell me year or President. Gen: Patient is in no acute distress. There is no jaundice. Lungs: Clear to auscultation bilaterally . Heart:+RRR. Abd: Soft, non tender, non-distended, bowel sounds present. Extremities: Warm. Recent Labs  
  02/17/20 0346 02/16/20 0349 WBC 5.6 7.9 HGB 11.9 10.1* HCT 38.3 31.2*  
 201 Recent Labs  
  02/17/20 
0346 02/16/20 
0349 02/15/20 
0030  143 138  
K 3.8 3.9 4.2 * 114* 104 CO2 21 23 27 BUN 15 22* 41* CREA 0.79 0.85 1.55* GLU 74 96 241* CA 10.4* 9.8 10.7* Recent Labs  
  02/17/20 
0346 02/16/20 
0349 02/15/20 
0030 SGOT 788* 1,553* 1,941* ALT 1,111* 1,520* 771* * 364* 406* TBILI 2.4* 2.3* 1.0 TP 6.9 6.0* 7.5 ALB 2.7* 2.5* 3.2*  
GLOB 4.2* 3.5 4.3*  
LPSE  --   --  127 Recent Labs  
  02/15/20 
1206 INR 1.1 PTP 10.8 No results for input(s): FE, TIBC, PSAT, FERR in the last 72 hours.   
No results found for: FOL, RBCF  
 No results for input(s): PH, PCO2, PO2 in the last 72 hours. Recent Labs  
  02/16/20 
0349 02/15/20 
1329 02/15/20 
9678 TROIQ 0.18* 0.16* 0.12* Lab Results Component Value Date/Time Cholesterol, total 128 03/28/2017 10:46 AM  
 HDL Cholesterol 43 03/28/2017 10:46 AM  
 LDL, calculated 70 03/28/2017 10:46 AM  
 Triglyceride 76 03/28/2017 10:46 AM  
 CHOL/HDL Ratio 2.5 01/05/2015 02:25 AM  
 
Lab Results Component Value Date/Time Glucose (POC) 103 (H) 02/16/2020 06:57 AM  
 Glucose (POC) 134 (H) 08/18/2016 11:21 AM  
 Glucose (POC) 99 08/18/2016 08:13 AM  
 Glucose (POC) 133 (H) 08/17/2016 09:39 PM  
 Glucose (POC) 119 (H) 08/17/2016 04:25 PM  
 
Lab Results Component Value Date/Time Color YELLOW/STRAW 02/15/2020 04:41 AM  
 Appearance CLEAR 02/15/2020 04:41 AM  
 Specific gravity 1.020 02/15/2020 04:41 AM  
 Specific gravity >1.030 (H) 11/13/2018 09:38 PM  
 pH (UA) 6.0 02/15/2020 04:41 AM  
 Protein 30 (A) 02/15/2020 04:41 AM  
 Glucose NEGATIVE  02/15/2020 04:41 AM  
 Ketone NEGATIVE  02/15/2020 04:41 AM  
 Bilirubin NEGATIVE  03/02/2019 02:57 PM  
 Urobilinogen 2.0 (H) 02/15/2020 04:41 AM  
 Nitrites NEGATIVE  02/15/2020 04:41 AM  
 Leukocyte Esterase NEGATIVE  02/15/2020 04:41 AM  
 Epithelial cells FEW 02/15/2020 04:41 AM  
 Bacteria NEGATIVE  02/15/2020 04:41 AM  
 WBC 0-4 02/15/2020 04:41 AM  
 RBC 0-5 02/15/2020 04:41 AM  
  
Cross sectional imaging:  IMPRESSION IMPRESSION:  
Patent and appropriately directed hepatic vessels. A: Principal Problem: 
  Abdominal pain (2/16/2020) Active Problems: 
  Sick sinus syndrome ([address] (4/6/2013) Hypertension (4/6/2013) Mobitz type II atrioventricular block (3/7/2015) Pacemaker (3/9/2015) S/P PTCA (percutaneous transluminal coronary angioplasty) (4/9/2015) Coronary artery disease involving native coronary artery without angina pectoris (11/12/2015) Elevated LFTs (2/15/2020) Comment: I reviewed Dr Ashley Scherer note The ddx is Right heart failure--this is not lilkley  (See Dr. Ramirez Ocean note and there was no pain). Also INR Not elevated as would be expected in Zone 3 necrosis Passing a stone likely dx Toxic Other cause of hepatitis   Both of these less likely P:  More hepatitis serology I will attempt to get mrcp (depends on her pacemaker compatibility) Then the next step will be eus  versus ercp. I asked Dr. Martha Osorio to review and advise me Angel Nation MD 
6:03 PM 
2/17/2020

## 2020-02-17 NOTE — ROUTINE PROCESS
-Please complete MRI History and Safety Screening Form for this patient using KARDEX only under Orders Requiring a Screening Form: 
 

## 2020-02-17 NOTE — PROGRESS NOTES
General Surgery End of Shift Nursing Note Bedside shift change report given to Lj Still RN (oncoming nurse) by Danny Jean RN (offgoing nurse). Report included the following information SBAR, Kardex, Procedure Summary, Intake/Output, MAR and Recent Results. Shift worked:   7p-7a Summary of shift:    Patient rested in bed most of the night as well as in the chair. Continues to complain about pain in her lower extremities. Issues for physician to address:   None at this time. Number times ambulated in hallway past shift: 0 Number of times OOB to chair past shift: 1 GI: 
 
Current diet:  DIET CLEAR LIQUID Tolerating current diet: YES Patient Safety: 
 
Bed Alarm On? No 
Sitter? No 
 
Robert Socks

## 2020-02-17 NOTE — PROGRESS NOTES
Hospitalist Progress Note NAME: Mejia Coppola :  1927 MRN:  [id number] Assessment / Plan: 
 
Elevated LFTs cause unknown Admit patient to telemetry  GI consultation  Check ammonia level, normal  
Check hepatitis profile negative  Check PT/INR 
 liver enzyme slowly coming down HTN continue home antihypertensive medication 
  
Hyperlipidemiahold statin because of elevated LFT 
  
Elevated troponin  Monitor troponin 
-Echocardiogram Reviewed   
  
History of coronary artery disease Continue aspirin 
  
CHF diastolic chronichold Bumex worsening renal function  
  
Acute on chronic renal failure -IV fluid  
-monitoring  
  
Code Status: FULL Surrogate Decision Maker:Alma Rosa Khalil 
  
DVT Prophylaxis: HEPARIN  
GI Prophylaxis: not indicated 
  
 
  
 
Subjective: Chief Complaint / Reason for Physician Visit \"feelingok   \". Discussed with RN events overnight. Review of Systems: 
Symptom Y/N Comments  Symptom Y/N Comments Fever/Chills    Chest Pain Poor Appetite    Edema Cough    Abdominal Pain Sputum    Joint Pain SOB/DOTSON    Pruritis/Rash Nausea/vomit    Tolerating PT/OT Diarrhea    Tolerating Diet Constipation    Other Could NOT obtain due to:   
 
Objective: VITALS:  
Last 24hrs VS reviewed since prior progress note. Most recent are: 
Patient Vitals for the past 24 hrs: 
 Temp Pulse Resp BP SpO2  
20 1055 98.2 °F (36.8 °C) 60 18 134/60 96 % 20 0745 98 °F (36.7 °C) 79 18 158/77 96 % 20 0336 97.6 °F (36.4 °C) 64 17 144/77 100 % 20 2325 97.5 °F (36.4 °C) 60 16 141/49 100 % 20 2316 98 °F (36.7 °C) 60 16 134/61 97 % 20 2000  61     
20 1955 98.2 °F (36.8 °C) 62 12 117/60 98 % 20 1803  70  107/47 96 % 20 1441 97.8 °F (36.6 °C) 60 18 101/62 96 % 20 1204 97.9 °F (36.6 °C) 60 18 100/58 96 % Intake/Output Summary (Last 24 hours) at 2/17/2020 1153 Last data filed at 2/17/2020 0887 Gross per 24 hour Intake 1985 ml Output 700 ml Net 1285 ml PHYSICAL EXAM: 
General: WD, WN. Alert, cooperative, no acute distress   
EENT:  EOMI. Anicteric sclerae. MMM Resp:  CTA bilaterally, no wheezing or rales. No accessory muscle use CV:  Regular  rhythm,  No edema GI:  Soft, Non distended, Non tender.  +Bowel sounds Neurologic:  Alert and oriented X 3, normal speech, Psych:   Good insight. Not anxious nor agitated Skin:  No rashes. No jaundice Reviewed most current lab test results and cultures  YES Reviewed most current radiology test results   YES Review and summation of old records today    NO Reviewed patient's current orders and MAR    YES 
PMH/ reviewed - no change compared to H&P 
________________________________________________________________________ Care Plan discussed with: 
  Comments Patient Family RN Care Manager Consultant Multidiciplinary team rounds were held today with , nursing, pharmacist and clinical coordinator. Patient's plan of care was discussed; medications were reviewed and discharge planning was addressed. ________________________________________________________________________ Total NON critical care TIME:  35 Minutes Total CRITICAL CARE TIME Spent:   Minutes non procedure based Comments >50% of visit spent in counseling and coordination of care    
________________________________________________________________________ Yrn Ariza MD  
 
Procedures: see electronic medical records for all procedures/Xrays and details which were not copied into this note but were reviewed prior to creation of Plan. LABS: 
I reviewed today's most current labs and imaging studies. Pertinent labs include: 
Recent Labs  
  02/17/20 
0346 02/16/20 
0349 02/15/20 
0030 WBC 5.6 7.9 6.2 HGB 11.9 10.1* 12.1 HCT 38.3 31.2* 37.8  201 214 Recent Labs  
  02/17/20 
0346 02/16/20 
0349 02/15/20 
1206 02/15/20 
0030  143  --  138  
K 3.8 3.9  --  4.2 * 114*  --  104 CO2 21 23  --  27  
GLU 74 96  --  241* BUN 15 22*  --  41* CREA 0.79 0.85  --  1.55* CA 10.4* 9.8  --  10.7* ALB 2.7* 2.5*  --  3.2* TBILI 2.4* 2.3*  --  1.0 SGOT 788* 1,553*  --  1,941* ALT 1,111* 1,520*  --  771* INR  --   --  1.1  --   
 
 
Signed: George Zaidi MD

## 2020-02-17 NOTE — PROGRESS NOTES
[address]  [phone] Cardiology Progress Note 2/17/2020 8:24 AM 
 
Admit Date: 2/14/2020 Admit Diagnosis:  
Elevated LFTs [R94.5] Subjective:  
 
Jacqui Sam has no c/o CP, SOB, no further vomiting, eating. Visit Vitals /77 Pulse 79 Temp 98 °F (36.7 °C) Resp 18 SpO2 96% Breastfeeding No  
 
 
Current Facility-Administered Medications Medication Dose Route Frequency  traMADol (ULTRAM) tablet 50 mg  50 mg Oral BID PRN  
 0.9% sodium chloride infusion  50 mL/hr IntraVENous CONTINUOUS  
 ondansetron (ZOFRAN) injection 4 mg  4 mg IntraVENous Q30MIN PRN  
 sodium chloride (NS) flush 5-40 mL  5-40 mL IntraVENous Q8H  
 sodium chloride (NS) flush 5-40 mL  5-40 mL IntraVENous PRN  
 bisacodyL (DULCOLAX) tablet 5 mg  5 mg Oral DAILY PRN  
 heparin (porcine) injection 5,000 Units  5,000 Units SubCUTAneous Q8H  
 doxazosin (CARDURA) tablet 2 mg  2 mg Oral DAILY  metoprolol tartrate (LOPRESSOR) tablet 12.5 mg  12.5 mg Oral BID  magnesium oxide (MAG-OX) tablet 400 mg  400 mg Oral DAILY  latanoprost (XALATAN) 0.005 % ophthalmic solution 1 Drop  1 Drop Both Eyes QHS  fluticasone propionate (FLONASE) 50 mcg/actuation nasal spray 2 Spray  2 Spray Both Nostrils DAILY  pantoprazole (PROTONIX) tablet 40 mg  40 mg Oral ACB  aspirin delayed-release tablet 81 mg  81 mg Oral DAILY  ticagrelor (BRILINTA) tablet 60 mg  60 mg Oral Q12H  
 isosorbide mononitrate ER (IMDUR) tablet 30 mg  30 mg Oral DAILY  albuterol (PROVENTIL VENTOLIN) nebulizer solution 2.5 mg  2.5 mg Nebulization Q6H PRN Objective:  
  
Physical Exam: 
General Appearance:  elderly AAF in no acute distress Chest:   Basilar rales Cardiovascular:  paced, no murmur. Abdomen:   Soft, non-tender, bowel sounds are active. Extremities: no edema Skin:  Warm and dry. Data Review:  
Recent Labs  
  02/17/20 
0346 02/16/20 
0349 02/15/20 
0030 WBC 5.6 7.9 6.2 HGB 11.9 10.1* 12.1 HCT 38.3 31.2* 37.8  201 214 Recent Labs  
  02/17/20 
0346 02/16/20 
0349 02/15/20 
1206 02/15/20 
0030  143  --  138  
K 3.8 3.9  --  4.2 * 114*  --  104 CO2 21 23  --  27  
GLU 74 96  --  241* BUN 15 22*  --  41* CREA 0.79 0.85  --  1.55* CA 10.4* 9.8  --  10.7* ALB 2.7* 2.5*  --  3.2* TBILI 2.4* 2.3*  --  1.0 SGOT 788* 1,553*  --  1,941* ALT 1,111* 1,520*  --  771* INR  --   --  1.1  --   
 
 
Recent Labs  
  02/16/20 
0349 02/15/20 
1329 02/15/20 
0712 02/15/20 
0409 02/15/20 
0030 TROIQ 0.18* 0.16* 0.12* 0.12* 0.12* Intake/Output Summary (Last 24 hours) at 2/17/2020 2982 Last data filed at 2/17/2020 8101 Gross per 24 hour Intake 1895 ml Output 850 ml Net 1045 ml Telemetry: paced Echo: 
· Normal cavity size and systolic function (ejection fraction normal). Mild concentric hypertrophy. Estimated left ventricular ejection fraction is 55 - 60%. Age-appropriate left ventricular diastolic function. · Mitral valve non-specific thickening. Trace mitral valve regurgitation is present. · Mild to moderate tricuspid valve regurgitation is present. · Moderate pulmonary hypertension. Assessment:  
 
Principal Problem: 
  Abdominal pain (2/16/2020) Active Problems: 
  Sick sinus syndrome ([address] (4/6/2013) Hypertension (4/6/2013) Mobitz type II atrioventricular block (3/7/2015) Pacemaker (3/9/2015) S/P PTCA (percutaneous transluminal coronary angioplasty) (4/9/2015) Coronary artery disease involving native coronary artery without angina pectoris (11/12/2015) Elevated LFTs (2/15/2020) Plan:  
 
Elevated troponin: 
Not cardiac related, trending is flat, likely r/t elevated LFTs, dehydration Echo with normal EF Prior cath with higher troponin showed patent stents. On indefinite DAPT due to prior NSTEMI's when Brilinta interrupted Pacemaker to be interrogated Abdominal pain with nausea and vomiting, elevated LFTs: 
Resolved GI following. OK for GI procedures as needed from a cardiac standpoint. 
  
Jamie Collazo [facility] Cardiology Patient seen and examined by me with the above nurse practitioner. I personally performed all components of the history, physical, and medical decision making and agree with the assessment and plan with minor modifications as noted. Minimal troponin elevation without symptoms concerning for angina. Echo essentially normal.  Proceed with GI evaluation and treatment. Will get pacemaker check to be thorough, to make sure there were no obvious dysrhythmias on Friday night that could have contributed to symptoms, although symptoms do not sound like arrhythmia.

## 2020-02-17 NOTE — PROGRESS NOTES
General Surgery End of Shift Nursing Note Shift worked:   [id number] Summary of shift:    Patient remained up in the chair all shift. Patient was bathed this AM. Patient did not have any complaints of pain today and was not medicated with PRN pain medications. GI following, monitoring liver enzymes. Patient did not have a BM today. Issues for physician to address:   None at this time. Number times ambulated in hallway past shift: 0 Number of times OOB to chair past shift: 3 Pain Management: 
Current medication: Tramadol Patient states pain is manageable on current pain medication: YES 
 
GI: 
 
Current diet:  DIET GI LITE (POST SURGICAL) Tolerating current diet: YES Passing flatus: YES Last Bowel Movement: yesterday Appearance: unknown Respiratory: 
 
Incentive Spirometer at bedside: YES Patient instructed on use: YES Patient Safety: 
 
Falls Score: 2 Bed Alarm On? No 
Sitter?  No 
 
Sheba Davis LPN

## 2020-02-17 NOTE — PROGRESS NOTES
Physical Therapy Screening: [facility] for [facility] therapy based on results obtained during the nursing admission assessment. The patients chart was reviewed and the patient is appropriate for a skilled therapy evaluation if there is a decline in functional mobility from baseline. Please order a consult for physical therapy if you are in agreement and would like an evaluation to be completed. Thank you.  
 
Audrey Bond, PT, DPT

## 2020-02-18 NOTE — PROGRESS NOTES
Verbal shift change report given to Melisa Cervantes (oncoming nurse) by Agustín Skelton (offgoing nurse). Report included the following information SBAR, Kardex, Intake/Output, MAR and Recent Results. Pt uneventful. No c/o of abdominal pain. Sat in recliner during shift and returned to bed near shift change. Pacemaker interrogated. No BM today.

## 2020-02-18 NOTE — WOUND CARE
Wound care nurse consult from staff nurse for incontinence ARIS. Patient is a 81 y/o AAF admitted for abdominal pain. She is incontinent of urine. Past Medical History:  
Diagnosis Date  Asthma  CAD (coronary artery disease)  Diabetes ([address]  Hypertension  Irregular heart rate 4/25/13  Morbid obesity ([facility] [address]  Other ill-defined conditions(799.89)   
 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 Recommend: 
 
Buttocks, gluteal cleft and groins: cleanse gently with soap and water. Pat dry and apply Secura antifungal zinc cream to skin.  
 
Fahad Parsons RN, [employer]

## 2020-02-18 NOTE — PROGRESS NOTES
Hospitalist Progress Note NAME: Stan Bonner :  1927 MRN:  [id number] Assessment / Plan: 
 
Elevated LFTs cause unknown, improving Admit patient to telemetry  GI consultation, need out patient follow up  Check ammonia level, normal  
Check hepatitis profile negative  Check PT/INR 
 liver enzyme slowly coming down Gastroenterology will see patient prior to discharge HTN continue home antihypertensive medication 
  
Hyperlipidemiahold statin because of elevated LFT 
  
Elevated troponin  Monitor troponin 
-Echocardiogram Reviewed   
  
History of coronary artery disease Continue aspirin 
  
CHF diastolic chronichold Bumex worsening renal function  
  
Acute on chronic renal failure -IV fluid  
-monitoring  
  
Code Status: FULL Surrogate Decision Maker:Alma Rosa Khalil 
  
DVT Prophylaxis: HEPARIN  
GI Prophylaxis: not indicated 
  
Disposition home, PT evaluation pending Subjective: Chief Complaint / Reason for Physician Visit \" I am feeling better   \". Discussed with RN events overnight. Review of Systems: 
Symptom Y/N Comments  Symptom Y/N Comments Fever/Chills    Chest Pain Poor Appetite    Edema Cough    Abdominal Pain Sputum    Joint Pain SOB/DOTSON    Pruritis/Rash Nausea/vomit    Tolerating PT/OT Diarrhea    Tolerating Diet Constipation    Other Could NOT obtain due to:   
 
Objective: VITALS:  
Last 24hrs VS reviewed since prior progress note. Most recent are: 
Patient Vitals for the past 24 hrs: 
 Temp Pulse Resp BP SpO2  
20 1118 97.9 °F (36.6 °C) 62 17 146/62 100 % 20 0752 98.4 °F (36.9 °C) 70 17 150/82 97 % 20 0412 97.9 °F (36.6 °C) 61 17 168/66 97 % 20 0026 98.1 °F (36.7 °C) 71 18 148/90 100 % 20 1853 98.1 °F (36.7 °C) 63 18 122/62 98 % 20 1453 97.6 °F (36.4 °C) 69 18 120/47 96 % Intake/Output Summary (Last 24 hours) at 2020 1151 Last data filed at 2/18/2020 6909 Gross per 24 hour Intake  Output 250 ml Net -250 ml PHYSICAL EXAM: 
General: WD, WN. Alert, cooperative, no acute distress   
EENT:  EOMI. Anicteric sclerae. MMM Resp:  CTA bilaterally, no wheezing or rales. No accessory muscle use CV:  Regular  rhythm,  No edema GI:  Soft, Non distended, Non tender.  +Bowel sounds Neurologic:  Alert and oriented X 3, normal speech, Psych:   Good insight. Not anxious nor agitated Skin:  No rashes. No jaundice Reviewed most current lab test results and cultures  YES Reviewed most current radiology test results   YES Review and summation of old records today    NO Reviewed patient's current orders and MAR    YES 
PMH/SH reviewed - no change compared to H&P 
________________________________________________________________________ Care Plan discussed with: 
  Comments Patient Family RN Care Manager Consultant Multidiciplinary team rounds were held today with , nursing, pharmacist and clinical coordinator. Patient's plan of care was discussed; medications were reviewed and discharge planning was addressed. ________________________________________________________________________ Total NON critical care TIME:  35 Minutes Total CRITICAL CARE TIME Spent:   Minutes non procedure based Comments >50% of visit spent in counseling and coordination of care    
________________________________________________________________________ Nita Serna MD  
 
Procedures: see electronic medical records for all procedures/Xrays and details which were not copied into this note but were reviewed prior to creation of Plan. LABS: 
I reviewed today's most current labs and imaging studies. Pertinent labs include: 
Recent Labs  
  02/18/20 
0949 02/17/20 
0346 02/16/20 
0349 WBC 6.9 5.6 7.9 HGB 10.4* 11.9 10.1* HCT 32.6* 38.3 31.2*  
 206 201 Recent Labs 02/18/20 
1579 02/17/20 
0304 02/16/20 
0349 02/15/20 
1206  142 143  --   
K 3.6 3.8 3.9  --   
* 115* 114*  --   
CO2 22 21 23  --   
* 74 96  --   
BUN 9 15 22*  --   
CREA 0.70 0.79 0.85  --   
CA 10.0 10.4* 9.8  --   
ALB 2.5* 2.7* 2.5*  --   
TBILI 1.6* 2.4* 2.3*  --   
SGOT 244* 788* 1,553*  --   
* 1,111* 1,520*  --   
INR 1.0  --   --  1.1 Signed: García Rogers MD

## 2020-02-18 NOTE — PROGRESS NOTES
19:30- Received oncoming report from [address] [place] [address], [address] [place] [address] 
 
23:00- 
General Surgery End of Shift Nursing Note Bedside shift change report given to [address] [place] [address] (oncoming nurse) by [place] (offgoing nurse). Report included the following information SBAR, Kardex, Intake/Output, MAR and Recent Results. Shift worked:   7-11p Summary of shift:    uneventful Issues for physician to address:   none Arely Bloom RN

## 2020-02-18 NOTE — PROGRESS NOTES
[address] [place] [address]  [phone] Cardiology Progress Note 2/18/2020 3:27 PM 
 
Admit Date: 2/14/2020 Admit Diagnosis:  
Elevated LFTs [R94.5] Subjective:  
 
Bill Monte is doing much better today. Visit Vitals /62 Pulse 62 Temp 97.9 °F (36.6 °C) Resp 17 SpO2 100% Breastfeeding No  
 
 
Current Facility-Administered Medications Medication Dose Route Frequency  miconazole (SECURA) 2 % extra thick cream   Topical BID  traMADol (ULTRAM) tablet 50 mg  50 mg Oral BID PRN  
 ondansetron (ZOFRAN) injection 4 mg  4 mg IntraVENous Q30MIN PRN  
 sodium chloride (NS) flush 5-40 mL  5-40 mL IntraVENous Q8H  
 sodium chloride (NS) flush 5-40 mL  5-40 mL IntraVENous PRN  
 bisacodyL (DULCOLAX) tablet 5 mg  5 mg Oral DAILY PRN  
 heparin (porcine) injection 5,000 Units  5,000 Units SubCUTAneous Q8H  
 doxazosin (CARDURA) tablet 2 mg  2 mg Oral DAILY  metoprolol tartrate (LOPRESSOR) tablet 12.5 mg  12.5 mg Oral BID  magnesium oxide (MAG-OX) tablet 400 mg  400 mg Oral DAILY  latanoprost (XALATAN) 0.005 % ophthalmic solution 1 Drop  1 Drop Both Eyes QHS  fluticasone propionate (FLONASE) 50 mcg/actuation nasal spray 2 Spray  2 Spray Both Nostrils DAILY  pantoprazole (PROTONIX) tablet 40 mg  40 mg Oral ACB  aspirin delayed-release tablet 81 mg  81 mg Oral DAILY  ticagrelor (BRILINTA) tablet 60 mg  60 mg Oral Q12H  
 isosorbide mononitrate ER (IMDUR) tablet 30 mg  30 mg Oral DAILY  albuterol (PROVENTIL VENTOLIN) nebulizer solution 2.5 mg  2.5 mg Nebulization Q6H PRN Objective:  
  
Physical Exam: 
General Appearance:  elderly AAF in no acute distress Chest:   Clear Cardiovascular:  Regular rate and rhythm, no murmur. Abdomen:   Soft, non-tender, bowel sounds are active. Extremities: no peripheral edema Skin:  Warm and dry. Data Review:  
Recent Labs  
  02/18/20 
0949 02/17/20 
0346 02/16/20 
0349 WBC 6.9 5.6 7.9 HGB 10.4* 11.9 10.1* HCT 32.6* 38.3 31.2*  
 206 201 Recent Labs  
  02/18/20 
0949 02/17/20 
0346 02/16/20 
0349  142 143  
K 3.6 3.8 3.9 * 115* 114* CO2 22 21 23 * 74 96 BUN 9 15 22* CREA 0.70 0.79 0.85 CA 10.0 10.4* 9.8 ALB 2.5* 2.7* 2.5* TBILI 1.6* 2.4* 2.3*  
SGOT 244* 788* 1,553* * 1,111* 1,520* INR 1.0  --   --   
 
 
Recent Labs  
  02/16/20 
0349 TROIQ 0.18* Intake/Output Summary (Last 24 hours) at 2/18/2020 1527 Last data filed at 2/18/2020 6363 Gross per 24 hour Intake  Output 250 ml Net -250 ml Telemetry: SR  
 
Assessment:  
 
Principal Problem: 
  Abdominal pain (2/16/2020) Active Problems: 
  Sick sinus syndrome ([address] (4/6/2013) Hypertension (4/6/2013) Mobitz type II atrioventricular block (3/7/2015) Pacemaker (3/9/2015) S/P PTCA (percutaneous transluminal coronary angioplasty) (4/9/2015) Coronary artery disease involving native coronary artery without angina pectoris (11/12/2015) Elevated LFTs (2/15/2020) Plan: Abdominal pain with nausea and vomiting, elevated LFTs,  
normal abdominal ultrasound and abdominal CT. GI following. Chronically minimally elevated troponin elevation without chest pain or complications, echo normal.   No evidence of acute coronary syndrome. Last catheterization in 2016 showed widely patent stents in the setting of a higher troponin. On indefinite DAPT due to prior NSTEMI's when Brilinta interrupted. Pacemaker interrogation negative for significant arrhythmia. Low suspicion for cardiac cause of events. Certainly there is no evidence of LV or RV dysfunction that would cause LFT elevation and there is no evidence of myocardial infarction. RCA signing off. Thank you for consulting [facility] Cardiology Patient seen and examined by me with the above nurse practitioner.   I personally performed all components of the history, physical, and medical decision making and agree with the assessment and plan with minor modifications as noted. No evidence of cardiac event. Proceed with GI evaluation and treatment as needed, follow-up  with me as scheduled. Thanks for the consult. We will sign off for now. Please call if any questions or concerns.

## 2020-02-18 NOTE — PROGRESS NOTES
[id number] [facility] Progress Note TERESA Trujillo 
 for Dr. Annabelle Haro .. F/U for elevated liver tests Abdominal pain, nausea and vomiting. S: Ms. Jenna Green was seen by me today during rounds. At this time, she is resting + comfortably. She c/o abdominal pain this morning in epigastrium and umbilical area. no nausea and no vomiting since admission. Both hpi and ros are limited by this and the patient's memory disturbance. No family at bedside. The patient has no other new complaints today. Please see admission consult for details of ROS; there are no changes today. O: Blood pressure 168/66, pulse 61, temperature 97.9 °F (36.6 °C), resp. rate 17, SpO2 97 %, not currently breastfeeding. .   Gen: Elderly BF, Patient is in no acute distress. Baseline dementia. There is no jaundice. Lungs: Clear to auscultation bilaterally . Heart:+RRR. Abd: Soft, mild RUQ tenderness without guarding or rebound, non-distended, bowel sounds present. Extremities: Warm, +edema Recent Labs  
  02/17/20 
0346 02/16/20 
0349 WBC 5.6 7.9 HGB 11.9 10.1* HCT 38.3 31.2*  
 201 Recent Labs  
  02/17/20 
0346 02/16/20 
0349  143  
K 3.8 3.9 * 114* CO2 21 23 BUN 15 22* CREA 0.79 0.85 GLU 74 96 CA 10.4* 9.8 Recent Labs  
  02/17/20 
0346 02/16/20 
0349 SGOT 788* 1,553* ALT 1,111* 1,520* * 364* TBILI 2.4* 2.3* TP 6.9 6.0* ALB 2.7* 2.5*  
GLOB 4.2* 3.5 Recent Labs  
  02/15/20 
1206 INR 1.1 PTP 10.8 No results for input(s): FE, TIBC, PSAT, FERR in the last 72 hours. No results found for: FOL, RBCF No results for input(s): PH, PCO2, PO2 in the last 72 hours. Recent Labs  
  02/16/20 
0349 02/15/20 
1329 TROIQ 0.18* 0.16* Lab Results Component Value Date/Time  Cholesterol, total 128 03/28/2017 10:46 AM  
 HDL Cholesterol 43 03/28/2017 10:46 AM  
 LDL, calculated 70 03/28/2017 10:46 AM  
 Triglyceride 76 03/28/2017 10:46 AM  
 CHOL/HDL Ratio 2.5 01/05/2015 02:25 AM  
 
Lab Results Component Value Date/Time Glucose (POC) 103 (H) 02/16/2020 06:57 AM  
 Glucose (POC) 134 (H) 08/18/2016 11:21 AM  
 Glucose (POC) 99 08/18/2016 08:13 AM  
 Glucose (POC) 133 (H) 08/17/2016 09:39 PM  
 Glucose (POC) 119 (H) 08/17/2016 04:25 PM  
 
Lab Results Component Value Date/Time Color YELLOW/STRAW 02/15/2020 04:41 AM  
 Appearance CLEAR 02/15/2020 04:41 AM  
 Specific gravity 1.020 02/15/2020 04:41 AM  
 Specific gravity >1.030 (H) 11/13/2018 09:38 PM  
 pH (UA) 6.0 02/15/2020 04:41 AM  
 Protein 30 (A) 02/15/2020 04:41 AM  
 Glucose NEGATIVE  02/15/2020 04:41 AM  
 Ketone NEGATIVE  02/15/2020 04:41 AM  
 Bilirubin NEGATIVE  03/02/2019 02:57 PM  
 Urobilinogen 2.0 (H) 02/15/2020 04:41 AM  
 Nitrites NEGATIVE  02/15/2020 04:41 AM  
 Leukocyte Esterase NEGATIVE  02/15/2020 04:41 AM  
 Epithelial cells FEW 02/15/2020 04:41 AM  
 Bacteria NEGATIVE  02/15/2020 04:41 AM  
 WBC 0-4 02/15/2020 04:41 AM  
 RBC 0-5 02/15/2020 04:41 AM  
  
Cross sectional imaging:  Nothing new A: Principal Problem: 
  Abdominal pain (2/16/2020) Active Problems: 
  Sick sinus syndrome ([address] (4/6/2013) Hypertension (4/6/2013) Mobitz type II atrioventricular block (3/7/2015) Pacemaker (3/9/2015) S/P PTCA (percutaneous transluminal coronary angioplasty) (4/9/2015) Coronary artery disease involving native coronary artery without angina pectoris (11/12/2015) Elevated LFTs (2/15/2020) Comment: I reviewed Dr Sonya Loyola note The ddx is Right heart failure- Echo normal, Dr. Roro Raphael note reviewed INR Not elevated as would be expected in Zone 3 necrosis Passing a stone more likely dx Toxic Other cause of hepatitis being evaluated however less likely P: Full serological w/u ordered MRI cancelled because pacemaker is not MRI compatible per MRI staff Holding off on eus or ercp for today, continue GI lite diet We will arrange O/P f/u, if serological w/u non revealing proceed with EUS 
LFTs trending down D/C plans per hospitalist 
 
TERESA Betts 
11:37 AM 
 
2/18/2020

## 2020-02-18 NOTE — PROGRESS NOTES
SABAS Plan: *SNF-[facility] 3:17pm 
Patient has been accepted to [facility]. Per pt/daughter request, CM will send a referral to [facility]. CM will continue to follow. [facility] Sites Ext [phone]

## 2020-02-19 NOTE — PROGRESS NOTES
Transition of Care Plan to SNF/Rehab Communication to Patient/Family: Met with patient and family and they are agreeable to the transition plan. The Plan for Transition of Care is related to the following treatment goals: The Patient and/or patient representative was provided with a choice of provider and agrees  with the discharge plan. Yes [x] No [] A Freedom of choice list was provided with basic dialogue that supports the patient's individualized plan of care/goals and shares the quality data associated with the providers. Yes [x] No [] SNF/Rehab Transition: 
Patient has been accepted to [address] [place], [place] [address] SNF/Rehab and meets criteria for admission. Patient will transported by family and expected to leave at 11am. 
 
Communication to SNF/Rehab: 
Bedside RN, , has been notified to update the transition plan to the facility and call report (phone number). Discharge information has been updated on the AVS. And communicated to facility via [employer], or CC link. Discharge instructions to be fax'd to facility at [facility] #). Nursing Please include all hard scripts for controlled substances, med rec and dc summary, and AVS in packet. Reviewed and confirmed with facility,  can manage the patient care needs for the following:  
 
Ying Jung with (X) only those applicable: 
Medication: 
[]Medications are available at the facility []IV Antibiotics []Controlled Substance  hard copies available sent. []Weekly Labs Equipment: 
[]CPAP/BiPAP []Wound Vacuum [][place] or Urinary Device []PICC/Central Line []Nebulizer []Ventilator Treatment: 
[]Isolation (for MRSA, VRE, etc.) []Surgical Drain Management []Tracheostomy Care 
[]Dressing Changes []Dialysis with transportation []PEG Care []Oxygen []Daily Weights for Heart Failure Dietary: 
[]Any diet limitations []Tube Feedings []Total Parenteral Management (TPN) Financial Resources: []Medicaid Application Completed []UAI Completed and copy given to pt/family 
and copy given to pt/family 
[]A screening has previously been completed. []Level II Completed 
 
[] Private pay individual who will not become  
financially eligible for Medicaid within 6 months from admission to a [address] [place] [address] facility. [] Individual refused to have screening conducted. []Medicaid Application Completed []The screening denied because it was determined individual did not need/did not qualify for nursing facility level of care. [] Out of state residents seeking direct admission to a [address] facility. [] Individuals who are inpatients of an out of state hospital, or in state or out of state veterans/ hospital and seek direct admission to a [address] facility [] Individuals who are pateints or residents of a state owned/operated facility that is licensed by Department of [employer] ([employer]) and seek direct admission to [address] facility [] A screening not required for enrollment in [facility] services as set out in 12 [address]AC 30- [] [facility]) staff shall perform screenings of the [facility] clients. Advanced Care Plan: 
[x]Surrogate Decision Maker of Care 
[]POA []Communicated Code Status and copy sent. Daughter, Jeaneth Ybarra [phone] Other:

## 2020-02-19 NOTE — PROGRESS NOTES
Hospital to [address] 80 [address].   female Liss 34   Room: 2135/01    [facility] 2 GENERAL SURGERY  Unit Phone# :  [phone] 
[facility] 2 GENERAL SURGERY 
[address] [place] [address] Dept: [phone] Loc: [id number] SITUATION Admitted:  2/14/2020         Attending Provider:  Jordan Brantley MD    
 
Consultations:  IP CONSULT TO GASTROENTEROLOGY 
IP CONSULT TO CARDIOLOGY 
 
PCP:  Stacie Galvez NP   [phone] Treatment Team: Attending Provider: Jordan Brantley MD; Care Manager: Consuelo White Consulting Provider: Cristian Nielson MD; Care Manager: Laney Giraldo; Staff Nurse: Marianela Allen LPN; Staff Nurse: Chriss Sandhoff Admitting Dx:  Elevated LFTs [R94.5] Principal Problem: Abdominal pain * No surgery found * of  
  
BY: * Surgery not found *             ON: * No surgery found * Code Status: Full Code Advance Directives:  
Advance Care Planning 2/15/2020 Patient's Healthcare Decision Maker is: -  
Primary Decision Maker Name -  
Primary Decision Maker Phone Number -  
Primary Decision Maker Relationship to Patient -  
Confirm Advance Directive None Patient Would Like to Complete Advance Directive - (Send w/patient) Not Received Isolation:  There are currently no Active Isolations       MDRO: No current active infections Pain Medications given:  none Special Equipment needed: no BACKGROUND Allergies: Allergies Allergen Reactions  Pcn [Penicillins] Unknown (comments) Past Medical History:  
Diagnosis Date  Asthma  CAD (coronary artery disease)  Diabetes [address] [facility]  Hypertension  Irregular heart rate 4/25/13  Morbid obesity ([facility] [address]  Other ill-defined conditions(799.89)   
 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  
Procedure Laterality Date  COLONOSCOPY N/A 8/15/2016 COLONOSCOPY performed by Talia Soria MD at [facility] ENDOSCOPY  
 HX CHOLECYSTECTOMY  HX CORONARY STENT PLACEMENT    
 HX HEART CATHETERIZATION    
 HX PACEMAKER    
 HX PACEMAKER PLACEMENT Left Medications Prior to Admission Medication Sig  
 metoprolol tartrate (LOPRESSOR) 25 mg tablet TAKE 1/2 TABLET BY MOUTH TWICE A DAY  isosorbide mononitrate ER (IMDUR) 30 mg tablet take 1 tablet by mouth once daily TO HELP PEVENT CHEST PAINS  BRILINTA 60 mg tab tablet take 1 tablet by mouth twice a day  busPIRone (BUSPAR) 5 mg tablet Take 5 mg by mouth two (2) times a day.  potassium chloride SR (KLOR-CON 10) 10 mEq tablet  morphine sulfate/PF (MORPHINE, PF,) soln by Intrathecal route continuous. CREAM APPLIED DAILY FOR FOOT PAIN  
 Cetirizine (ZYRTEC) 10 mg cap Take  by mouth daily.  doxazosin (CARDURA) 2 mg tablet Take 2 mg by mouth daily.  bumetanide (BUMEX) 1 mg tablet Take 1 Tab by mouth daily. (Patient taking differently: Take 1 mg by mouth daily. 1 and 1/2 daily)  magnesium oxide (MAG-OX) 400 mg tablet Take 1 Tab by mouth daily.  pantoprazole (PROTONIX) 40 mg tablet Take 1 Tab by mouth Daily (before breakfast).  traMADol (ULTRAM) 50 mg tablet Take 1 Tab by mouth every six (6) hours as needed for Pain. Max Daily Amount: 200 mg.  ferrous sulfate 325 mg (65 mg iron) tablet Take 1 Tab by mouth two (2) times daily (with meals).  acetaminophen (TYLENOL) 325 mg tablet Take 1,300 mg by mouth nightly.  dextran 70-hypromellose (ARTIFICIAL TEARS) ophthalmic solution Administer 1 Drop to both eyes as needed.  fluticasone (FLONASE) 50 mcg/actuation nasal spray 2 Sprays by Both Nostrils route daily.  allopurinol (ZYLOPRIM) 100 mg tablet Take  by mouth daily.  aspirin delayed-release 81 mg tablet Take 81 mg by mouth daily.  albuterol (PROAIR HFA) 90 mcg/actuation inhaler Take 2 Puffs by inhalation every six (6) hours as needed for Wheezing. 2 PUFFS 4 TIMES DAILY  pravastatin (PRAVACHOL) 20 mg tablet Take 1 Tab by mouth nightly.  latanoprost (XALATAN) 0.005 % ophthalmic solution Administer 1 Drop to both eyes nightly.  diphenoxylate-atropine (LOMOTIL) 2.5-0.025 mg per tablet Take 1 Tab by mouth four (4) times daily as needed for Diarrhea (1 tab after each stool for max 8 per day). Max Daily Amount: 4 Tabs. Take after each stool for a maximum of 8 tablets daily  nitroglycerin (NITROSTAT) 0.4 mg SL tablet place 1 tablet under the tongue if needed every 5 minutes for chest pain for 3 doses IF NO RELIEF AFTER 3RD DOSE CALL PRESCRIBER .  nystatin (MYCOSTATIN) 100,000 unit/gram ointment Apply  to affected area two (2) times a day.  triamcinolone acetonide (KENALOG) 0.5 % ointment Apply  to affected area two (2) times a day. use thin layer  polyethylene glycol (MIRALAX) 17 gram/dose powder Take 17 g by mouth daily as needed. 1 tablespoon with 8 oz of water daily  [DISCONTINUED] gabapentin (NEURONTIN) 100 mg capsule Take 2 Caps by mouth three (3) times daily. Hard scripts included in transfer packet yes Vaccinations:   
Immunization History Administered Date(s) Administered  Influenza Vaccine 10/01/2014  Pneumococcal Polysaccharide (PPSV-23) 04/07/2013 Readmission Risks:    Known Risks: see Care Management note The Charlson CoMorbitiy Index tool is an evidenced based tool that has more automatic generated information. The tool looks at many different items such as the age of the patient, how many times they were admitted in the last calendar year, current length of stay in the hospital and their diagnosis.  All of these items are pulled automatically from information documented in the chart from various places and will generate a score that predicts whether a patient is at low (less than 13), medium (13-20) or high (21 or greater) risk of being readmitted. ASSESSMENT Temp: 97.7 °F (36.5 °C) (02/19/20 0750) Pulse (Heart Rate): 99 (02/19/20 0750) Resp Rate: 18 (02/19/20 0750)           BP: 149/76 (02/19/20 0750) O2 Sat (%): 99 % (02/19/20 0750) Height: (not recorded) If above not within 1 hour of discharge: 
 
BP:_____  P:____  R:____ T:_____ O2 Sat: ___%  O2: ______ Active Orders Diet DIET GI LITE (POST SURGICAL) Orientation: only aware of  place, person and situation sometimes time can be forgetful but is appropriate for age Active Behaviors: None Active Lines/Drains:  (Peg Tube / Carranza / CL or S/L?): no 
 
Urinary Status: External catheter     Last BM: Last Bowel Movement Date: 02/13/20 Skin Integrity: Excoriation(sacrum) Mobility: Very limited Weight Bearing Status: WBAT (Weight Bearing as Tolerated) Lab Results Component Value Date/Time Glucose 109 (H) 02/19/2020 03:31 AM  
 INR 1.0 02/18/2020 09:49 AM  
 INR 1.1 02/15/2020 12:06 PM  
 HGB 10.4 (L) 02/18/2020 09:49 AM  
 HGB 11.9 02/17/2020 03:46 AM  
  
  RECOMMENDATION See After Visit Summary (AVS) for: · Discharge instructions · After [address] · Special equipment needed (entered pre-discharge by Care Management) · Medication Reconciliation · Follow up Appointment(s) Report given/sent by:  Delroy Rodriguse Verbal report given to: Cathleen Goodrich Estimated discharge time:  2/19/2020 at 1130

## 2020-02-19 NOTE — DISCHARGE INSTRUCTIONS
HOSPITALIST DISCHARGE INSTRUCTIONS    NAME: Stan Bonner   :  1927   MRN:  [id number]     Date/Time:  2020 8:38 AM    ADMIT DATE: 2020   DISCHARGE DATE: 2020     Attending Physician: Janell Fontaine MD    DISCHARGE DIAGNOSIS:  Elevated LFTs  HTN   Hyperlipidemia   Elevated troponin  Coronary artery disease   CHF diastolic chronic  Acute on chronic renal failure     Medications: Per above medication reconciliation. Pain Management: per above medications    Recommended diet: Cardiac Diet    Recommended activity: PT/OT Eval and Treat    Wound care: Buttocks, gluteal cleft and groins: cleanse gently with soap and water. Pat dry and apply Secura antifungal zinc cream to skin. Indwelling devices:  None    Supplemental Oxygen: None    Required Lab work: LFTs in 3-5 days    Glucose management:  None    Code status: Full        Outside physician follow up: Follow-up Information     Follow up With Specialties Details Why Contact [address] [place] [address]  [phone]                 Skilled nursing facility/ SNF MD responsible for above on discharge. Information obtained by :  I understand that if any problems occur once I am at home I am to contact my physician. I understand and acknowledge receipt of the instructions indicated above.                                                                                                                                            Physician's or R.N.'s Signature                                                                  Date/Time                                                                                                                                              Patient or Repres

## 2020-02-19 NOTE — DISCHARGE SUMMARY
Hospitalist Discharge Summary Patient ID: 
Leland Alegre [id number] 
36 y.o. 
5/17/1927 2/14/2020 PCP on record: Shira Lee NP Admit date: 2/14/2020 Discharge date and time: 2/19/2020 DISCHARGE DIAGNOSIS: 
Elevated LFTs HTN  
Hyperlipidemia  
Elevated troponin Coronary artery disease  
CHF diastolic chronic Acute on chronic renal failure CONSULTATIONS: 
IP CONSULT TO GASTROENTEROLOGY 
IP CONSULT TO CARDIOLOGY Excerpted HPI from H&P of Ruben Zendejas MD: 
80years old female from home with past medical history significant for diabetes, coronary artery disease, hypertension presented to the hospital for evaluation of abdominal pain started since yesterday associated with nausea vomiting denies any fever any chills denies any diarrhea, CT abdomen was done and it was negative for any acute abnormality, blood work was done was significant for elevated LFT. We were asked to admit for work up and evaluation of the above problems. ______________________________________________________________________ DISCHARGE SUMMARY/HOSPITAL COURSE:  for full details see H&P, daily progress notes, labs, consult notes. Elevated LFTs cause unknown, improving Admitted patient to hospitalist service on telemetry Patient was seen in consultation by GI and abdominal US obtained, with no abnormal findings noted. Suspect LFT elevation was due to ischemia versus CBD stone vs medication effect or acute right heart failure (though the latter was felt unlikely per cardiology) Patient to see GI in follow up as an outpatient and follow up any pending serologies at that time Held pravastatin, will continue to hold on discharge until LFTs have shown further improvement -- address at outpatient PCP follow up HTN continued home antihypertensive medication 
  
Hyperlipidemiaheld statin because of elevated LFT. Reassess as outpatient after discharge. 
  
Elevated troponin Flat trend Patient felt not to have acute coronary ischemia per cardiology Echocardiogram obtained, LVEF 55-60%, age-appropriate LVDD noted, moderate PH 
  
History of coronary artery disease Continue aspirin 
  
CHF diastolic chronicheld Bumex due to elevated creatinine on admission; can resume on discharge.  
  
Acute on chronic renal failure  
-Improved with IV fluids and holding diuretic 
 
 
 
_______________________________________________________________________ Patient seen and examined by me on discharge day. Pertinent Findings: 
Gen:    Not in distress, upright in chair eating breakfast 
Chest: L basilar crackle, otherwise clear, no wheezes or rhonchi CVS:   Regular rhythm. No edema Abd:  Soft, not distended, not tender, no hepatomegaly detected Neuro:  Alert, oriented to self, place, month 
_______________________________________________________________________ DISCHARGE MEDICATIONS:  
Current Discharge Medication List  
  
CONTINUE these medications which have CHANGED Details  
gabapentin (NEURONTIN) 100 mg capsule Take 2 Caps by mouth three (3) times daily. Qty: 90 Cap, Refills: 0 Associated Diagnoses: Neuropathic pain CONTINUE these medications which have NOT CHANGED Details  
metoprolol tartrate (LOPRESSOR) 25 mg tablet TAKE 1/2 TABLET BY MOUTH TWICE A DAY Qty: 90 Tab, Refills: 0  
  
isosorbide mononitrate ER (IMDUR) 30 mg tablet take 1 tablet by mouth once daily TO HELP PEVENT CHEST PAINS Qty: 90 Tab, Refills: 3 BRILINTA 60 mg tab tablet take 1 tablet by mouth twice a day 
Qty: 60 Tab, Refills: 11  
 Associated Diagnoses: Coronary artery disease involving native coronary artery of native heart without angina pectoris  
  
busPIRone (BUSPAR) 5 mg tablet Take 5 mg by mouth two (2) times a day. Refills: 0  
  
potassium chloride SR (KLOR-CON 10) 10 mEq tablet Refills: 1 Cetirizine (ZYRTEC) 10 mg cap Take  by mouth daily. doxazosin (CARDURA) 2 mg tablet Take 2 mg by mouth daily. Refills: 1  
  
bumetanide (BUMEX) 1 mg tablet Take 1 Tab by mouth daily. Qty: 30 Tab, Refills: 1  
  
magnesium oxide (MAG-OX) 400 mg tablet Take 1 Tab by mouth daily. Qty: 30 Tab, Refills: 1  
  
pantoprazole (PROTONIX) 40 mg tablet Take 1 Tab by mouth Daily (before breakfast). Qty: 30 Tab, Refills: 1  
  
traMADol (ULTRAM) 50 mg tablet Take 1 Tab by mouth every six (6) hours as needed for Pain. Max Daily Amount: 200 mg. Qty: 40 Tab, Refills: 0  
  
ferrous sulfate 325 mg (65 mg iron) tablet Take 1 Tab by mouth two (2) times daily (with meals). Qty: 60 Tab, Refills: 1  
  
dextran 70-hypromellose (ARTIFICIAL TEARS) ophthalmic solution Administer 1 Drop to both eyes as needed. fluticasone (FLONASE) 50 mcg/actuation nasal spray 2 Sprays by Both Nostrils route daily. allopurinol (ZYLOPRIM) 100 mg tablet Take  by mouth daily. aspirin delayed-release 81 mg tablet Take 81 mg by mouth daily. albuterol (PROAIR HFA) 90 mcg/actuation inhaler Take 2 Puffs by inhalation every six (6) hours as needed for Wheezing. 2 PUFFS 4 TIMES DAILY  
  
latanoprost (XALATAN) 0.005 % ophthalmic solution Administer 1 Drop to both eyes nightly. nitroglycerin (NITROSTAT) 0.4 mg SL tablet place 1 tablet under the tongue if needed every 5 minutes for chest pain for 3 doses IF NO RELIEF AFTER 3RD DOSE CALL PRESCRIBER . Qty: 25 Tab, Refills: 1  
  
triamcinolone acetonide (KENALOG) 0.5 % ointment Apply  to affected area two (2) times a day. use thin layer  
  
polyethylene glycol (MIRALAX) 17 gram/dose powder Take 17 g by mouth daily as needed. 1 tablespoon with 8 oz of water daily 
Qty: 119 g, Refills: 0 STOP taking these medications  
  
 morphine sulfate/PF (MORPHINE, PF,) soln Comments:  
Reason for Stopping:   
   
 acetaminophen (TYLENOL) 325 mg tablet Comments:  
Reason for Stopping: pravastatin (PRAVACHOL) 20 mg tablet Comments:  
Reason for Stopping:   
   
 diphenoxylate-atropine (LOMOTIL) 2.5-0.025 mg per tablet Comments:  
Reason for Stopping:   
   
 nystatin (MYCOSTATIN) 100,000 unit/gram ointment Comments:  
Reason for Stopping:   
   
  
 
 
 
Patient Follow Up Instructions: Activity: Activity as tolerated Diet: Cardiac Diet Wound Care: miconazole to buttocks and groin Follow-up with PCP in 1 week. Outpatient GI follow up to be arranged. Follow-up tests/labs: LFTs in 3-5 days. Follow-up Information Follow up With Specialties Details Why Contact Info [facility] [address] [facility] 
[phone] Amaya Perdue NP [address] [phone] 
  
  
 
________________________________________________________________ Risk of deterioration: Moderate Condition at Discharge:  Stable 
__________________________________________________________________ Disposition SNF/LTC 
 
____________________________________________________________________ Code Status: Full Code 
___________________________________________________________________ Total time in minutes spent coordinating this discharge (includes going over instructions, follow-up, prescriptions, and preparing report for sign off to her PCP) :  45 minutes Signed: 
Israel Winters MD

## 2020-02-19 NOTE — PROGRESS NOTES
General Surgery End of Shift Nursing Note Bedside shift change report given to Krysten Marrero RN (oncoming nurse) by Bandar Figueroa RN (offgoing nurse). Report included the following information SBAR, Kardex and MAR. Shift worked:   [id number] Summary of shift:   Uneventful shift. Patient up to chair twice. States she has no pain. She experiences bouts of forgetfulness (expected with age) but can appropriately answer questions. Patient has a purwick in place and is tolerating it well. Issues for physician to address:   None Number times ambulated in hallway past shift: 0 Number of times OOB to chair past shift: 2 Pain Management: 
Current medication: See [facility] Patient states pain is manageable on current pain medication: YES 
 
GI: 
 
Current diet:  DIET GI LITE (POST SURGICAL) Tolerating current diet: YES Respiratory: 
 
Incentive Spirometer at bedside: YES Patient instructed on use: YES Patient Safety: 
 
Falls Score: 3 Bed Alarm On? No 
Sitter?  No 
 
Nicanor Fothergill, RN

## 2020-02-19 NOTE — PROGRESS NOTES
SABAS Plan: 
  
            *SNF-[facility] *daughter to transport at d/c *IM letter signed & placed on chart Patient is discharging to [facility] today. FOC was provided, signed and placed on pt chart. Daughter will transport to SNF. RN to call report to [phone]. Care Management Interventions PCP Verified by CM: Yes(x 1 month) Mode of Transport at Discharge: Other (see comment)(daughter) Transition of Care Consult (CM Consult): SNF Partner SNF: Yes Discharge Durable Medical Equipment: (DME (rollator/bedside commode)) Current Support Network: Relative's Home(Lives w/daughter in ranch style home) Confirm Follow Up Transport: Family The Patient and/or Patient Representative was Provided with a Choice of Provider and Agrees with the Discharge Plan?: Yes Name of the Patient Representative Who was Provided with a Choice of Provider and Agrees with the Discharge Plan: daughter Freedom of Choice List was Provided with Basic Dialogue that Supports the Patient's Individualized Plan of Care/Goals, Treatment Preferences and Shares the Quality Data Associated with the Providers?: Yes Discharge Location Discharge Placement: Skilled nursing facility([facility]) Michael Pruitt Ext [phone]

## 2020-02-20 NOTE — PROGRESS NOTES
Spiritual Care Partner Volunteer visited patient in [place] on 2/19/20. The visit was charted on 2/20/20. Documented by: 
Rev. Fahad Velasquez EdD MDiv  For [place] Page [phone])

## 2020-03-20 NOTE — TELEPHONE ENCOUNTER
Called pt to r/s appt given concerns of COVID-19. Patient was recently seen in the hospital for elevated LFTs and mild troponin elevation. Troponins were flat. Seen by cardiology, not felt to be ACS. Echo done with preserved ejection fraction. Recommended to keep routine f/u appt as scheduled. Spoke with patient's daughter, who is on HIPAA. She repots her mother just returned home from the nursing home. She questions that metoprolol medication was not continued upon discharge from nursing home. She otherwise states her mother is doing well, but is \"slow\" and a little weak. She denies any complaints of shortness of breath or chest pain. She has been to see her PCP recently and was told liver functions were improving. Advised daughter metoprolol was likely stopped due to hypotension. Advised if she notices increase in heart rates and normal BPs, then we can consider resuming metoprolol. Advised we do not have to worry about slowing her heart rate too much as she has a pacemaker in place. Advised as patient is otherwise stable from cardiac standpoint, would recommend she post-pone appointment to avoid further exposure given COVID-19 pandemic. Daughter does verbalize understanding. Advised to call us if her mother develops any new symptoms.     Jermaine Clarke NP  2:09 PM

## 2020-08-24 NOTE — TELEPHONE ENCOUNTER
Spoke with patient's daughter to remind her of appointment tomorrow. She reports that patient is on Hospice and all in clinic appointments should be cancelled. Reports that she will talk with Hospice nurse to see if should plug in remote monitor for pacemaker.